# Patient Record
Sex: FEMALE | Race: OTHER | HISPANIC OR LATINO | Employment: PART TIME | URBAN - METROPOLITAN AREA
[De-identification: names, ages, dates, MRNs, and addresses within clinical notes are randomized per-mention and may not be internally consistent; named-entity substitution may affect disease eponyms.]

---

## 2017-02-28 ENCOUNTER — ALLSCRIPTS OFFICE VISIT (OUTPATIENT)
Dept: OTHER | Facility: OTHER | Age: 27
End: 2017-02-28

## 2017-04-07 ENCOUNTER — TRANSCRIBE ORDERS (OUTPATIENT)
Dept: ADMINISTRATIVE | Facility: HOSPITAL | Age: 27
End: 2017-04-07

## 2017-04-08 ENCOUNTER — HOSPITAL ENCOUNTER (OUTPATIENT)
Dept: RADIOLOGY | Facility: HOSPITAL | Age: 27
Discharge: HOME | End: 2017-04-08
Payer: COMMERCIAL

## 2017-04-08 ENCOUNTER — TRANSCRIBE ORDERS (OUTPATIENT)
Dept: RADIOLOGY | Facility: HOSPITAL | Age: 27
End: 2017-04-08

## 2017-04-08 DIAGNOSIS — J06.9 ACUTE UPPER RESPIRATORY INFECTION: Primary | ICD-10-CM

## 2017-04-08 DIAGNOSIS — J06.9 ACUTE UPPER RESPIRATORY INFECTION: ICD-10-CM

## 2017-04-08 PROCEDURE — 71020 HB CHEST X-RAY 2VW FRONTAL&LATL: CPT

## 2017-04-24 ENCOUNTER — TRANSCRIBE ORDERS (OUTPATIENT)
Dept: ADMINISTRATIVE | Facility: HOSPITAL | Age: 27
End: 2017-04-24

## 2017-04-24 DIAGNOSIS — E66.1 MORBID DRUG-INDUCED OBESITY: Primary | ICD-10-CM

## 2017-04-24 DIAGNOSIS — R94.5 NONSPECIFIC ABNORMAL RESULTS OF LIVER FUNCTION STUDY: ICD-10-CM

## 2017-05-05 ENCOUNTER — HOSPITAL ENCOUNTER (OUTPATIENT)
Dept: RADIOLOGY | Facility: HOSPITAL | Age: 27
Discharge: HOME | End: 2017-05-05
Payer: COMMERCIAL

## 2017-05-05 DIAGNOSIS — R94.5 NONSPECIFIC ABNORMAL RESULTS OF LIVER FUNCTION STUDY: ICD-10-CM

## 2017-05-05 DIAGNOSIS — E66.1 MORBID DRUG-INDUCED OBESITY: ICD-10-CM

## 2017-05-05 PROCEDURE — 76705 ECHO EXAM OF ABDOMEN: CPT

## 2017-05-31 ENCOUNTER — TRANSCRIBE ORDERS (OUTPATIENT)
Dept: ADMINISTRATIVE | Facility: HOSPITAL | Age: 27
End: 2017-05-31

## 2017-05-31 DIAGNOSIS — N64.4 MASTODYNIA OF RIGHT BREAST: Primary | ICD-10-CM

## 2017-06-12 ENCOUNTER — HOSPITAL ENCOUNTER (OUTPATIENT)
Dept: ULTRASOUND IMAGING | Facility: CLINIC | Age: 27
Discharge: HOME | End: 2017-06-12
Payer: COMMERCIAL

## 2017-06-12 DIAGNOSIS — N64.4 MASTODYNIA OF RIGHT BREAST: ICD-10-CM

## 2017-06-12 PROCEDURE — 76642 ULTRASOUND BREAST LIMITED: CPT

## 2017-07-10 ENCOUNTER — TRANSCRIBE ORDERS (OUTPATIENT)
Dept: ADMINISTRATIVE | Facility: HOSPITAL | Age: 27
End: 2017-07-10

## 2017-07-10 DIAGNOSIS — R10.2 FEMALE PELVIC PAIN: Primary | ICD-10-CM

## 2017-07-14 ENCOUNTER — HOSPITAL ENCOUNTER (OUTPATIENT)
Dept: RADIOLOGY | Facility: HOSPITAL | Age: 27
Discharge: HOME | End: 2017-07-14
Payer: COMMERCIAL

## 2017-07-14 DIAGNOSIS — R10.2 FEMALE PELVIC PAIN: ICD-10-CM

## 2017-07-14 PROCEDURE — 76830 TRANSVAGINAL US NON-OB: CPT

## 2017-07-14 PROCEDURE — 76856 US EXAM PELVIC COMPLETE: CPT

## 2017-07-27 ENCOUNTER — TRANSCRIBE ORDERS (OUTPATIENT)
Dept: ADMINISTRATIVE | Facility: HOSPITAL | Age: 27
End: 2017-07-27

## 2017-07-27 DIAGNOSIS — N94.10 PAIN IN FEMALE GENITALIA ON INTERCOURSE: Primary | ICD-10-CM

## 2017-07-28 ENCOUNTER — HOSPITAL ENCOUNTER (OUTPATIENT)
Dept: RADIOLOGY | Facility: HOSPITAL | Age: 27
Discharge: HOME | End: 2017-07-28
Payer: COMMERCIAL

## 2017-07-28 DIAGNOSIS — N94.10 PAIN IN FEMALE GENITALIA ON INTERCOURSE: ICD-10-CM

## 2017-09-06 ENCOUNTER — GENERIC CONVERSION - ENCOUNTER (OUTPATIENT)
Dept: OTHER | Facility: OTHER | Age: 27
End: 2017-09-06

## 2018-01-13 VITALS
BODY MASS INDEX: 35.11 KG/M2 | HEART RATE: 72 BPM | HEIGHT: 59 IN | SYSTOLIC BLOOD PRESSURE: 128 MMHG | WEIGHT: 174.16 LBS | TEMPERATURE: 98.4 F | DIASTOLIC BLOOD PRESSURE: 72 MMHG

## 2018-01-15 NOTE — PROGRESS NOTES
Assessment    1  Encounter for routine gynecological examination ( 30) (Z01 419)   2  Family history of diabetes mellitus (V18 0) (Z83 3) : Maternal Grandfather, Paternal   Grandfather, Father, Sister   3  Family history of Blood clot in vein : Sister    Plan  Encounter for routine gynecological examination    · Follow-up visit in 1 year Evaluation and Treatment  Follow-up  Status: Hold For -  Scheduling  Requested for: [id number]   Ordered; For: Encounter for routine gynecological examination; Ordered By: Ledy Rivera Performed:  Due: 42Ekl6982    Discussion/Summary  Patient discussion: discussed with the patient, 15 minute visit, greater than half of the time was spent on counseling  30 yo  well woman visit:  1  Normal exam today  2  Pap due in 2 yr  3  If scalp and/or eyebrow rash and itching recur, see PCP  4  RTO yearly and as needed  Chief Complaint  Pt here for Annual exam, gyn/ depression screening not completed prior to rooming p however, denies depression or thoughts of hurting selft  Pt is a non-smoker,c/o Occ  pain in both breasts  History of Present Illness  HPI: 30 yo  here for routine well woman visit  Last well woman visit 2015; at that time reported pelvic pain/dyspareunia, 5 yr  painful breasts, galactorrhea  All testing was normal (Prolactin, Pap, GC/CT, pelvic ultrasound, BL breast ultrasound)  Menses monthly, not excessive pain or flow  S/P BTL  Same partner for 11 yr  Uncertain history of psoriasis on scalp and eyebrows; diagnosed in [place] and given hydrocortisone; no symptoms now  GYN HM, Adult Female [facility]: The patient is being seen for a gynecology evaluation  Social History: Household members include spouse and 3 children  She is   Work status: not currently employed  The patient has never smoked cigarettes  She reports never drinking alcohol  She has never used illicit drugs  General Health:  The patient's health since the last visit is described as good  She has regular dental visits  She complains of vision problems  Vision care includes wearing glasses  She denies hearing loss  Lifestyle:  She consumes a diverse and healthy diet  She does not have any weight concerns  She does not exercise regularly  She does not use tobacco  She denies alcohol use  She denies drug use  Reproductive health:  she reports no menstrual problems  she uses contraception  For contraception, she has had a tubal occlusion  she is sexually active  pregnancy history:  Screening:      OB History  Pregnancy History (Brief):   Prior pregnancies: : 3  Para: 3 (full-term) and 3 (living)   Delivery type: 3 vaginal       Active Problems    1  Acute non-recurrent maxillary sinusitis (461 0) (J01 00)   2  Acute upper respiratory infection (465 9) (J06 9)   3  Allergic conjunctivitis (372 14) (H10 10)   4  Allergic rhinitis (477 9) (J30 9)   5  Allergic urticaria (708 0) (L50 0)   6  Chronic nasal congestion (478 19) (R09 81)   7  Chronic tension-type headache, not intractable (339 12) (G44 229)   8  Depression (311) (F32 9)   9  Encounter for routine gynecological examination (V72 31) (Z01 419)   10  Left-sided low back pain with left-sided sciatica (724 3) (M54 42)   11  Neck pain, chronic (723 1,338 29) (M54 2,G89 29)   12  Need for influenza vaccination (V04 81) (Z23)   13  Need for Tdap vaccination (V06 1) (Z23)   14  Obesity (278 00) (E66 9)   15  Poison ivy (692 6) (L23 7)   16   Screening for hyperlipidemia (V77 91) (Z13 220)    Past Medical History    · Acute conjunctivitis (372 00) (H10 30)   · History of Encounter for routine gynecological examination (V72 31) (Z01 419)   · History of abnormal cervical Papanicolaou smear (V13 29) (K81 600)   · History of galactorrhea (V12 29) (Z87 59)   · History of gastroenteritis (V12 79) (Z87 19)   · History of sinusitis (V12 69) (Z87 09)   · History of Need for influenza vaccination (V04 81) (Z23)   · History of Pelvic pain in female (625 9) (R10 2)    Surgical History    · History of Dental Surgery   · History of Tubal Ligation   · History of Umbilical Hernia Repair    The surgical history was reviewed and updated today  Family History  Mother    · Family history of systemic lupus erythematosus (V19 4) (Z82 69)  Father    · Family history of    · Family history of diabetes mellitus (V18 0) (Z83 3)  Sister    · Family history of diabetes mellitus (V18 0) (Z83 3)  Maternal Grandfather    · Family history of diabetes mellitus (V18 0) (Z83 3)  Paternal Grandfather    · Family history of diabetes mellitus (V18 0) (Z83 3)    Social History    · Denied: History of Active smoker   · Lives with domestic partner   · Never a smoker   · No drug use   · Part-time employment   · Single   · Social alcohol use (Z78 9)   · Three children    Current Meds   1  Amitriptyline HCl - 10 MG Oral Tablet; TAKE 1 TABLET AT BEDTIME; Therapy: 28AJG2582 to (Evaluate:61Ngr1670)  Requested for: 35EES9158; Last   Rx:2016; Status: ACTIVE - Renewal Denied Ordered   2  Fluticasone Propionate 50 MCG/ACT Nasal Suspension; USE 1 SPRAY IN EACH   NOSTRIL TWICE DAILY; Therapy: 38OSK8898 to (Gwendloyn Alken)  Requested for: 2016; Last   Rx:2016 Ordered   3  Ibuprofen 600 MG Oral Tablet; TAKE ONE TABLET BY MOUTH EVERY 8 HOURS AS   NEEDED FOR PAIN;   Therapy: 51Orq7608 to (Evaluate:83Hcz9468)  Requested for: 96Jlp6115; Last   Rx:06Nud8586 Ordered   4  Loratadine 10 MG Oral Tablet; TAKE 1 TABLET BY MOUTH EVERY DAY; Therapy: 58RXK0223 to (Gwendloyn Alken)  Requested for: 2016; Last   Rx:2016 Ordered   5  Mucinex D 120-1200 MG Oral Tablet Extended Release 12 Hour; TAKE 1 TABLET   EVERY 12 HOURS AS NEEDED FOR CONGESTION; Therapy: 51Ejk2464 to (Evaluate:30Gdm2592)  Requested for: 97Qvc2398; Last   Rx:73Zib6170 Ordered    Allergies    1  Cipro SOLN   2  Septra TABS   3   Zosyn    Vitals   Recorded: [id number] 10: 64SZ   Systolic 578   Diastolic 60   Pain Scale 0   LMP 66FSY4066   Height 4 ft 11 in   Weight 164 lb    BMI Calculated 33 12   BSA Calculated 1 69     Physical Exam    Constitutional   General appearance: No acute distress, well appearing and well nourished  Neck   Thyroid: Normal, no thyromegaly  Pulmonary   Auscultation of lungs: Clear to auscultation  Cardiovascular   Auscultation of heart: Normal rate and rhythm, normal S1 and S2, no murmurs  Genitourinary   External genitalia: Normal and no lesions appreciated  Vagina: Normal, no lesions or dryness appreciated  Cervix: Normal, no palpable masses  Uterus: Normal, non-tender, not enlarged, and no palpable masses  Adnexa/parametria: Normal, non-tender and no fullness or masses appreciated  Chest   Breasts: Normal and no dimpling or skin changes noted  Abdomen   Abdomen: Normal, non-tender, and no organomegaly noted  Results/Data  Screen Ebola Flowsheet 73GQH7536 10:53AM      Test Name Result Flag Reference   Exposure to Ebola Virus - Within 21 Days No         Signatures   Electronically signed by : Juvenal Geronimo;  Aug 16 2016 11:28AM EST                       (Author)    Electronically signed by : Senait Escoto MD; Sep  8 2016  9:44AM EST

## 2018-01-15 NOTE — MISCELLANEOUS
Provider Comments  Provider Comments:   pt noshow for today appt  can resched on own      Signatures   Electronically signed by : [facility]; Jan 21 2016  4:23PM EST                       (Author)    Electronically signed by : [facility]; Jan 21 2016  4:27PM EST                       (Author)

## 2018-01-22 VITALS
SYSTOLIC BLOOD PRESSURE: 129 MMHG | BODY MASS INDEX: 31.25 KG/M2 | HEIGHT: 59 IN | WEIGHT: 155 LBS | DIASTOLIC BLOOD PRESSURE: 84 MMHG

## 2018-03-28 ENCOUNTER — TRANSCRIBE ORDERS (OUTPATIENT)
Dept: ADMINISTRATIVE | Facility: HOSPITAL | Age: 28
End: 2018-03-28

## 2018-03-28 DIAGNOSIS — G47.30 SLEEP APNEA, UNSPECIFIED TYPE: ICD-10-CM

## 2018-05-04 ENCOUNTER — HOSPITAL ENCOUNTER (OUTPATIENT)
Dept: SLEEP CENTER | Facility: CLINIC | Age: 28
Discharge: HOME | End: 2018-05-04
Payer: COMMERCIAL

## 2018-05-04 DIAGNOSIS — G47.30 SLEEP APNEA, UNSPECIFIED TYPE: ICD-10-CM

## 2018-05-04 PROCEDURE — 95810 POLYSOM 6/> YRS 4/> PARAM: CPT

## 2018-05-05 NOTE — PROGRESS NOTES
Sleep Study Documentation    Pre-Sleep Study       Sleep testing procedure explained to patient:YES    Patient napped prior to study:NO    Caffeine:Dayshift worker after 12PM   Caffeine use:YES- soda  12 ounces    Alcohol:Dayshift workers after 5PM: Alcohol use:NO    Typical day for patient:YES       Study Documentation  Diagnostic   Snore: Moderate to loud  Supplemental O2: no    Minimum SaO2 91%  Baseline SaO2 97%        EKG abnormalities: yes:  EPOCH example and comments:  483, 509 &  573    EEG abnormalities: no    Study Terminated:no    Patient classification: employed       Post-Sleep Study    Medication used at bedtime or during sleep study:NO    Patient reports time it took to fall asleep:greater than 60 minutes    Patient reports waking up during study:3 or more times  Patient reports returning to sleep in 10 to 30 minutes  Patient reports sleeping 2 to 4 hours without dreaming  Patient reports sleep during study:worse than usual    Patient rated sleepiness: Somewhat sleepy or tired    PAP treatment:no

## 2018-05-08 ENCOUNTER — TELEPHONE (OUTPATIENT)
Dept: SLEEP CENTER | Facility: CLINIC | Age: 28
End: 2018-05-08

## 2018-05-22 NOTE — TELEPHONE ENCOUNTER
Letter sent to patient to follow up with PCP or call to schedule consult with Dr Carly Delgadillo if desired

## 2018-10-31 ENCOUNTER — TELEPHONE (OUTPATIENT)
Dept: GASTROENTEROLOGY | Facility: MEDICAL CENTER | Age: 28
End: 2018-10-31

## 2018-10-31 ENCOUNTER — TRANSCRIBE ORDERS (OUTPATIENT)
Dept: ADMINISTRATIVE | Facility: HOSPITAL | Age: 28
End: 2018-10-31

## 2018-10-31 DIAGNOSIS — R19.7 DIARRHEA, UNSPECIFIED TYPE: ICD-10-CM

## 2018-10-31 DIAGNOSIS — R10.9 STOMACH ACHE: Primary | ICD-10-CM

## 2018-10-31 NOTE — TELEPHONE ENCOUNTER
New pt called to sched appt due to ABD PAIN/DIARRHEA  Pt would like to sched in the [place] office and before the end of the year   Pt can be reached at [phone]

## 2018-11-05 ENCOUNTER — HOSPITAL ENCOUNTER (OUTPATIENT)
Dept: RADIOLOGY | Facility: HOSPITAL | Age: 28
Discharge: HOME | End: 2018-11-05
Payer: COMMERCIAL

## 2018-11-05 DIAGNOSIS — R19.7 DIARRHEA, UNSPECIFIED TYPE: ICD-10-CM

## 2018-11-05 DIAGNOSIS — R10.9 STOMACH ACHE: ICD-10-CM

## 2018-11-05 PROCEDURE — 76705 ECHO EXAM OF ABDOMEN: CPT

## 2018-11-15 RX ORDER — GLYCERIN ADULT
SUPPOSITORY, RECTAL RECTAL
COMMUNITY
Start: 2018-10-30 | End: 2019-02-16 | Stop reason: HOSPADM

## 2018-11-15 RX ORDER — OMEPRAZOLE 20 MG/1
1 CAPSULE, DELAYED RELEASE ORAL DAILY
COMMUNITY
Start: 2017-02-28

## 2018-11-15 RX ORDER — IBUPROFEN 600 MG/1
1 TABLET ORAL EVERY 8 HOURS PRN
Status: ON HOLD | COMMUNITY
Start: 2016-04-04 | End: 2019-02-10 | Stop reason: CLARIF

## 2018-11-15 RX ORDER — PHENTERMINE HYDROCHLORIDE 37.5 MG/1
TABLET ORAL
Status: ON HOLD | COMMUNITY
End: 2019-02-10 | Stop reason: CLARIF

## 2018-11-20 ENCOUNTER — OFFICE VISIT (OUTPATIENT)
Dept: GASTROENTEROLOGY | Facility: CLINIC | Age: 28
End: 2018-11-20
Payer: COMMERCIAL

## 2018-11-20 VITALS
HEIGHT: 59 IN | BODY MASS INDEX: 34.31 KG/M2 | TEMPERATURE: 97.9 F | SYSTOLIC BLOOD PRESSURE: 122 MMHG | WEIGHT: 170.2 LBS | DIASTOLIC BLOOD PRESSURE: 82 MMHG | HEART RATE: 72 BPM

## 2018-11-20 DIAGNOSIS — R19.7 DIARRHEA, UNSPECIFIED TYPE: ICD-10-CM

## 2018-11-20 DIAGNOSIS — K58.0 IRRITABLE BOWEL SYNDROME WITH DIARRHEA: ICD-10-CM

## 2018-11-20 DIAGNOSIS — R10.84 DIFFUSE ABDOMINAL PAIN: Primary | ICD-10-CM

## 2018-11-20 PROCEDURE — 99244 OFF/OP CNSLTJ NEW/EST MOD 40: CPT | Performed by: INTERNAL MEDICINE

## 2018-11-20 RX ORDER — DICYCLOMINE HCL 20 MG
20 TABLET ORAL EVERY 6 HOURS
Qty: 30 TABLET | Refills: 2 | Status: ON HOLD | OUTPATIENT
Start: 2018-11-20 | End: 2018-12-20

## 2018-11-20 NOTE — PROGRESS NOTES
[facility] - Outpatient Consultation  Karel Staley 33 y o  female MRN: [id number]  Encounter: [id number]          ASSESSMENT AND PLAN:    Karel Staley is a 33 y o  female who presents today, per referral by her Ramírez Soliman, in consultation regarding evaluation and management of abdominal pain and diarrhea  1  Diffuse abdominal pain  For over one month, she has been experiencing abdominal pain that is exacerbated by eating  She has a prescription for omeprazole which she reportedly will take when she feels she is going to have a stomach ache  I will schedule her for EGD to evaluate for ulcers,H pylori infection, gastritis, or esophagitis  Discussed risks, benefits, and alternatives to the procedure  Risks include but are not limited to infection, bleeding, perforation, or missed lesions  I provided her with Bentyl 20 mg to be taken as needed for abdominal pain  2  Irritable bowel syndrome with diarrhea  For the last month, she has been experiencing diarrhea which is atypical for her  This is likely due to irritable bowel syndrome  I recommend that she start taking a probiotic, Align, daily  I also recommended that she eat a high fiber diet and take fiber supplementation with Metamucil  I will schedule her for colonoscopy to evaluate for colitis, H pylori infection, or any other GI related issues  Discussed risks, benefits, and alternatives to the procedure  Risks include but are not limited to infection, bleeding, perforation, or missed lesions  I also ordered stool studies to be completed including ova and parasite, stool enteric bacterial panel, and C diff      ______________________________________________________________________    HPI:  Karel Staley is a 33 y o  female who presents today, per referral by her PCP Dr Ramírez Soliman, in consultation regarding evaluation and management of abdominal pain and change in bowel habits with diarrhea   Right sided abdominal pain has been going one for over one month and one month ago she would experience postprandial diarrhea but even when she does not eat she will still experience diarrhea  Anything that she will eat exacerbates the symptoms  She has lost approximately 10 pounds in the last 3 weeks  She was given weight loss medication but she is not taking this  Prior to this, she never experienced constipation but will have a regular bowel movement after each time she would eat  She has not had any family members who have been sick nor has she taken antibiotics  She has not recently traveled outside of the state  She denies hematochezia  Her mother and father have a history of "colitis"  She has blood work drawn within the last two weeks, results of which are not available to me  She is taking omeprazole which she will take when she feels a stomach ache  She will sometimes take Aleve once or twice a week  US abdomen was completed on 11/5/18 which showed fatty infiltration of the liver with regions of fatty sparing adjacent to the gallbladder fossa as well as elongated relatively hypoechoic lesion corticomedullary junction upper pole right kidney which was stable  REVIEW OF SYSTEMS:    CONSTITUTIONAL: Denies any fever, chills, rigors  10 pound weight loss in 3 weeks  HEENT: No earache or tinnitus  Denies hearing loss or visual disturbances  CARDIOVASCULAR: No chest pain or palpitations  RESPIRATORY: Denies any cough, hemoptysis, shortness of breath or dyspnea on exertion  GASTROINTESTINAL: As noted in the History of Present Illness  GENITOURINARY: No problems with urination  Denies any hematuria or dysuria  NEUROLOGIC: No dizziness or vertigo, denies headaches  MUSCULOSKELETAL: Denies any muscle or joint pain  SKIN: Denies skin rashes or itching  ENDOCRINE: Denies excessive thirst  Denies intolerance to heat or cold  PSYCHOSOCIAL: Denies depression or anxiety  Denies any recent memory loss  Historical Information   History reviewed  No pertinent past medical history  Past Surgical History:   Procedure Laterality Date    TUBAL LIGATION       Social History   History   Alcohol Use No     History   Drug Use No     History   Smoking Status    Never Smoker   Smokeless Tobacco    Never Used     History reviewed  No pertinent family history  Meds/Allergies       Current Outpatient Prescriptions:     [employer] PAIN RELIEF EXTRA STRENGTH 500 MG tablet    ibuprofen (MOTRIN) 600 mg tablet    omeprazole (PriLOSEC) 20 mg delayed release capsule    phentermine (ADIPEX-P) 37 5 MG tablet    Allergies   Allergen Reactions    Septra [Sulfamethoxazole-Trimethoprim] Rash and Shortness Of Breath    Ciprofloxacin Hcl     Zosyn [Piperacillin Sod-Tazobactam So] Hives and Rash           Objective     Blood pressure 122/82, pulse 72, temperature 97 9 °F (36 6 °C), temperature source Tympanic, height 4' 11" (1 499 m), weight 77 2 kg (170 lb 3 2 oz)  Body mass index is 34 38 kg/m²  PHYSICAL EXAM:      General Appearance:   Alert, cooperative, no distress  Obese  HEENT:   Normocephalic, atraumatic, anicteric      Neck:  Supple, symmetrical, trachea midline   Lungs:   Clear to auscultation bilaterally; no rales, rhonchi or wheezing; respirations unlabored    Heart[de-identified]   Regular rate and rhythm; no murmur, rub, or gallop  Abdomen:   Soft, non-distended; normal bowel sounds; no masses, no organomegaly  Mild diffuse tenderness   Genitalia:   Deferred    Rectal:   Deferred    Extremities:  No cyanosis, clubbing or edema    Pulses:  2+ and symmetric    Skin:  No jaundice, rashes, or lesions    Lymph nodes:  No palpable cervical lymphadenopathy        Lab Results:   No visits with results within 1 Day(s) from this visit     Latest known visit with results is:   Admission on 01/21/2016, Discharged on 01/21/2016   Component Date Value    Preg Test, Ur (Ref: Nega* 01/21/2016 Negative     Color, UA 01/21/2016 Yellow  Clarity, UA 01/21/2016 Clear     pH, UA 01/21/2016 6 0     Leukocytes, UA 01/21/2016 Negative     Nitrite, UA 01/21/2016 Negative     Protein, UA 01/21/2016 Negative     Glucose, UA 01/21/2016 Negative     Ketones, UA 01/21/2016 Negative     Urobilinogen, UA 01/21/2016 0 2     Bilirubin, UA 01/21/2016 Negative     Blood, UA 01/21/2016 Small*    Specific Gravity, UA 01/21/2016 <=1 005     RBC, UA 01/21/2016 None Seen     WBC, UA 01/21/2016 None Seen     Epithelial Cells 01/21/2016 None Seen     Bacteria, UA 01/21/2016 None Seen     Urine Culture 01/21/2016 No Growth <1000 cfu/mL     Ventricular Rate 01/21/2016 140     Atrial Rate 01/21/2016 140     NC Interval 01/21/2016 184     QRSD Interval 01/21/2016 90     QT Interval 01/21/2016 270     QTC Interval 01/21/2016 412     P Axis 01/21/2016 59     QRS Axis 01/21/2016 60     T Wave Axis 01/21/2016 -4     Total Bilirubin 01/21/2016 0 52     Calcium 01/21/2016 9 1     CO2 01/21/2016 21     Chloride 01/21/2016 104     Creatinine 01/21/2016 0 91     Glucose 01/21/2016 127     Alkaline Phosphatase 01/21/2016 67     Potassium 01/21/2016 4 3     Total Protein 01/21/2016 8 0     Sodium 01/21/2016 136     AST 01/21/2016 35     ALT 01/21/2016 32     BUN 01/21/2016 7     ANION GAP 01/21/2016 11     Albumin 01/21/2016 3 6     eGFR 01/21/2016 >60 0     WBC 01/21/2016 13 47*    RBC 01/21/2016 4 39     Hemoglobin 01/21/2016 14 3     Hematocrit 01/21/2016 41 8     MCV 01/21/2016 95 2     MCH 01/21/2016 32 6     MCHC 01/21/2016 34 2     RDW 01/21/2016 12 1     MPV 01/21/2016 10 7     Platelets 86/55/3112 190     nRBC 01/21/2016 0     Neutrophils Relative 01/21/2016 87*    Lymphocytes Relative 01/21/2016 7*    Monocytes Relative 01/21/2016 6     Eosinophils Relative 01/21/2016 0     Basophils Relative 01/21/2016 0     Neutrophils Absolute 01/21/2016 11 60*    Lymphocytes Absolute 01/21/2016 0 96     Monocytes Absolute 01/21/2016 0 86     Eosinophils Absolute 01/21/2016 0 00     Basophils Absolute 01/21/2016 0 01          Radiology Results:   US Abdomen Limited on 11/5/2018   Impression: Fatty infiltration of liver with regions of fatty sparing adjacent to gallbladder fossa  Unremarkable gallbladder and biliary ducts  Elongated relatively hypoechoic lesion corticomedullary junction upper pole right kidney grossly stable from May 2017  Given the stability, this is more than likely benign however given the unusual appearance, and patient's symptoms: Follow-up contrast-enhanced CT of the abdomen and pelvis (renal mass protocol) would be prudent to evaluate the patient's symptoms and this ultrasound finding  Workstation performed: [id number]           Attestation:   By signing my name below, Maxim Melgoza, attest that this documentation has been prepared under the direction and in the presence of Orville Whitehead MD  Electronically Signed: Carlito Pagan  11/20/18     I, Orville Whitehead, personally performed the services described in this documentation  All medical record entries made by the scribe were at my direction and in my presence  I have reviewed the chart and discharge instructions and agree that the record reflects my personal performance and is accurate and complete   Orville Whitehead MD  11/20/18

## 2018-11-20 NOTE — PATIENT INSTRUCTIONS
1  Take Align 1 capsule per day  2  Eat high fiber diet, Metamucil is good to take as fiber supplementation  3  Take bentyl as needed for abdominal pain  4  We will schedule you for EGD and colonoscopy  Colon/EGD scheduled 12/20/18 with Guillermina at [facility]  Suprep instructions and coupon given in office   Release of [facility] sent to PCP Dr Ivan Olivares at [phone]

## 2018-12-09 ENCOUNTER — ANESTHESIA EVENT (OUTPATIENT)
Dept: PERIOP | Facility: AMBULARY SURGERY CENTER | Age: 28
End: 2018-12-09
Payer: COMMERCIAL

## 2018-12-10 DIAGNOSIS — R19.7 DIARRHEA, UNSPECIFIED TYPE: Primary | ICD-10-CM

## 2018-12-14 ENCOUNTER — TELEPHONE (OUTPATIENT)
Dept: GASTROENTEROLOGY | Facility: CLINIC | Age: 28
End: 2018-12-14

## 2018-12-14 NOTE — TELEPHONE ENCOUNTER
geme patient      [facility] to notify you that the su[facility] is not covered by insurance--please order alternate prep for appt on 12-20-18

## 2018-12-14 NOTE — TELEPHONE ENCOUNTER
I called and spoke to pharmacist at [facility] he will fill golytely for pt and I faxed prep instructions to [phone]

## 2018-12-20 ENCOUNTER — HOSPITAL ENCOUNTER (OUTPATIENT)
Facility: AMBULARY SURGERY CENTER | Age: 28
Setting detail: OUTPATIENT SURGERY
Discharge: HOME | End: 2018-12-20
Attending: INTERNAL MEDICINE | Admitting: INTERNAL MEDICINE
Payer: COMMERCIAL

## 2018-12-20 ENCOUNTER — ANESTHESIA (OUTPATIENT)
Dept: PERIOP | Facility: AMBULARY SURGERY CENTER | Age: 28
End: 2018-12-20
Payer: COMMERCIAL

## 2018-12-20 VITALS
HEIGHT: 59 IN | HEART RATE: 78 BPM | OXYGEN SATURATION: 98 % | BODY MASS INDEX: 33.47 KG/M2 | DIASTOLIC BLOOD PRESSURE: 74 MMHG | WEIGHT: 166 LBS | SYSTOLIC BLOOD PRESSURE: 122 MMHG | TEMPERATURE: 97.2 F | RESPIRATION RATE: 18 BRPM

## 2018-12-20 DIAGNOSIS — K58.0 IRRITABLE BOWEL SYNDROME WITH DIARRHEA: ICD-10-CM

## 2018-12-20 DIAGNOSIS — R10.84 DIFFUSE ABDOMINAL PAIN: ICD-10-CM

## 2018-12-20 DIAGNOSIS — R19.7 DIARRHEA, UNSPECIFIED TYPE: ICD-10-CM

## 2018-12-20 PROCEDURE — 45380 COLONOSCOPY AND BIOPSY: CPT | Performed by: INTERNAL MEDICINE

## 2018-12-20 PROCEDURE — 43239 EGD BIOPSY SINGLE/MULTIPLE: CPT | Performed by: INTERNAL MEDICINE

## 2018-12-20 PROCEDURE — 88305 TISSUE EXAM BY PATHOLOGIST: CPT | Performed by: PATHOLOGY

## 2018-12-20 RX ORDER — PROPOFOL 10 MG/ML
INJECTION, EMULSION INTRAVENOUS AS NEEDED
Status: DISCONTINUED | OUTPATIENT
Start: 2018-12-20 | End: 2018-12-20 | Stop reason: SURG

## 2018-12-20 RX ORDER — SODIUM CHLORIDE 9 MG/ML
125 INJECTION, SOLUTION INTRAVENOUS CONTINUOUS
Status: DISCONTINUED | OUTPATIENT
Start: 2018-12-20 | End: 2018-12-20 | Stop reason: HOSPADM

## 2018-12-20 RX ADMIN — PROPOFOL 50 MG: 10 INJECTION, EMULSION INTRAVENOUS at 09:10

## 2018-12-20 RX ADMIN — SODIUM CHLORIDE: 0.9 INJECTION, SOLUTION INTRAVENOUS at 08:57

## 2018-12-20 RX ADMIN — PROPOFOL 100 MG: 10 INJECTION, EMULSION INTRAVENOUS at 09:02

## 2018-12-20 RX ADMIN — PROPOFOL 50 MG: 10 INJECTION, EMULSION INTRAVENOUS at 09:13

## 2018-12-20 RX ADMIN — PROPOFOL 100 MG: 10 INJECTION, EMULSION INTRAVENOUS at 09:00

## 2018-12-20 RX ADMIN — PROPOFOL 100 MG: 10 INJECTION, EMULSION INTRAVENOUS at 09:04

## 2018-12-20 NOTE — DISCHARGE INSTRUCTIONS
OPERATIVE REPORT  PATIENT NAME: Lindsey Ruth    :  1990  MRN: [id number]  Pt Location: [facility]  GI ROOM 01    SURGERY DATE: 2018    Surgeon(s) and Role:     * Sveta Finnegan MD - Primary    Preop Diagnosis:  Diarrhea, unspecified type [R19 7]  Diffuse abdominal pain [R10 84]  Irritable bowel syndrome with diarrhea [K58 0]    Post-Op Diagnosis Codes:     * Diarrhea, unspecified type [R19 7]     * Diffuse abdominal pain [R10 84]     * Irritable bowel syndrome with diarrhea [K58 0]    Procedure(s) (LRB):  COLONOSCOPY (N/A)  ESOPHAGOGASTRODUODENOSCOPY (EGD) (N/A)    Specimen(s):  ID Type Source Tests Collected by Time Destination   1 : Cold Bx Duodenum Tissue Duodenum TISSUE EXAM Sveta Finnegan MD 2018 0905    2 : Cold Bx Gastric Body Tissue Stomach TISSUE EXAM Sveta Finnegan MD 2018 1516        Estimated Blood Loss:   Minimal    ESOPHAGOGASTRODUODENOSCOPY    PROCEDURE: EGD    SEDATION: Monitored anesthesia care, check anesthesia records    ASA Class: 2    INDICATIONS:  Abdominal pain and change in bowel habit    CONSENT:  Informed consent was obtained for the procedure, including sedation after explaining the risks and benefits of the procedure  Risks including but not limited to bleeding, perforation, infection, and missed lesion  PREPARATION:   Telemetry, pulse oximetry, blood pressure were monitored throughout the procedure  Patient was identified by myself both verbally and by visual inspection of ID band  DESCRIPTION:   Patient was placed in the left lateral decubitus position and was sedated with the above medication  The gastroscope was introduced in to the oropharynx and the esophagus was intubated under direct visualization  Scope was passed down the esophagus up to 2nd part of the duodenum  A careful inspection was made as the gastroscope was withdrawn, including a retroflexed view of the stomach; findings and interventions are described below       FINDINGS:    #1  Esophagus- normal esophagus  GE junction at 35 cm  #2  Stomach- nonerosive gastritis in the antrum  Two cold biopsies were taken from the antrum to rule out H pylori infection  #3  Duodenum- normal bulb and 2nd portion of the duodenum  Four cold biopsies were taken to assess for celiac disease  IMPRESSIONS:      Mild nonerosive gastritis otherwise normal EGD  Biopsies were taken to rule out H pylori infection and celiac disease  RECOMMENDATIONS:     Follow-up biopsy  Colonoscopy to follow    COMPLICATIONS:  None; patient tolerated the procedure well            DISPOSITION: PACU           CONDITION: Stable    SIGNATURE: Beronica Lombardi MD  DATE: 2018  TIME: 9:06 AM      OPERATIVE REPORT  PATIENT NAME: Shay Mujica    :  1990  MRN: [id number]  Pt Location: [facility]  GI ROOM 01    SURGERY DATE: 2018    Surgeon(s) and Role:     * Beronica Lombardi MD - Primary    Preop Diagnosis:  Diarrhea, unspecified type [R19 7]  Diffuse abdominal pain [R10 84]  Irritable bowel syndrome with diarrhea [K58 0]    Post-Op Diagnosis Codes:     * Diarrhea, unspecified type [R19 7]     * Diffuse abdominal pain [R10 84]     * Irritable bowel syndrome with diarrhea [K58 0]    Procedure(s) (LRB):  COLONOSCOPY (N/A)  ESOPHAGOGASTRODUODENOSCOPY (EGD) (N/A)    Specimen(s):  ID Type Source Tests Collected by Time Destination   1 : Cold Bx Duodenum Tissue Duodenum TISSUE EXAM Beronica Lombardi MD 2018 09    2 : Cold Bx Gastric Body Tissue Stomach TISSUE EXAM Beronica Lombardi MD 2018    3 : Random Coldm Bx Colon Tissue Colon TISSUE EXAM Beronica Lombardi MD 2018 0912        Estimated Blood Loss:   Minimal    Colonoscopy Procedure Note    Procedure: Colonoscopy    Sedation: Monitored anesthesia care, check anesthesia records      ASA Class: 2    INDICATIONS:  Abdominal pain and diarrhea    POST-OP DIAGNOSIS: See the impression below    Procedure Details     Prior colonoscopy: No prior colonoscopy  Informed consent was obtained for the procedure, including sedation  Risks of perforation, hemorrhage, adverse drug reaction and aspiration were discussed  The patient was placed in the left lateral decubitus position  Based on the pre-procedure assessment, including review of the patient's medical history, medications, allergies, and review of systems, she had been deemed to be an appropriate candidate for conscious sedation; she was therefore sedated with the medications listed below  The patient was monitored continuously with telemetry, pulse oximetry, blood pressure monitoring, and direct observations  A rectal examination was performed  The colonoscope was inserted into the rectum and advanced under direct vision to the cecum, which was identified by the ileocecal valve and appendiceal orifice  The quality of the colonic preparation was good  A careful inspection was made as the colonoscope was withdrawn, including a retroflexed view of the rectum; findings and interventions are described below  Findings:  Normal colon and terminal ileum  Random biopsies were taken to rule out celiac disease  Complications: None; patient tolerated the procedure well  Impression:    Normal colon and terminal ileum  Random biopsies were taken to rule out celiac disease  Recommendations:  Colonoscopy is recommended at age 48 or sooner if clinically indicated  Follow-up biopsy result  High-fiber diet  If you have abdominal pain, bleeding or fever call my office at [phone]      SIGNATURE: Maxx Villarreal MD  DATE: December 20, 2018  TIME: 9:25 AM

## 2018-12-20 NOTE — H&P
History and Physical - [facility]  Yasmeen Sams 33 y o  female MRN: [id number]    HPI: Yasmeen Sams is a 33y o  year old female who presents for evaluation of diffuse abdominal pain and diarrhea  Review of Systems    Historical Information   Past Medical History:   Diagnosis Date    GERD (gastroesophageal reflux disease)     Obesity      Past Surgical History:   Procedure Laterality Date    HERNIA REPAIR      TUBAL LIGATION       Social History   History   Alcohol Use No     History   Drug Use No     History   Smoking Status    Never Smoker   Smokeless Tobacco    Never Used     History reviewed  No pertinent family history  Meds/Allergies     Prescriptions Prior to Admission   Medication    [employer] PAIN RELIEF EXTRA STRENGTH 500 MG tablet    ibuprofen (MOTRIN) 600 mg tablet    Na Sulfate-K Sulfate-Mg Sulf 17 5-3 13-1 6 GM/177ML SOLN    omeprazole (PriLOSEC) 20 mg delayed release capsule    phentermine (ADIPEX-P) 37 5 MG tablet       Allergies   Allergen Reactions    Septra [Sulfamethoxazole-Trimethoprim] Rash and Shortness Of Breath    Ciprofloxacin Hcl     Zosyn [Piperacillin Sod-Tazobactam So] Hives and Rash       Objective     Blood pressure 121/70, pulse 98, temperature (!) 97 1 °F (36 2 °C), temperature source Temporal, resp  rate 18, height 4' 11" (1 499 m), weight 75 3 kg (166 lb), last menstrual period 12/17/2018, SpO2 100 %  PHYSICAL EXAM    Gen: NAD  CV: RRR  CHEST: Clear  ABD: soft, NT/ND  EXT: no edema  Neuro: AAO      ASSESSMENT/PLAN:  This is a 33y o  year old female here for EGD and colonoscopy for evaluation of abdominal pain and diarrhea      PLAN:   Procedure:  EGD and colonoscopy with biopsy

## 2018-12-20 NOTE — ANESTHESIA PREPROCEDURE EVALUATION
Review of Systems/Medical History  Patient summary reviewed  Chart reviewed  No history of anesthetic complications     Cardiovascular  Exercise tolerance (METS): >4,  No hypertension ,    Pulmonary  Not a smoker , No asthma , No recent URI , Sleep apnea ,        GI/Hepatic    GERD ,             Endo/Other     GYN       Hematology   Musculoskeletal       Neurology   Psychology           Physical Exam    Airway    Mallampati score: III  TM Distance: >3 FB       Dental   No notable dental hx     Cardiovascular      Pulmonary      Other Findings        Anesthesia Plan  ASA Score- 2     Anesthesia Type- IV sedation with anesthesia with ASA Monitors  Additional Monitors:   Airway Plan:     Comment: GITA Jimenez , have personally seen and evaluated the patient prior to anesthetic care  I have reviewed the pre-anesthetic record, and other medical records if appropriate to the anesthetic care  If a CRNA is involved in the case, I have reviewed the CRNA assessment, if present, and agree  Risks/benefits and alternatives discussed with patient including possible PONV, sore throat, and possibility of rare anesthetic and surgical emergencies        Plan Factors- Patient instructed to abstain from smoking on day of procedure  Patient did not smoke on day of surgery  Induction-     Postoperative Plan-     Informed Consent- Anesthetic plan and risks discussed with patient  I personally reviewed this patient with the CRNA  Discussed and agreed on the Anesthesia Plan with the CRNA  aDni Killian

## 2018-12-20 NOTE — OP NOTE
OPERATIVE REPORT  PATIENT NAME: Maira Chau    :  1990  MRN: [id number]  Pt Location: [facility]  GI ROOM 01    SURGERY DATE: 2018    Surgeon(s) and Role:     * Lety Fay MD - Primary    Preop Diagnosis:  Diarrhea, unspecified type [R19 7]  Diffuse abdominal pain [R10 84]  Irritable bowel syndrome with diarrhea [K58 0]    Post-Op Diagnosis Codes:     * Diarrhea, unspecified type [R19 7]     * Diffuse abdominal pain [R10 84]     * Irritable bowel syndrome with diarrhea [K58 0]    Procedure(s) (LRB):  COLONOSCOPY (N/A)  ESOPHAGOGASTRODUODENOSCOPY (EGD) (N/A)    Specimen(s):  ID Type Source Tests Collected by Time Destination   1 : Cold Bx Duodenum Tissue Duodenum TISSUE EXAM Lety Fay MD 2018 0905    2 : Cold Bx Gastric Body Tissue Stomach TISSUE EXAM Lety Fay MD 2018 1575    3 : Random Coldm Bx Colon Tissue Colon TISSUE EXAM Lety Fay MD 2018 0912        Estimated Blood Loss:   Minimal    Colonoscopy Procedure Note    Procedure: Colonoscopy    Sedation: Monitored anesthesia care, check anesthesia records      ASA Class: 2    INDICATIONS:  Abdominal pain and diarrhea    POST-OP DIAGNOSIS: See the impression below    Procedure Details     Prior colonoscopy: No prior colonoscopy  Informed consent was obtained for the procedure, including sedation  Risks of perforation, hemorrhage, adverse drug reaction and aspiration were discussed  The patient was placed in the left lateral decubitus position  Based on the pre-procedure assessment, including review of the patient's medical history, medications, allergies, and review of systems, she had been deemed to be an appropriate candidate for conscious sedation; she was therefore sedated with the medications listed below  The patient was monitored continuously with telemetry, pulse oximetry, blood pressure monitoring, and direct observations  A rectal examination was performed    The colonoscope was inserted into the rectum and advanced under direct vision to the cecum, which was identified by the ileocecal valve and appendiceal orifice  The quality of the colonic preparation was good  A careful inspection was made as the colonoscope was withdrawn, including a retroflexed view of the rectum; findings and interventions are described below  Findings:  Normal colon and terminal ileum  Random biopsies were taken to rule out celiac disease  Complications: None; patient tolerated the procedure well  Impression:    Normal colon and terminal ileum  Random biopsies were taken to rule out celiac disease  Recommendations:  Colonoscopy is recommended at age 48 or sooner if clinically indicated  Follow-up biopsy result  High-fiber diet  If you have abdominal pain, bleeding or fever call my office at [phone]      SIGNATURE: Madelin Reese MD  DATE: December 20, 2018  TIME: 9:25 AM

## 2018-12-20 NOTE — ANESTHESIA POSTPROCEDURE EVALUATION
Post-Op Assessment Note      CV Status:  Stable    Mental Status:  Alert and awake    Hydration Status:  Stable and euvolemic    PONV Controlled:  Controlled    Airway Patency:  Patent and adequate    Post Op Vitals Reviewed: Yes          Staff: CRNA           /58 (12/20/18 0916)    Temp     Pulse 84 (12/20/18 0916)   Resp 20 (12/20/18 0916)    SpO2 99 % (12/20/18 0916)

## 2018-12-20 NOTE — OP NOTE
OPERATIVE REPORT  PATIENT NAME: Song Leal    :  1990  MRN: [id number]  Pt Location: [facility]  GI ROOM 01    SURGERY DATE: 2018    Surgeon(s) and Role:     * Min Azevedo MD - Primary    Preop Diagnosis:  Diarrhea, unspecified type [R19 7]  Diffuse abdominal pain [R10 84]  Irritable bowel syndrome with diarrhea [K58 0]    Post-Op Diagnosis Codes:     * Diarrhea, unspecified type [R19 7]     * Diffuse abdominal pain [R10 84]     * Irritable bowel syndrome with diarrhea [K58 0]    Procedure(s) (LRB):  COLONOSCOPY (N/A)  ESOPHAGOGASTRODUODENOSCOPY (EGD) (N/A)    Specimen(s):  ID Type Source Tests Collected by Time Destination   1 : Cold Bx Duodenum Tissue Duodenum TISSUE EXAM Min Azevedo MD 2018 0905    2 : Cold Bx Gastric Body Tissue Stomach TISSUE EXAM Min Azevedo MD 2018 6865        Estimated Blood Loss:   Minimal    ESOPHAGOGASTRODUODENOSCOPY    PROCEDURE: EGD    SEDATION: Monitored anesthesia care, check anesthesia records    ASA Class: 2    INDICATIONS:  Abdominal pain and change in bowel habit    CONSENT:  Informed consent was obtained for the procedure, including sedation after explaining the risks and benefits of the procedure  Risks including but not limited to bleeding, perforation, infection, and missed lesion  PREPARATION:   Telemetry, pulse oximetry, blood pressure were monitored throughout the procedure  Patient was identified by myself both verbally and by visual inspection of ID band  DESCRIPTION:   Patient was placed in the left lateral decubitus position and was sedated with the above medication  The gastroscope was introduced in to the oropharynx and the esophagus was intubated under direct visualization  Scope was passed down the esophagus up to 2nd part of the duodenum  A careful inspection was made as the gastroscope was withdrawn, including a retroflexed view of the stomach; findings and interventions are described below       FINDINGS:    #1  Esophagus- normal esophagus  GE junction at 35 cm  #2  Stomach- nonerosive gastritis in the antrum  Two cold biopsies were taken from the antrum to rule out H pylori infection  #3  Duodenum- normal bulb and 2nd portion of the duodenum  Four cold biopsies were taken to assess for celiac disease  IMPRESSIONS:      Mild nonerosive gastritis otherwise normal EGD  Biopsies were taken to rule out H pylori infection and celiac disease  RECOMMENDATIONS:     Follow-up biopsy  Colonoscopy to follow    COMPLICATIONS:  None; patient tolerated the procedure well            DISPOSITION: PACU           CONDITION: Stable    SIGNATURE: Mary Kate Sandoval MD  DATE: December 20, 2018  TIME: 9:06 AM

## 2019-01-08 ENCOUNTER — TRANSCRIBE ORDERS (OUTPATIENT)
Dept: ADMINISTRATIVE | Facility: HOSPITAL | Age: 29
End: 2019-01-08

## 2019-01-08 DIAGNOSIS — N28.9 KIDNEY LESION: ICD-10-CM

## 2019-01-08 DIAGNOSIS — R93.89 ABNORMAL FINDING ON ULTRASOUND: Primary | ICD-10-CM

## 2019-01-16 ENCOUNTER — HOSPITAL ENCOUNTER (OUTPATIENT)
Dept: RADIOLOGY | Facility: HOSPITAL | Age: 29
Discharge: HOME | End: 2019-01-16
Payer: COMMERCIAL

## 2019-01-16 DIAGNOSIS — N28.9 KIDNEY LESION: ICD-10-CM

## 2019-01-16 DIAGNOSIS — R93.89 ABNORMAL FINDING ON ULTRASOUND: ICD-10-CM

## 2019-01-16 PROCEDURE — 74178 CT ABD&PLV WO CNTR FLWD CNTR: CPT

## 2019-01-16 RX ADMIN — IOHEXOL 100 ML: 350 INJECTION, SOLUTION INTRAVENOUS at 14:06

## 2019-02-01 ENCOUNTER — APPOINTMENT (EMERGENCY)
Dept: RADIOLOGY | Facility: HOSPITAL | Age: 29
End: 2019-02-01
Payer: COMMERCIAL

## 2019-02-01 ENCOUNTER — HOSPITAL ENCOUNTER (EMERGENCY)
Facility: HOSPITAL | Age: 29
Discharge: HOME | End: 2019-02-02
Attending: EMERGENCY MEDICINE | Admitting: EMERGENCY MEDICINE
Payer: COMMERCIAL

## 2019-02-01 VITALS
HEIGHT: 55 IN | WEIGHT: 165 LBS | OXYGEN SATURATION: 98 % | DIASTOLIC BLOOD PRESSURE: 93 MMHG | TEMPERATURE: 97.3 F | HEART RATE: 99 BPM | SYSTOLIC BLOOD PRESSURE: 139 MMHG | RESPIRATION RATE: 17 BRPM | BODY MASS INDEX: 38.18 KG/M2
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
## 2019-02-01 DIAGNOSIS — S70.01XA CONTUSION OF RIGHT HIP, INITIAL ENCOUNTER: ICD-10-CM

## 2019-02-01 LAB
BILIRUB UR QL STRIP: NEGATIVE
CLARITY UR: CLEAR
COLOR UR: YELLOW
COLOR, POC: YELLOW
GLUCOSE UR STRIP-MCNC: NEGATIVE MG/DL
HGB UR QL STRIP.AUTO: NEGATIVE
KETONES UR STRIP-MCNC: NEGATIVE MG/DL
LEUKOCYTE ESTERASE UR QL STRIP: NEGATIVE
NITRITE UR QL STRIP: NEGATIVE
PH UR STRIP.AUTO: 8 [PH] (ref 4.5–8)
PROT UR STRIP-MCNC: ABNORMAL MG/DL
SP GR UR STRIP.AUTO: 1.01 (ref 1–1.03)
UROBILINOGEN UR QL STRIP.AUTO: 0.2 E.U./DL

## 2019-02-01 PROCEDURE — 99283 EMERGENCY DEPT VISIT LOW MDM: CPT

## 2019-02-01 PROCEDURE — 81025 URINE PREGNANCY TEST: CPT | Performed by: EMERGENCY MEDICINE

## 2019-02-01 PROCEDURE — 72100 X-RAY EXAM L-S SPINE 2/3 VWS: CPT

## 2019-02-01 PROCEDURE — 73502 X-RAY EXAM HIP UNI 2-3 VIEWS: CPT

## 2019-02-02 LAB
BACTERIA UR QL AUTO: ABNORMAL /HPF
EXT PREG TEST URINE: NORMAL
HYALINE CASTS #/AREA URNS LPF: ABNORMAL /LPF
NON-SQ EPI CELLS URNS QL MICRO: ABNORMAL /HPF
RBC #/AREA URNS AUTO: ABNORMAL /HPF
WBC #/AREA URNS AUTO: ABNORMAL /HPF

## 2019-02-02 PROCEDURE — 81001 URINALYSIS AUTO W/SCOPE: CPT

## 2019-02-02 NOTE — ED PROVIDER NOTES
History  Chief Complaint   Patient presents with   [employer] Fall     pt reports mechanical slip on a rug  denies head strike  denies LOC  Reports her back "hurts" and paion radiates to right leg     32 yo F presents to ED for low back pain and R hip pain after fall  Pt says she was outside the front of her home and stepped onto the welcome mat, which slid out from under her, and she fell onto her buttocks  Her  helped her up, and she has been ambulatory since but with pain, especially of the R hip  No numbness/tingling or weakness  No saddle anesthesia, urinary or bowel incontinence or retention  No fever/chills  Prior to Admission Medications   Prescriptions Last Dose Informant Patient Reported? Taking? [employer]S PAIN RELIEF EXTRA STRENGTH 500 MG tablet  Self Yes No   Na Sulfate-K Sulfate-Mg Sulf 17 5-3 13-1 6 GM/177ML SOLN   No No   Sig: Use as directed by office   ibuprofen (MOTRIN) 600 mg tablet  Self Yes No   Sig: Take 1 tablet by mouth every 8 (eight) hours as needed   omeprazole (PriLOSEC) 20 mg delayed release capsule  Self Yes No   Sig: Take 1 tablet by mouth daily   phentermine (ADIPEX-P) 37 5 MG tablet  Self Yes No   Sig: Take by mouth      Facility-Administered Medications: None       Past Medical History:   Diagnosis Date    GERD (gastroesophageal reflux disease)     Obesity        Past Surgical History:   Procedure Laterality Date    HERNIA REPAIR      CA COLONOSCOPY FLX DX W/COLLJ SPEC WHEN PFRMD N/A 12/20/2018    Procedure: COLONOSCOPY;  Surgeon: Seven Hooker MD;  [facility]: [facility]; Service: Gastroenterology    CA ESOPHAGOGASTRODUODENOSCOPY TRANSORAL DIAGNOSTIC N/A 12/20/2018    Procedure: ESOPHAGOGASTRODUODENOSCOPY (EGD); Surgeon: Seven Hooker MD;  [facility]: [facility]; Service: Gastroenterology    TUBAL LIGATION         History reviewed  No pertinent family history  I have reviewed and agree with the history as documented      Social History   Substance Use Topics    Smoking status: Never Smoker    Smokeless tobacco: Never Used    Alcohol use No        Review of Systems   Constitutional: Negative for activity change, chills and fever  HENT: Negative for congestion, rhinorrhea, sore throat and trouble swallowing  Eyes: Negative for pain, discharge and visual disturbance  Respiratory: Negative for cough, chest tightness and shortness of breath  Cardiovascular: Negative for chest pain, palpitations and leg swelling  Gastrointestinal: Negative for abdominal pain, nausea and vomiting  Genitourinary: Negative for dysuria, frequency and urgency  Musculoskeletal: Positive for back pain  Negative for neck pain and neck stiffness  Skin: Negative for color change, rash and wound  Neurological: Negative for dizziness, syncope, light-headedness and headaches  Physical Exam  ED Triage Vitals [02/01/19 2244]   Temperature Pulse Respirations Blood Pressure SpO2   (!) 97 3 °F (36 3 °C) 99 17 139/93 98 %      Temp Source Heart Rate Source Patient Position - Orthostatic VS BP Location FiO2 (%)   Oral -- -- Left arm --      Pain Score       Worst Possible Pain           Orthostatic Vital Signs  Vitals:    02/01/19 2244   BP: 139/93   Pulse: 99       Physical Exam   Constitutional: She is oriented to person, place, and time  She appears well-developed and well-nourished  No distress  HENT:   Head: Normocephalic and atraumatic  Mouth/Throat: Oropharynx is clear and moist    Eyes: Conjunctivae and EOM are normal  Right eye exhibits no discharge  Left eye exhibits no discharge  Neck: Normal range of motion  Neck supple  Cardiovascular: Normal rate, regular rhythm and intact distal pulses  Pulmonary/Chest: Effort normal and breath sounds normal  No stridor  No respiratory distress  She exhibits no tenderness  Abdominal: Soft  There is no tenderness  There is no rebound and no guarding  Musculoskeletal: Normal range of motion   She exhibits tenderness (L spine tenderness, diffuse paraspinous lumbar tenderness as well  R hip tenderness to palpation)  She exhibits no edema  Neurological: She is alert and oriented to person, place, and time  No sensory deficit  She exhibits normal muscle tone  Coordination normal    Skin: Skin is warm and dry  Capillary refill takes less than 2 seconds  Nursing note and vitals reviewed  ED Medications  Medications - No data to display    Diagnostic Studies  Results Reviewed     Procedure Component Value Units Date/Time    Urine Microscopic [[id number]]  (Abnormal) Collected:  02/02/19 0013    Lab Status:  Final result Specimen:  Urine from Urine, Clean Catch Updated:  02/02/19 0027     RBC, UA 4-10 (A) /hpf      WBC, UA None Seen /hpf      Epithelial Cells Occasional /hpf      Bacteria, UA Occasional /hpf      Hyaline Casts, UA None Seen /lpf     POCT pregnancy, urine [[id number]]  (Normal) Resulted:  02/02/19 0013    Lab Status:  Final result Updated:  02/02/19 0014     EXT PREG TEST UR (Ref: Negative) neg    POCT urinalysis dipstick [[id number]]  (Normal) Resulted:  02/01/19 2338    Lab Status:  Final result Updated:  02/01/19 2338     Color, UA yellow    ED Urine Macroscopic [[id number]]  (Abnormal) Collected:  02/01/19 2339    Lab Status:  Final result Specimen:  Urine Updated:  02/01/19 2337     Color, UA Yellow     Clarity, UA Clear     pH, UA 8 0     Leukocytes, UA Negative     Nitrite, UA Negative     Protein, UA Trace (A) mg/dl      Glucose, UA Negative mg/dl      Ketones, UA Negative mg/dl      Urobilinogen, UA 0 2 E U /dl      Bilirubin, UA Negative     Blood, UA Negative     Specific Gravity, UA 1 015    Narrative:       CLINITEK RESULT                 XR spine lumbar 2 or 3 views injury   ED Interpretation by Ashlyn Shahid MD (02/02 0103)   No acute osseous abnormality as interpreted by myself  Final Result by Anderson Caretr DO (02/02 3744)   No acute fracture or traumatic malalignment        Workstation performed: [id number]         XR hip/pelv 2-3 vws right if performed   ED Interpretation by Zak Bautista MD (02/02 0103)   No acute osseous abnormality as interpreted by myself  Final Result by Freda Miller DO (02/02 8405)   No acute osseous abnormality  Workstation performed: [id number]               Procedures  Procedures      Phone Consults  ED Phone Contact    ED Course                               MDM  Number of Diagnoses or Management Options  Contusion of right hip, initial encounter:   Fall, initial encounter:   Diagnosis management comments: Pending urine preg and XR L spine and hip/pelvis  Pt signed out to Dr Geovanny Mars  Likely discharge if imaging negative  Disposition  Final diagnoses:   Fall, initial encounter   Contusion of right hip, initial encounter     Time reflects when diagnosis was documented in both MDM as applicable and the Disposition within this note     Time User Action Codes Description Comment    2/2/2019  1:03 AM Clemetine Livings P Add [I40  Yaquelin Arrow Fall, initial encounter     2/2/2019  1:04 AM Clemetine Livings P Add [S70 01XA] Contusion of right hip, initial encounter       ED Disposition     ED Disposition Condition Date/Time Comment    Discharge  Sat Feb 2, 2019  1:04 AM An [place] discharge to home/self care      Condition at discharge: Stable        Follow-up Information     Follow up With Specialties Details Why Contact Info Additional [address] Emergency Department Emergency Medicine Go to If symptoms worsen [address]  [phone]  ED, [address] [place], [place], [address], [address] [facility] Nurse Practitioner Go to As needed [address] [facility]  [phone]             Discharge Medication List as of 2/2/2019  1:04 AM      CONTINUE these medications which have NOT CHANGED    Details   [employer] PAIN RELIEF EXTRA STRENGTH 500 MG tablet Starting Tue 10/30/2018, Historical Med      ibuprofen (MOTRIN) 600 mg tablet Take 1 tablet by mouth every 8 (eight) hours as needed, Starting Mon 4/4/2016, Historical Med      Na Sulfate-K Sulfate-Mg Sulf 17 5-3 13-1 6 GM/177ML SOLN Use as directed by office, Normal      omeprazole (PriLOSEC) 20 mg delayed release capsule Take 1 tablet by mouth daily, Starting Tue 2/28/2017, Historical Med      phentermine (ADIPEX-P) 37 5 MG tablet Take by mouth, Historical Med           No discharge procedures on file  ED Provider  Attending physically available and evaluated Kwameshaheennabil Bejaranos  I managed the patient along with the ED Attending      Electronically Signed by         Byron Crandall MD  02/04/19 9320

## 2019-02-02 NOTE — ED ATTENDING ATTESTATION
Raysa Garcia DO, saw and evaluated the patient  I have discussed the patient with the resident/non-physician practitioner and agree with the resident's/non-physician practitioner's findings, Plan of Care, and MDM as documented in the resident's/non-physician practitioner's note, except where noted  All available labs and Radiology studies were reviewed  At this point I agree with the current assessment done in the Emergency Department  I have conducted an independent evaluation of this patient a history and physical is as follows:    34 yo female fell onto buttocks after slipping on a mat outside her house  Has been ambulatory c/o low back pain and R hip pain since fall earlier tonight  Denies focal weakness/numbness/tingling  No midline Lspine TTP  EHL 5/5 B  Equal sensation over S2 B  Imp: low back pain, R hip pain s/p fall  Plan: films, offer analgesia, reassess        Critical Care Time  Procedures

## 2019-02-10 ENCOUNTER — HOSPITAL ENCOUNTER (INPATIENT)
Facility: HOSPITAL | Age: 29
LOS: 4 days | Discharge: HOME HEALTH | DRG: 310 | End: 2019-02-16
Attending: EMERGENCY MEDICINE | Admitting: GENERAL PRACTICE
Payer: COMMERCIAL

## 2019-02-10 ENCOUNTER — APPOINTMENT (EMERGENCY)
Dept: RADIOLOGY | Facility: HOSPITAL | Age: 29
DRG: 310 | End: 2019-02-10
Payer: COMMERCIAL

## 2019-02-10 DIAGNOSIS — M51.27 HERNIATION OF INTERVERTEBRAL DISC BETWEEN L5 AND S1: Primary | ICD-10-CM

## 2019-02-10 DIAGNOSIS — M54.40 BACK PAIN OF LUMBOSACRAL REGION WITH SCIATICA: ICD-10-CM

## 2019-02-10 DIAGNOSIS — Z98.890 POSTOPERATIVE STATE: ICD-10-CM

## 2019-02-10 DIAGNOSIS — M54.9 BACK PAIN: ICD-10-CM

## 2019-02-10 DIAGNOSIS — R26.2 AMBULATORY DYSFUNCTION: ICD-10-CM

## 2019-02-10 DIAGNOSIS — R10.9 ABDOMINAL PAIN: ICD-10-CM

## 2019-02-10 DIAGNOSIS — G95.20 SPINAL CORD COMPRESSION (HCC): ICD-10-CM

## 2019-02-10 DIAGNOSIS — M54.17 LUMBOSACRAL RADICULOPATHY: ICD-10-CM

## 2019-02-10 LAB
ALBUMIN SERPL BCP-MCNC: 3.8 G/DL (ref 3.5–5)
ALP SERPL-CCNC: 54 U/L (ref 46–116)
ALT SERPL W P-5'-P-CCNC: 29 U/L (ref 12–78)
ANION GAP SERPL CALCULATED.3IONS-SCNC: 6 MMOL/L (ref 4–13)
AST SERPL W P-5'-P-CCNC: 21 U/L (ref 5–45)
BACTERIA UR QL AUTO: ABNORMAL /HPF
BASOPHILS # BLD AUTO: 0.03 THOUSANDS/ΜL (ref 0–0.1)
BASOPHILS NFR BLD AUTO: 0 % (ref 0–1)
BILIRUB SERPL-MCNC: 0.57 MG/DL (ref 0.2–1)
BILIRUB UR QL STRIP: NEGATIVE
BUN SERPL-MCNC: 13 MG/DL (ref 5–25)
CALCIUM SERPL-MCNC: 8.9 MG/DL (ref 8.3–10.1)
CHLORIDE SERPL-SCNC: 105 MMOL/L (ref 100–108)
CLARITY UR: ABNORMAL
CO2 SERPL-SCNC: 28 MMOL/L (ref 21–32)
COLOR UR: YELLOW
CREAT SERPL-MCNC: 0.83 MG/DL (ref 0.6–1.3)
EOSINOPHIL # BLD AUTO: 0.05 THOUSAND/ΜL (ref 0–0.61)
EOSINOPHIL NFR BLD AUTO: 1 % (ref 0–6)
ERYTHROCYTE [DISTWIDTH] IN BLOOD BY AUTOMATED COUNT: 12.1 % (ref 11.6–15.1)
EXT PREG TEST URINE: NEGATIVE
GFR SERPL CREATININE-BSD FRML MDRD: 96 ML/MIN/1.73SQ M
GLUCOSE SERPL-MCNC: 143 MG/DL (ref 65–140)
GLUCOSE UR STRIP-MCNC: NEGATIVE MG/DL
HCT VFR BLD AUTO: 44.8 % (ref 34.8–46.1)
HGB BLD-MCNC: 14.7 G/DL (ref 11.5–15.4)
HGB UR QL STRIP.AUTO: ABNORMAL
HYALINE CASTS #/AREA URNS LPF: ABNORMAL /LPF
IMM GRANULOCYTES # BLD AUTO: 0.1 THOUSAND/UL (ref 0–0.2)
IMM GRANULOCYTES NFR BLD AUTO: 1 % (ref 0–2)
KETONES UR STRIP-MCNC: NEGATIVE MG/DL
LEUKOCYTE ESTERASE UR QL STRIP: NEGATIVE
LIPASE SERPL-CCNC: 246 U/L (ref 73–393)
LYMPHOCYTES # BLD AUTO: 1.1 THOUSANDS/ΜL (ref 0.6–4.47)
LYMPHOCYTES NFR BLD AUTO: 11 % (ref 14–44)
MCH RBC QN AUTO: 32.7 PG (ref 26.8–34.3)
MCHC RBC AUTO-ENTMCNC: 32.8 G/DL (ref 31.4–37.4)
MCV RBC AUTO: 100 FL (ref 82–98)
MONOCYTES # BLD AUTO: 0.27 THOUSAND/ΜL (ref 0.17–1.22)
MONOCYTES NFR BLD AUTO: 3 % (ref 4–12)
NEUTROPHILS # BLD AUTO: 8.79 THOUSANDS/ΜL (ref 1.85–7.62)
NEUTS SEG NFR BLD AUTO: 84 % (ref 43–75)
NITRITE UR QL STRIP: NEGATIVE
NON-SQ EPI CELLS URNS QL MICRO: ABNORMAL /HPF
NRBC BLD AUTO-RTO: 0 /100 WBCS
PH UR STRIP.AUTO: 6 [PH] (ref 4.5–8)
PLATELET # BLD AUTO: 208 THOUSANDS/UL (ref 149–390)
PMV BLD AUTO: 9.8 FL (ref 8.9–12.7)
POTASSIUM SERPL-SCNC: 4.2 MMOL/L (ref 3.5–5.3)
PROT SERPL-MCNC: 7.3 G/DL (ref 6.4–8.2)
PROT UR STRIP-MCNC: NEGATIVE MG/DL
RBC # BLD AUTO: 4.5 MILLION/UL (ref 3.81–5.12)
RBC #/AREA URNS AUTO: ABNORMAL /HPF
SODIUM SERPL-SCNC: 139 MMOL/L (ref 136–145)
SP GR UR STRIP.AUTO: 1.01 (ref 1–1.03)
UROBILINOGEN UR QL STRIP.AUTO: 0.2 E.U./DL
WBC # BLD AUTO: 10.34 THOUSAND/UL (ref 4.31–10.16)
WBC #/AREA URNS AUTO: ABNORMAL /HPF

## 2019-02-10 PROCEDURE — 74177 CT ABD & PELVIS W/CONTRAST: CPT

## 2019-02-10 PROCEDURE — 80053 COMPREHEN METABOLIC PANEL: CPT | Performed by: PHYSICIAN ASSISTANT

## 2019-02-10 PROCEDURE — 96361 HYDRATE IV INFUSION ADD-ON: CPT

## 2019-02-10 PROCEDURE — 96374 THER/PROPH/DIAG INJ IV PUSH: CPT

## 2019-02-10 PROCEDURE — 83690 ASSAY OF LIPASE: CPT | Performed by: PHYSICIAN ASSISTANT

## 2019-02-10 PROCEDURE — 99223 1ST HOSP IP/OBS HIGH 75: CPT | Performed by: INTERNAL MEDICINE

## 2019-02-10 PROCEDURE — 85025 COMPLETE CBC W/AUTO DIFF WBC: CPT | Performed by: PHYSICIAN ASSISTANT

## 2019-02-10 PROCEDURE — 99285 EMERGENCY DEPT VISIT HI MDM: CPT

## 2019-02-10 PROCEDURE — 36415 COLL VENOUS BLD VENIPUNCTURE: CPT | Performed by: PHYSICIAN ASSISTANT

## 2019-02-10 PROCEDURE — 81025 URINE PREGNANCY TEST: CPT | Performed by: PHYSICIAN ASSISTANT

## 2019-02-10 PROCEDURE — 81001 URINALYSIS AUTO W/SCOPE: CPT | Performed by: PHYSICIAN ASSISTANT

## 2019-02-10 PROCEDURE — 96372 THER/PROPH/DIAG INJ SC/IM: CPT

## 2019-02-10 RX ORDER — NAPROXEN 500 MG/1
500 TABLET ORAL 2 TIMES DAILY WITH MEALS
Qty: 20 TABLET | Refills: 0 | Status: SHIPPED | OUTPATIENT
Start: 2019-02-10 | End: 2019-02-10 | Stop reason: ALTCHOICE

## 2019-02-10 RX ORDER — PREDNISONE 20 MG/1
40 TABLET ORAL DAILY
Qty: 5 TABLET | Refills: 0 | Status: SHIPPED | OUTPATIENT
Start: 2019-02-10 | End: 2019-02-16 | Stop reason: HOSPADM

## 2019-02-10 RX ORDER — BACLOFEN 10 MG/1
10 TABLET ORAL 2 TIMES DAILY
Status: DISCONTINUED | OUTPATIENT
Start: 2019-02-10 | End: 2019-02-12

## 2019-02-10 RX ORDER — PREDNISONE 20 MG/1
60 TABLET ORAL ONCE
Status: COMPLETED | OUTPATIENT
Start: 2019-02-10 | End: 2019-02-10

## 2019-02-10 RX ORDER — DEXAMETHASONE SODIUM PHOSPHATE 4 MG/ML
4 INJECTION, SOLUTION INTRA-ARTICULAR; INTRALESIONAL; INTRAMUSCULAR; INTRAVENOUS; SOFT TISSUE EVERY 8 HOURS SCHEDULED
Status: DISCONTINUED | OUTPATIENT
Start: 2019-02-11 | End: 2019-02-12

## 2019-02-10 RX ORDER — PREDNISONE 20 MG/1
TABLET ORAL
Qty: 10 TABLET | Refills: 0 | Status: SHIPPED | OUTPATIENT
Start: 2019-02-10 | End: 2019-02-10 | Stop reason: ALTCHOICE

## 2019-02-10 RX ORDER — DOCUSATE SODIUM 100 MG/1
100 CAPSULE, LIQUID FILLED ORAL 2 TIMES DAILY PRN
Status: DISCONTINUED | OUTPATIENT
Start: 2019-02-10 | End: 2019-02-16 | Stop reason: HOSPADM

## 2019-02-10 RX ORDER — IBUPROFEN 600 MG/1
600 TABLET ORAL EVERY 8 HOURS PRN
Status: DISCONTINUED | OUTPATIENT
Start: 2019-02-10 | End: 2019-02-16 | Stop reason: HOSPADM

## 2019-02-10 RX ORDER — METHOCARBAMOL 500 MG/1
500 TABLET, FILM COATED ORAL ONCE
Status: COMPLETED | OUTPATIENT
Start: 2019-02-10 | End: 2019-02-10

## 2019-02-10 RX ORDER — LIDOCAINE 50 MG/G
1 PATCH TOPICAL DAILY
Status: COMPLETED | OUTPATIENT
Start: 2019-02-11 | End: 2019-02-11

## 2019-02-10 RX ORDER — NAPROXEN 500 MG/1
500 TABLET ORAL 2 TIMES DAILY WITH MEALS
Qty: 30 TABLET | Refills: 0 | Status: SHIPPED | OUTPATIENT
Start: 2019-02-10 | End: 2019-02-14 | Stop reason: HOSPADM

## 2019-02-10 RX ORDER — METHOCARBAMOL 500 MG/1
500 TABLET, FILM COATED ORAL 2 TIMES DAILY
Qty: 10 TABLET | Refills: 0 | Status: SHIPPED | OUTPATIENT
Start: 2019-02-10 | End: 2019-02-14 | Stop reason: HOSPADM

## 2019-02-10 RX ORDER — PANTOPRAZOLE SODIUM 40 MG/1
40 TABLET, DELAYED RELEASE ORAL
Status: DISCONTINUED | OUTPATIENT
Start: 2019-02-11 | End: 2019-02-16 | Stop reason: HOSPADM

## 2019-02-10 RX ORDER — ONDANSETRON 2 MG/ML
4 INJECTION INTRAMUSCULAR; INTRAVENOUS EVERY 6 HOURS PRN
Status: DISCONTINUED | OUTPATIENT
Start: 2019-02-10 | End: 2019-02-16 | Stop reason: HOSPADM

## 2019-02-10 RX ORDER — MORPHINE SULFATE 4 MG/ML
4 INJECTION, SOLUTION INTRAMUSCULAR; INTRAVENOUS ONCE
Status: COMPLETED | OUTPATIENT
Start: 2019-02-10 | End: 2019-02-10

## 2019-02-10 RX ORDER — METHOCARBAMOL 500 MG/1
TABLET, FILM COATED ORAL
Qty: 20 TABLET | Refills: 0 | Status: SHIPPED | OUTPATIENT
Start: 2019-02-10 | End: 2019-02-10 | Stop reason: ALTCHOICE

## 2019-02-10 RX ORDER — LIDOCAINE 50 MG/G
1 PATCH TOPICAL ONCE
Status: COMPLETED | OUTPATIENT
Start: 2019-02-10 | End: 2019-02-11

## 2019-02-10 RX ORDER — TRAMADOL HYDROCHLORIDE 50 MG/1
50 TABLET ORAL ONCE
Status: COMPLETED | OUTPATIENT
Start: 2019-02-10 | End: 2019-02-10

## 2019-02-10 RX ORDER — KETOROLAC TROMETHAMINE 30 MG/ML
30 INJECTION, SOLUTION INTRAMUSCULAR; INTRAVENOUS ONCE
Status: COMPLETED | OUTPATIENT
Start: 2019-02-10 | End: 2019-02-10

## 2019-02-10 RX ORDER — MORPHINE SULFATE 4 MG/ML
4 INJECTION, SOLUTION INTRAMUSCULAR; INTRAVENOUS EVERY 4 HOURS PRN
Status: DISCONTINUED | OUTPATIENT
Start: 2019-02-10 | End: 2019-02-12

## 2019-02-10 RX ADMIN — LIDOCAINE 1 PATCH: 50 PATCH CUTANEOUS at 14:58

## 2019-02-10 RX ADMIN — BACLOFEN 10 MG: 10 TABLET ORAL at 22:20

## 2019-02-10 RX ADMIN — TRAMADOL HYDROCHLORIDE 50 MG: 50 TABLET, COATED ORAL at 22:47

## 2019-02-10 RX ADMIN — IOHEXOL 100 ML: 350 INJECTION, SOLUTION INTRAVENOUS at 18:55

## 2019-02-10 RX ADMIN — MORPHINE SULFATE 4 MG: 4 INJECTION INTRAVENOUS at 16:58

## 2019-02-10 RX ADMIN — KETOROLAC TROMETHAMINE 30 MG: 30 INJECTION, SOLUTION INTRAMUSCULAR at 14:57

## 2019-02-10 RX ADMIN — SODIUM CHLORIDE 1000 ML: 0.9 INJECTION, SOLUTION INTRAVENOUS at 16:57

## 2019-02-10 RX ADMIN — PREDNISONE 60 MG: 20 TABLET ORAL at 14:57

## 2019-02-10 RX ADMIN — METHOCARBAMOL 500 MG: 500 TABLET, FILM COATED ORAL at 14:57

## 2019-02-10 NOTE — ED NOTES
Pt and s/o aware of needed urine sample  Pt unable to void at this time        Alfonso Morales RN  02/10/19 4078

## 2019-02-10 NOTE — ED PROVIDER NOTES
History  Chief Complaint   Patient presents with    Back Pain     pt with back pain x1 month  see here for same and given motrin  today while brushing teeth pain became worse  pt refusing to walk or move  pt crying in pain  pt states she has been taking motrin with no relief  denies injury  Patient is a 42-year-old primarily Sami-speaking female here with  who is helping translate reports that she has had back pain for about a month now that became acutely worse this morning while she was brushing her teeth  She denies any injury or trauma to the area  She was seen here after a fall with low back pain and right hip contusion on February 1st and states she followed up with PCP who referred her for PT which she is supposed to start tomorrow morning  She reports pain in the middle of her low back that radiates down both buttocks and upper legs left side worse than right with some a tingling on the left  Pain is worse with position changes and better at rest   She denies fevers, chills, urinary symptoms, change in bowel or bladder function, saddle anesthesia  ROS:  Gen: no fevers, chills, fatigue  Heart: no chest pain, palpitations  Lungs: no cough, chest tightness, wheezing, sob  GI: no abdominal pain, nausea, vomiting, no change in bowel function  Gu: no change in bladder function, dysuria, hesitancy  Skin: no rash  Neuro: no numbness, saddle anesthesia, weakness  Ms: +back pain, no arthralgias  All systems otherwise negative except as recorded above     Physical:  Vitals reviewed    Gen: well nourished, cooperative, comfortable in no acute distress  Eyes: PERRL, EOM's intact  Ears: external ears normal  Nose: no active drainage  Mouth: mucous membranes moist, pos pharynx clear without erythema/edema, uvula is midline  Neck: supple, no masses, no lymphadenopathy  No cervical midline TTP, no paraspinal muscle TTP, FROM intact  Heart: regular rate and rhythm, no murmurs  Lungs: clear bilat, no wheezes, rales, rhonchi, no tachypnea  Abd: soft, +BS, NTTP, no guarding, no rebound  Skin: no rash, good skin turgor  Back: Spine appears normal without deformity, edema, erythema  no midline TTP, +lower lumbar paraspinal muscle TTP b/l  Musc: BL lower extremity muscle strength 5/5, neg SLR b/l  Neuro: Non-focal  No sensory deficits noted  No motor deficits noted  DTR 2+ B/L             Prior to Admission Medications   Prescriptions Last Dose Informant Patient Reported? Taking? [employer] PAIN RELIEF EXTRA STRENGTH 500 MG tablet  Self Yes Yes   Na Sulfate-K Sulfate-Mg Sulf 17 5-3 13-1 6 GM/177ML SOLN   No Yes   Sig: Use as directed by office   ibuprofen (MOTRIN) 600 mg tablet  Self Yes Yes   Sig: Take 1 tablet by mouth every 8 (eight) hours as needed   omeprazole (PriLOSEC) 20 mg delayed release capsule  Self Yes Yes   Sig: Take 1 tablet by mouth daily   phentermine (ADIPEX-P) 37 5 MG tablet  Self Yes Yes   Sig: Take by mouth      Facility-Administered Medications: None       Past Medical History:   Diagnosis Date    GERD (gastroesophageal reflux disease)     Obesity        Past Surgical History:   Procedure Laterality Date    HERNIA REPAIR      CA COLONOSCOPY FLX DX W/COLLJ SPEC WHEN PFRMD N/A 12/20/2018    Procedure: COLONOSCOPY;  Surgeon: Nelly Lovett MD;  Location: [facility]; Service: Gastroenterology    CA ESOPHAGOGASTRODUODENOSCOPY TRANSORAL DIAGNOSTIC N/A 12/20/2018    Procedure: ESOPHAGOGASTRODUODENOSCOPY (EGD); Surgeon: Nelly Lovett MD;  Location: [facility]; Service: Gastroenterology    TUBAL LIGATION         History reviewed  No pertinent family history  I have reviewed and agree with the history as documented      Social History     Tobacco Use    Smoking status: Never Smoker    Smokeless tobacco: Never Used   Substance Use Topics    Alcohol use: No    Drug use: No        Review of Systems    Physical Exam  Physical Exam    Vital Signs  ED Triage Vitals [02/10/19 1358]   Temperature Pulse Respirations Blood Pressure SpO2   98 1 °F (36 7 °C) 90 22 134/78 98 %      Temp src Heart Rate Source Patient Position - Orthostatic VS BP Location FiO2 (%)   -- -- -- -- --      Pain Score       9           Vitals:    02/10/19 1358   BP: 134/78   Pulse: 90       Visual Acuity      ED Medications  Medications   lidocaine (LIDODERM) 5 % patch 1 patch (1 patch Topical Medication Applied 2/10/19 1458)   sodium chloride 0 9 % bolus 1,000 mL (1,000 mL Intravenous New Bag 2/10/19 1657)   ketorolac (TORADOL) injection 30 mg (30 mg Intramuscular Given 2/10/19 1457)   predniSONE tablet 60 mg (60 mg Oral Given 2/10/19 1457)   methocarbamol (ROBAXIN) tablet 500 mg (500 mg Oral Given 2/10/19 1457)   morphine (PF) 4 mg/mL injection 4 mg (4 mg Intravenous Given 2/10/19 1658)       Diagnostic Studies  Results Reviewed     Procedure Component Value Units Date/Time    Urine Microscopic [[id number]]  (Abnormal) Collected:  02/10/19 1657    Lab Status:  Final result Specimen:  Urine, Clean Catch Updated:  02/10/19 1738     RBC, UA None Seen /hpf      WBC, UA 2-4 /hpf      Epithelial Cells Moderate /hpf      Bacteria, UA Occasional /hpf      Hyaline Casts, UA None Seen /lpf     UA w Reflex to Microscopic w Reflex to Culture [[id number]]  (Abnormal) Collected:  02/10/19 1657    Lab Status:  Final result Specimen:  Urine, Clean Catch Updated:  02/10/19 1736     Color, UA Yellow     Clarity, UA Cloudy     Specific Gravity, UA 1 012     pH, UA 6 0     Leukocytes, UA Negative     Nitrite, UA Negative     Protein, UA Negative mg/dl      Glucose, UA Negative mg/dl      Ketones, UA Negative mg/dl      Urobilinogen, UA 0 2 E U /dl      Bilirubin, UA Negative     Blood, UA Trace    Comprehensive metabolic panel [[id number]]  (Abnormal) Collected:  02/10/19 1656    Lab Status:  Final result Specimen:  Blood from Arm, Right Updated:  02/10/19 1735     Sodium 139 mmol/L      Potassium 4 2 mmol/L      Chloride 105 mmol/L      CO2 28 mmol/L      ANION GAP 6 mmol/L      BUN 13 mg/dL      Creatinine 0 83 mg/dL      Glucose 143 mg/dL      Calcium 8 9 mg/dL      AST 21 U/L      ALT 29 U/L      Alkaline Phosphatase 54 U/L      Total Protein 7 3 g/dL      Albumin 3 8 g/dL      Total Bilirubin 0 57 mg/dL      eGFR 96 ml/min/1 73sq m     Narrative:       National Kidney Disease Education Program recommendations are as follows:  GFR calculation is accurate only with a steady state creatinine  Chronic Kidney disease less than 60 ml/min/1 73 sq  meters  Kidney failure less than 15 ml/min/1 73 sq  meters  Lipase [[id number]]  (Normal) Collected:  02/10/19 1656    Lab Status:  Final result Specimen:  Blood from Arm, Right Updated:  02/10/19 1735     Lipase 246 u/L     CBC and differential [[id number]]  (Abnormal) Collected:  02/10/19 1656    Lab Status:  Final result Specimen:  Blood from Arm, Right Updated:  02/10/19 1715     WBC 10 34 Thousand/uL      RBC 4 50 Million/uL      Hemoglobin 14 7 g/dL      Hematocrit 44 8 %       fL      MCH 32 7 pg      MCHC 32 8 g/dL      RDW 12 1 %      MPV 9 8 fL      Platelets 739 Thousands/uL      nRBC 0 /100 WBCs      Neutrophils Relative 84 %      Immat GRANS % 1 %      Lymphocytes Relative 11 %      Monocytes Relative 3 %      Eosinophils Relative 1 %      Basophils Relative 0 %      Neutrophils Absolute 8 79 Thousands/µL      Immature Grans Absolute 0 10 Thousand/uL      Lymphocytes Absolute 1 10 Thousands/µL      Monocytes Absolute 0 27 Thousand/µL      Eosinophils Absolute 0 05 Thousand/µL      Basophils Absolute 0 03 Thousands/µL     POCT pregnancy, urine [[id number]]     Lab Status:  No result                  CT abdomen pelvis with contrast    (Results Pending)              Procedures  Procedures       Phone Contacts  ED Phone Contact    ED Course  ED Course as of Feb 10 1755   Sun Feb 10, 2019   1737 CBC, CMP, lipase and urinalysis unremarkable                                    MDM  Number of Diagnoses or Management Options  Abdominal pain:   Back pain:   Diagnosis management comments: Plan to check U preg give Toradol, prednisone, Robaxin and Lidoderm patch for symptoms and reassess  1600 upon reassessment patient states she has had almost no improvement of her pain level  Now reports lower abdominal pain mostly right lower quadrant that she has had for the past 2 weeks has been intermittent now returned  Review systems is still negative for nausea, vomiting, urinary symptoms  Further denies vaginal discharge or dyspareunia  Stools have been somewhat loose for the past 2 weeks as well  No recent travel  Abdomen re-examined soft, positive bowel sounds positive right lower quadrant tenderness to palpation  Will do a complete abdominal workup to include labs and urinalysis  Will start fluids and give morphine for pain then reassess  Will CT abdomen and pelvis for further evaluation rule out differential to include but not limited to appendicitis, kidney stone, colitis, ovarian cyst  1750 patient signed out to [facility] secondary to shift change with CT pending  Disposition  Final diagnoses:   Back pain   Abdominal pain     Time reflects when diagnosis was documented in both MDM as applicable and the Disposition within this note     Time User Action Codes Description Comment    2/10/2019  5:53 PM Bernardino Bernheim L Add [M54 9] Back pain     2/10/2019  5:53 PM Bernardino Bernheim L Add [R10 9] Abdominal pain       ED Disposition     None      Follow-up Information    None         Patient's Medications   Discharge Prescriptions    METHOCARBAMOL (ROBAXIN) 500 MG TABLET    Take 1-2 tablets twice daily as needed for muscle spasm  May cause sedation  Do not drive while taking         Start Date: 2/10/2019 End Date: --       Order Dose: --       Quantity: 20 tablet    Refills: 0    NAPROXEN (NAPROSYN) 500 MG TABLET    Take 1 tablet (500 mg total) by mouth 2 (two) times a day with meals       Start Date: 2/10/2019 End Date: --       Order Dose: 500 mg       Quantity: 20 tablet    Refills: 0    PREDNISONE 20 MG TABLET    Take 2 tablets together once daily in am with food       Start Date: 2/10/2019 End Date: --       Order Dose: --       Quantity: 10 tablet    Refills: 0     No discharge procedures on file      ED Provider  Electronically Signed by           Tricia Esteves PA-C  02/10/19 3888

## 2019-02-11 ENCOUNTER — APPOINTMENT (OUTPATIENT)
Dept: RADIOLOGY | Facility: HOSPITAL | Age: 29
DRG: 310 | End: 2019-02-11
Payer: COMMERCIAL

## 2019-02-11 LAB
ANION GAP SERPL CALCULATED.3IONS-SCNC: 5 MMOL/L (ref 4–13)
APTT PPP: 25 SECONDS (ref 26–38)
BASOPHILS # BLD AUTO: 0.02 THOUSANDS/ΜL (ref 0–0.1)
BASOPHILS NFR BLD AUTO: 0 % (ref 0–1)
BUN SERPL-MCNC: 12 MG/DL (ref 5–25)
CALCIUM SERPL-MCNC: 8.7 MG/DL (ref 8.3–10.1)
CHLORIDE SERPL-SCNC: 108 MMOL/L (ref 100–108)
CO2 SERPL-SCNC: 26 MMOL/L (ref 21–32)
CREAT SERPL-MCNC: 0.72 MG/DL (ref 0.6–1.3)
EOSINOPHIL # BLD AUTO: 0.04 THOUSAND/ΜL (ref 0–0.61)
EOSINOPHIL NFR BLD AUTO: 0 % (ref 0–6)
ERYTHROCYTE [DISTWIDTH] IN BLOOD BY AUTOMATED COUNT: 12.1 % (ref 11.6–15.1)
GFR SERPL CREATININE-BSD FRML MDRD: 114 ML/MIN/1.73SQ M
GLUCOSE SERPL-MCNC: 90 MG/DL (ref 65–140)
HCG SERPL QL: NEGATIVE
HCT VFR BLD AUTO: 41 % (ref 34.8–46.1)
HGB BLD-MCNC: 13.5 G/DL (ref 11.5–15.4)
IMM GRANULOCYTES # BLD AUTO: 0.1 THOUSAND/UL (ref 0–0.2)
IMM GRANULOCYTES NFR BLD AUTO: 1 % (ref 0–2)
INR PPP: 1.02 (ref 0.86–1.17)
LYMPHOCYTES # BLD AUTO: 2.22 THOUSANDS/ΜL (ref 0.6–4.47)
LYMPHOCYTES NFR BLD AUTO: 13 % (ref 14–44)
MAGNESIUM SERPL-MCNC: 2.2 MG/DL (ref 1.6–2.6)
MCH RBC QN AUTO: 32.5 PG (ref 26.8–34.3)
MCHC RBC AUTO-ENTMCNC: 32.9 G/DL (ref 31.4–37.4)
MCV RBC AUTO: 99 FL (ref 82–98)
MONOCYTES # BLD AUTO: 1.21 THOUSAND/ΜL (ref 0.17–1.22)
MONOCYTES NFR BLD AUTO: 7 % (ref 4–12)
NEUTROPHILS # BLD AUTO: 13.73 THOUSANDS/ΜL (ref 1.85–7.62)
NEUTS SEG NFR BLD AUTO: 79 % (ref 43–75)
NRBC BLD AUTO-RTO: 0 /100 WBCS
PHOSPHATE SERPL-MCNC: 2.6 MG/DL (ref 2.7–4.5)
PLATELET # BLD AUTO: 213 THOUSANDS/UL (ref 149–390)
PMV BLD AUTO: 10.5 FL (ref 8.9–12.7)
POTASSIUM SERPL-SCNC: 3.8 MMOL/L (ref 3.5–5.3)
PROTHROMBIN TIME: 13.5 SECONDS (ref 11.8–14.2)
RBC # BLD AUTO: 4.16 MILLION/UL (ref 3.81–5.12)
SODIUM SERPL-SCNC: 139 MMOL/L (ref 136–145)
WBC # BLD AUTO: 17.32 THOUSAND/UL (ref 4.31–10.16)

## 2019-02-11 PROCEDURE — 80048 BASIC METABOLIC PNL TOTAL CA: CPT | Performed by: INTERNAL MEDICINE

## 2019-02-11 PROCEDURE — 85610 PROTHROMBIN TIME: CPT | Performed by: INTERNAL MEDICINE

## 2019-02-11 PROCEDURE — 83735 ASSAY OF MAGNESIUM: CPT | Performed by: INTERNAL MEDICINE

## 2019-02-11 PROCEDURE — 84100 ASSAY OF PHOSPHORUS: CPT | Performed by: INTERNAL MEDICINE

## 2019-02-11 PROCEDURE — 84703 CHORIONIC GONADOTROPIN ASSAY: CPT | Performed by: NURSE PRACTITIONER

## 2019-02-11 PROCEDURE — 85730 THROMBOPLASTIN TIME PARTIAL: CPT | Performed by: INTERNAL MEDICINE

## 2019-02-11 PROCEDURE — 72148 MRI LUMBAR SPINE W/O DYE: CPT

## 2019-02-11 PROCEDURE — 99225 PR SBSQ OBSERVATION CARE/DAY 25 MINUTES: CPT | Performed by: NURSE PRACTITIONER

## 2019-02-11 PROCEDURE — 99223 1ST HOSP IP/OBS HIGH 75: CPT | Performed by: NEUROLOGICAL SURGERY

## 2019-02-11 PROCEDURE — 85025 COMPLETE CBC W/AUTO DIFF WBC: CPT | Performed by: INTERNAL MEDICINE

## 2019-02-11 RX ADMIN — DEXAMETHASONE SODIUM PHOSPHATE 4 MG: 4 INJECTION, SOLUTION INTRAMUSCULAR; INTRAVENOUS at 22:00

## 2019-02-11 RX ADMIN — BACLOFEN 10 MG: 10 TABLET ORAL at 09:06

## 2019-02-11 RX ADMIN — IBUPROFEN 600 MG: 600 TABLET ORAL at 14:32

## 2019-02-11 RX ADMIN — MORPHINE SULFATE 4 MG: 4 INJECTION INTRAVENOUS at 18:01

## 2019-02-11 RX ADMIN — DEXAMETHASONE SODIUM PHOSPHATE 4 MG: 4 INJECTION, SOLUTION INTRAMUSCULAR; INTRAVENOUS at 14:32

## 2019-02-11 RX ADMIN — PANTOPRAZOLE SODIUM 40 MG: 40 TABLET, DELAYED RELEASE ORAL at 05:43

## 2019-02-11 RX ADMIN — DEXAMETHASONE SODIUM PHOSPHATE 4 MG: 4 INJECTION, SOLUTION INTRAMUSCULAR; INTRAVENOUS at 09:06

## 2019-02-11 RX ADMIN — IBUPROFEN 600 MG: 600 TABLET ORAL at 05:48

## 2019-02-11 RX ADMIN — MORPHINE SULFATE 4 MG: 4 INJECTION INTRAVENOUS at 09:06

## 2019-02-11 RX ADMIN — ENOXAPARIN SODIUM 40 MG: 40 INJECTION SUBCUTANEOUS at 09:06

## 2019-02-11 RX ADMIN — BACLOFEN 10 MG: 10 TABLET ORAL at 18:02

## 2019-02-11 RX ADMIN — LIDOCAINE 1 PATCH: 50 PATCH CUTANEOUS at 09:06

## 2019-02-11 NOTE — H&P
H&P- Vance Rodrigues 1990, 33 y o  female MRN: [id number]    Unit/Bed#: 2 217-03 Encounter: [id number]    Primary Care Provider: DAMION Medina   Date and time admitted to hospital: 2/10/2019  1:55 PM        * Back pain of lumbosacral region with sciatica  Assessment & Plan  Adult pain management  Lidocaine patch  Heating pad  MRI tomorrow  Patient was given prednisone 60 mg will transformed to dexamethasone 4 mg every 8 hours for now; neurosurgery consult  Lumbosacral radiculopathy  Assessment & Plan  Pain management as noted above  Ambulatory dysfunction  Assessment & Plan  Pain management as noted above  Will leave to the day team whether they would like to get occupational therapy consult depending on patient's progress  VTE Prophylaxis: Enoxaparin (Lovenox)  / sequential compression device   Code Status: Level 1 - Full Code as discussed with patient  POLST: There is no POLST form on file for this patient (pre-hospital)    Anticipated Length of Stay:  Patient will be admitted on an Observation basis with an anticipated length of stay of  less than 2 midnights  Justification for Hospital Stay: Please see detailed plans noted above  Chief Complaint:     Left-sided radicular back pain  History of Present Illness:  Vance Rodrigues is a 33 y o  female who has significant history for lumbar back pain; this has been going on for about a month now  She works as part of housekeeping  Over the past month she has been complaining of this left-sided back pain that radiates from the back going towards the buttocks and towards her for foot  When asked, the patient claims that it radiates and trace is going to small toe  Patient denies any incontinence  Tingling but no numbness    Because of the increasing back pain especially for the past week or so, patient went to the emergency room to be evaluated and showed to have bulging discs on L5-S1 level on the left side     Currently, patient is tearful  Otherwise, she is not in any cardiorespiratory distress  Review of Systems:    Constitutional:  Denies fever or chills   Eyes:  Denies change in visual acuity   HENT:  Denies nasal congestion or sore throat   Respiratory:  Denies cough or shortness of breath   Cardiovascular:  Denies chest pain or edema   GI:  Denies abdominal pain, nausea, vomiting, bloody stools or diarrhea   :  Denies dysuria   Musculoskeletal:  Back pain as noted above  Integument:  Denies rash   Neurologic:  Denies headache, focal weakness or sensory changes   Endocrine:  Denies polyuria or polydipsia   Lymphatic:  Denies swollen glands   Psychiatric:  Denies depression or anxiety     Past Medical and Surgical History:   Past Medical History:   Diagnosis Date    GERD (gastroesophageal reflux disease)     Obesity      Past Surgical History:   Procedure Laterality Date    HERNIA REPAIR      IA COLONOSCOPY FLX DX W/COLLJ SPEC WHEN PFRMD N/A 12/20/2018    Procedure: COLONOSCOPY;  Surgeon: Beronica Lombardi MD;  Location: [facility]; Service: Gastroenterology    IA ESOPHAGOGASTRODUODENOSCOPY TRANSORAL DIAGNOSTIC N/A 12/20/2018    Procedure: ESOPHAGOGASTRODUODENOSCOPY (EGD); Surgeon: Beronica Lombardi MD;  Location: [facility]; Service: Gastroenterology    TUBAL LIGATION         Meds/Allergies:  Medications Prior to Admission   Medication    CVS PAIN RELIEF EXTRA STRENGTH 500 MG tablet    ibuprofen (MOTRIN) 600 mg tablet    Na Sulfate-K Sulfate-Mg Sulf 17 5-3 13-1 6 GM/177ML SOLN    omeprazole (PriLOSEC) 20 mg delayed release capsule    phentermine (ADIPEX-P) 37 5 MG tablet       Allergies:    Allergies   Allergen Reactions    Septra [Sulfamethoxazole-Trimethoprim] Rash and Shortness Of Breath    Ciprofloxacin Hcl     Zosyn [Piperacillin Sod-Tazobactam So] Hives and Rash     History:  Marital Status: Significant Other   Occupation:  Housekeeping  Patient Pre-hospital Living Situation:  Lives at home with   Patient Pre-hospital Level of Mobility:  Ambulatory  Patient Pre-hospital Diet Restrictions:  Regular diet  Substance Use History:   Social History     Substance and Sexual Activity   Alcohol Use No     Social History     Tobacco Use   Smoking Status Never Smoker   Smokeless Tobacco Never Used     Social History     Substance and Sexual Activity   Drug Use No       Family History:  History reviewed  No pertinent family history  Physical Exam:     Vitals:   Blood Pressure: 134/78 (02/10/19 1358)  Pulse: 90 (02/10/19 1358)  Temperature: 98 1 °F (36 7 °C) (02/10/19 1358)  Respirations: 22 (02/10/19 1358)  Height: 4' 11" (149 9 cm) (02/10/19 1356)  Weight - Scale: 77 1 kg (170 lb) (02/10/19 1356)  SpO2: 98 % (02/10/19 1358)    Constitutional:  Well developed, well nourished, no acute distress, non-toxic appearance   Eyes:  PERRL, conjunctiva normal   HENT:  Atraumatic, external ears normal, nose normal, oropharynx moist, no pharyngeal exudates  Neck- normal range of motion, no tenderness, supple   Respiratory:  No respiratory distress, normal breath sounds, no rales, no wheezing   Cardiovascular:  Normal rate, normal rhythm, no murmurs, no gallops, no rubs   GI:  Soft, nondistended, normal bowel sounds, nontender, no organomegaly, no mass, no rebound, no guarding   :  No costovertebral angle tenderness   Musculoskeletal:  No edema, no tenderness, no deformities  Tenderness at the left side paraspinal area at the level of L5-S1; radiation towards the back of the leg buttock towards the left small toe  Straight leg testing positive  Integument:  Well hydrated, no rash   Lymphatic:  No lymphadenopathy noted   Neurologic:  Alert &awake, communicative, CN 2-12 normal, patient is able to move toes bilaterally  Psychiatric:  Speech and behavior appropriate       Lab Results: I have personally reviewed pertinent reports        Results from last 7 days   Lab Units 02/10/19  1656   WBC Thousand/uL 10 34*   HEMOGLOBIN g/dL 14 7   HEMATOCRIT % 44 8   PLATELETS Thousands/uL 208   NEUTROS PCT % 84*   LYMPHS PCT % 11*   MONOS PCT % 3*   EOS PCT % 1     Results from last 7 days   Lab Units 02/10/19  1656   POTASSIUM mmol/L 4 2   CHLORIDE mmol/L 105   CO2 mmol/L 28   BUN mg/dL 13   CREATININE mg/dL 0 83   CALCIUM mg/dL 8 9   ALK PHOS U/L 54   ALT U/L 29   AST U/L 21               Imaging: I have personally reviewed pertinent reports  Ct Abdomen Pelvis W Wo Contrast    Result Date: 1/20/2019  Narrative: CT ABDOMEN AND PELVIS WITH AND WITHOUT IV CONTRAST INDICATION:   R93 89: Abnormal findings on diagnostic imaging of other specified body structures N28 9: Disorder of kidney and ureter, unspecified  COMPARISON:  Abdominal ultrasound examinations performed November 5, 2018 and May 5, 2017  TECHNIQUE: Initial CT of the abdomen and pelvis was performed without intravenous contrast   Subsequent dynamic CT evaluation of the abdomen and pelvis was performed after the administration of intravenous contrast in both nephrographic and delayed phases after the administration of intravenous contrast   Axial, sagittal, and coronal 2D reformatted images were created from the source data and submitted for interpretation  Radiation dose length product (DLP) for this visit:  1748 04 mGy-cm   This examination, like all CT scans performed in the [facility], was performed utilizing techniques to minimize radiation dose exposure, including the use of iterative reconstruction and automated exposure control  IV Contrast:  100 mL of iohexol (OMNIPAQUE) Enteric Contrast:  Enteric contrast was not administered  FINDINGS: ABDOMEN RIGHT KIDNEY AND URETER: No solid renal mass  The finding at the upper pole the right kidney seen on prior ultrasound of November 5, 2018 and also ultrasound of May 5, 2017 is consistent with a prominent renal papilla, consistent with anatomic variation  No detectable urothelial mass   No hydronephrosis or hydroureter  No urinary tract calculi  No perinephric collection  LEFT KIDNEY AND URETER: No solid renal mass  No detectable urothelial mass  No hydronephrosis or hydroureter  No urinary tract calculi  No perinephric collection  URINARY BLADDER: No bladder wall mass  No calculi  LOWER CHEST:  No clinically significant abnormality identified in the visualized lower chest  LIVER/BILIARY TREE:  Liver is diffusely decreased in density consistent with fatty change  No CT evidence of suspicious hepatic mass  Normal hepatic contours  No biliary dilatation  GALLBLADDER:  No calcified gallstones  No pericholecystic inflammatory change  SPLEEN:  Unremarkable  PANCREAS:  Unremarkable  ADRENAL GLANDS:  Unremarkable  STOMACH AND BOWEL:  Unremarkable  ABDOMINOPELVIC CAVITY:  No ascites  No free intraperitoneal air  No lymphadenopathy  VESSELS:  Unremarkable for patient's age  PELVIS REPRODUCTIVE ORGANS:  Unremarkable for patient's age  APPENDIX: A normal appendix was visualized  ABDOMINAL WALL/INGUINAL REGIONS:  Unremarkable  OSSEOUS STRUCTURES:  No acute fracture or destructive osseous lesion  Impression: No suspicious renal mass  Abnormality the upper pole the right kidney on prior ultrasound examinations corresponds to a prominent renal papilla consistent with anatomic variation  Hepatic steatosis  Workstation performed: [id number]     Xr Spine Lumbar 2 Or 3 Views Injury    Result Date: 2/2/2019  Narrative: LUMBAR SPINE INDICATION:   fall, L spine tenderness  Slip and fall  Left-sided back pain  COMPARISON:  None VIEWS:  XR SPINE LUMBAR 2 OR 3 VIEWS INJURY Images: 2 FINDINGS: There is no evidence of acute fracture or destructive osseous lesion  Alignment is unremarkable  No significant lumbar degenerative change noted  The pedicles appear intact  Soft tissues are unremarkable  Impression: No acute fracture or traumatic malalignment   Workstation performed: [id number]     Xr Hip/pelv 2-3 Vws Right If Performed    Result Date: 2/2/2019  Narrative: RIGHT HIP INDICATION:   R hip pain after fall  Slip and fall  Low back pain  COMPARISON:  None VIEWS:  XR HIP/PELV 2-3 VWS RIGHT W PELVIS IF PERFORMED Images: 3 FINDINGS: There is no acute fracture or dislocation  No significant hip degenerative changes  No lytic or blastic osseous lesions  Soft tissues are unremarkable  The visualized lumbar spine is unremarkable  Impression: No acute osseous abnormality  Workstation performed: [id number]     Ct Abdomen Pelvis With Contrast    Result Date: 2/10/2019  Narrative: CT ABDOMEN AND PELVIS WITH IV CONTRAST INDICATION:   Right lower quadrant abdominal pain  COMPARISON:  None  TECHNIQUE:  CT examination of the abdomen and pelvis was performed  Axial, sagittal, and coronal 2D reformatted images were created from the source data and submitted for interpretation  Radiation dose length product (DLP) for this visit:  398 43 mGy-cm   This examination, like all CT scans performed in the [facility], was performed utilizing techniques to minimize radiation dose exposure, including the use of iterative  reconstruction and automated exposure control  IV Contrast:  100 mL of iohexol (OMNIPAQUE) Enteric Contrast:  Enteric contrast was not administered  FINDINGS: ABDOMEN LOWER CHEST:  Right basilar dependent thickening possibly from subsegmental atelectasis  LIVER/BILIARY TREE:  Liver is diffusely decreased in density consistent with fatty change  No CT evidence of suspicious hepatic mass  Normal hepatic contours  No biliary dilatation  GALLBLADDER:  No calcified gallstones  No pericholecystic inflammatory change  SPLEEN:  Unremarkable  PANCREAS:  Unremarkable  ADRENAL GLANDS:  Unremarkable  KIDNEYS/URETERS:  Unremarkable  No hydronephrosis  STOMACH AND BOWEL:  Unremarkable  APPENDIX:  No findings to suggest appendicitis  ABDOMINOPELVIC CAVITY:  No ascites or free intraperitoneal air  No lymphadenopathy  VESSELS:  Unremarkable for patient's age  PELVIS REPRODUCTIVE ORGANS:  Increased endometrial fluid and 3 cm left ovarian cyst, correlate with beta-hCG  URINARY BLADDER:  Unremarkable  ABDOMINAL WALL/INGUINAL REGIONS:  Unremarkable  OSSEOUS STRUCTURES:  Prominent disc bulge at L5-S1 causing severe spinal canal narrowing, correlate with lumbar spine MRI  Impression: Prominent disc bulge at L5-S1 causing severe spinal canal narrowing, correlate with lumbar spine MRI  Increased endometrial fluid and 3 cm left ovarian cyst, correlate with beta-hCG  If positive beta hCG, correlate with pelvic ultrasound  Workstation performed: [id number]         ** Please Note: Dragon 360 Dictation voice to text software was used in the creation of this document   **

## 2019-02-11 NOTE — ASSESSMENT & PLAN NOTE
Pain management as noted above  Will leave to the day team whether they would like to get occupational therapy consult depending on patient's progress

## 2019-02-11 NOTE — UTILIZATION REVIEW
Initial Clinical Review    Admission: Date/Time/Statement: 02/10/19 @ 2122 Observation Written   Orders Placed This Encounter   Procedures    Place in Observation (expected length of stay for this patient is less than two midnights)     Standing Status:   Standing     Number of Occurrences:   1     Order Specific Question:   Admitting Physician     Answer:   Tara Cohen [[id number]]     Order Specific Question:   Level of Care     Answer:   Med Surg [[id number]]     ED: Date/Time/Mode of Arrival:   ED Arrival Information     Expected Arrival Acuity Means of Arrival Escorted By Service Admission Type    - 2/10/2019 13:52 Less Urgent Ambulance [facility] General Medicine Urgent    Arrival Complaint    Back pain         Chief Complaint:   Chief Complaint   Patient presents with    Back Pain     pt with back pain x1 month  see here for same and given motrin  today while brushing teeth pain became worse  pt refusing to walk or move  pt crying in pain  pt states she has been taking motrin with no relief  denies injury  History of Illness: Patient is a 70-year-old primarily Japanese-speaking female here with  who is helping translate reports that she has had back pain for about a month now that became acutely worse this morning while she was brushing her teeth  She denies any injury or trauma to the area  She was seen here after a fall with low back pain and right hip contusion on February 1st and states she followed up with PCP who referred her for PT which she is supposed to start tomorrow morning  She reports pain in the middle of her low back that radiates down both buttocks and upper legs left side worse than right with some a tingling on the left  Pain is worse with position changes and better at rest   She denies fevers, chills, urinary symptoms, change in bowel or bladder function, saddle anesthesia      ED Vital Signs:   ED Triage Vitals   Temperature Pulse Respirations Blood Pressure SpO2 02/10/19 1358 02/10/19 1358 02/10/19 1358 02/10/19 1358 02/10/19 1358   98 1 °F (36 7 °C) 90 22 134/78 98 %      Temp Source Heart Rate Source Patient Position - Orthostatic VS BP Location FiO2 (%)   02/10/19 2300 -- 02/10/19 2300 02/10/19 2300 --   Oral  Lying Right arm       Pain Score       02/10/19 1358       9        Wt Readings from Last 1 Encounters:   02/10/19 77 1 kg (170 lb)     Vital Signs (abnormal): WNL  Pertinent Labs/Diagnostic Test Results: WBC 10 34, GLUC 143,   Blood, UA TraceAbnormal       WBC, UA 2-4Abnormal     Epithelial Cells ModerateAbnormal       CT AP:  Prominent disc bulge at L5-S1 causing severe spinal canal narrowing, correlate with lumbar spine MRI  Increased endometrial fluid and 3 cm left ovarian cyst, correlate with beta-hCG  If positive beta hCG, correlate with pelvic ultrasound  ED Treatment:   Medication Administration from 02/10/2019 1352 to 02/10/2019 2209       Date/Time Order Dose Route Action     02/10/2019 1457 ketorolac (TORADOL) injection 30 mg 30 mg Intramuscular Given     02/10/2019 1457 predniSONE tablet 60 mg 60 mg Oral Given     02/10/2019 1457 methocarbamol (ROBAXIN) tablet 500 mg 500 mg Oral Given     02/10/2019 1458 lidocaine (LIDODERM) 5 % patch 1 patch 1 patch Topical Medication Applied     02/10/2019 1657 sodium chloride 0 9 % bolus 1,000 mL 1,000 mL Intravenous New Bag     02/10/2019 1658 morphine (PF) 4 mg/mL injection 4 mg 4 mg Intravenous Given     02/10/2019 1855 iohexol (OMNIPAQUE) 350 MG/ML injection (MULTI-DOSE) 100 mL 100 mL Intravenous Given        Past Medical/Surgical History:    Active Ambulatory Problems     Diagnosis Date Noted    KELLY (obstructive sleep apnea)     Periodic limb movements of sleep     Diarrhea 11/20/2018    Diffuse abdominal pain 11/20/2018    Irritable bowel syndrome with diarrhea 11/20/2018     Resolved Ambulatory Problems     Diagnosis Date Noted    No Resolved Ambulatory Problems     Past Medical History:   Diagnosis Date    GERD (gastroesophageal reflux disease)     Obesity      Admitting Diagnosis: Lumbosacral radiculopathy [M54 17]  Back pain [M54 9]  Spinal cord compression (HCC) [G95 20]  Abdominal pain [R10 9]  Herniation of intervertebral disc between L5 and S1 [M51 27]  Age/Sex: 33 y o  female  Assessment/Plan:  Back pain of lumbosacral region with sciatica  Assessment & Plan  Adult pain management  Lidocaine patch  Heating pad  MRI tomorrow  Patient was given prednisone 60 mg will transformed to dexamethasone 4 mg every 8 hours for now; neurosurgery consult  Lumbosacral radiculopathy  Assessment & Plan  Pain management as noted above  Ambulatory dysfunction  Assessment & Plan  Pain management as noted above  Will leave to the day team whether they would like to get occupational therapy consult depending on patient's progress  VTE Prophylaxis: Enoxaparin (Lovenox)  / sequential compression device   Anticipated Length of Stay:  Patient will be admitted on an Observation basis with an anticipated length of stay of  less than 2 midnights  Justification for Hospital Stay: Please see detailed plans noted above      Admission Orders:  Cardiac diet  OOB with assist  [place]  MRI Lspine  Consult neurosurgery  Sequential compression device   Scheduled Meds:   Current Facility-Administered Medications:  baclofen 10 mg Oral BID   dexamethasone 4 mg Intravenous Q8H [address]   docusate sodium 100 mg Oral BID PRN   enoxaparin 40 mg Subcutaneous Daily   ibuprofen 600 mg Oral Q8H PRN   lidocaine 1 patch Topical Daily   morphine injection 4 mg Intravenous Q4H PRN   ondansetron 4 mg Intravenous Q6H PRN   pantoprazole 40 mg Oral Early Morning     Continuous Infusions:    PRN Meds: docusate sodium    ibuprofen    morphine injection    ondansetron

## 2019-02-11 NOTE — CONSULTS
Consultation - Neurosurgery   Max Liu 33 y o  female MRN: [id number]  Unit/Bed#: CW2 217-03 Encounter: [id number]  Consult completed on 2/11/2019 at 1500    Consults    Assessment/Plan     Assessment:  1  Disc herniation at L5-S1  2  Back pain in lumbosacral region with radiculopathy  3  Ambulatory dysfunction    Plan:  · Exam:  Alert and oriented x3, moving all extremities with weakness noted in proximal LUE secondary to pain inhibition, sensation intact, JPS 3/3 at bilateral toes and fingers, DST intact, midline lumbar tenderness to palpation  · Imaging reviewed personally and with attending  Results are as follows:  · MRI lumbar spine without contrast 02/11/2019:  Official read pending, significant disc extrusion at L5-S1 spinal cord narrowing  · CT abdomen pelvis with contrast 02/10/2019:  Prominent disc bulge at L5-S1 causing severe spinal canal narrowing  · Medical management per primary team  · DVT ppx:  SCDs and Lovenox  · Mobilize as tolerated with assistance  · Discussed imaging results with patient and family  Discussed option of surgical intervention versus conservative management - patient will likely need surgery at some point as it is unlikely that disc will resolve itself  Patient would like to discuss with mother before making a decision    · Neurosurgery will continue to follow in the setting of back pain with radiculopathy and L5-S1 disc bulge  · Patient may opt for surgical intervention on this admission, would tentatively go Wednesday if agreeable and OR time is available  · If surgery is not done on this admission, it is reasonable for patient to follow-up in the outpatient setting and continue trying conservative management before eventually having surgery  · Please call with questions or concerns    History of Present Illness   HPI: Max Liu is a 33 y o  female without significant past medical history who presents with complaint of back pain and left leg radiculopathy for 1 month  Imaging revealed significant disc bulge at L5-S1 causing severe spinal canal narrowing  Patient states she has had back and bilateral leg pain for years but over the past month she has had constant and progressively worsening low back and left leg pain  Pain radiates from low back into left buttock and posterior left leg to the knee but will go down to her lateral ankle at times  She has associated tingling sensation on the left lateral ankle  Patient admits to falling 2 weeks ago and states that made her back and left leg pain even worse  Sitting for prolonged periods of time makes pain worse and lying down makes pain better  Patient was taking Motrin at home but states she stopped 2-3 weeks ago when she found out her "liver was not functioning properly "  Patient also admits to having the flu 2 weeks ago and states coughing creates a pulling sensation in her back  She is finding it difficult to walk as it is painful  She denies bowel or bladder issues, numbness/weakness  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 moving, all of which aggravates her pain  Patient has tried physical therapy two years ago and states she had relief with this  She also has relief with Motrin at home but states that she stopped taking it due to liver problems  Review of Systems   Constitutional: Positive for activity change  Negative for chills and fatigue  HENT: Negative for hearing loss  Eyes: Negative for visual disturbance  Respiratory: Negative for chest tightness and shortness of breath  Cardiovascular: Negative for chest pain  Gastrointestinal: Positive for constipation  Negative for abdominal pain, diarrhea, nausea and vomiting  Genitourinary: Negative for difficulty urinating  Musculoskeletal: Positive for back pain (Low back)  Negative for neck pain     Allergic/Immunologic: Negative for environmental allergies and food allergies  Neurological: Negative for dizziness, numbness and headaches  Tingling in left lateral ankle   Hematological: Bruises/bleeds easily  Psychiatric/Behavioral: Negative for confusion  Historical Information   Past Medical History:   Diagnosis Date    GERD (gastroesophageal reflux disease)     Obesity      Past Surgical History:   Procedure Laterality Date    HERNIA REPAIR      CA COLONOSCOPY FLX DX W/COLLJ SPEC WHEN PFRMD N/A 12/20/2018    Procedure: COLONOSCOPY;  Surgeon: Lulu Gracia MD;  Location: [facility]; Service: Gastroenterology    CA ESOPHAGOGASTRODUODENOSCOPY TRANSORAL DIAGNOSTIC N/A 12/20/2018    Procedure: ESOPHAGOGASTRODUODENOSCOPY (EGD); Surgeon: Lulu Gracia MD;  Location: [facility]; Service: Gastroenterology    TUBAL LIGATION       Social History     Substance and Sexual Activity   Alcohol Use No     Social History     Substance and Sexual Activity   Drug Use No     Social History     Tobacco Use   Smoking Status Never Smoker   Smokeless Tobacco Never Used     History reviewed  No pertinent family history  Meds/Allergies   all current active meds have been reviewed, current meds:   Current Facility-Administered Medications   Medication Dose Route Frequency    baclofen tablet 10 mg  10 mg Oral BID    dexamethasone (DECADRON) injection 4 mg  4 mg Intravenous Q8H [facility]    docusate sodium (COLACE) capsule 100 mg  100 mg Oral BID PRN    enoxaparin (LOVENOX) subcutaneous injection 40 mg  40 mg Subcutaneous Daily    ibuprofen (MOTRIN) tablet 600 mg  600 mg Oral Q8H PRN    lidocaine (LIDODERM) 5 % patch 1 patch  1 patch Topical Daily    morphine (PF) 4 mg/mL injection 4 mg  4 mg Intravenous Q4H PRN    ondansetron (ZOFRAN) injection 4 mg  4 mg Intravenous Q6H PRN    pantoprazole (PROTONIX) EC tablet 40 mg  40 mg Oral Early Morning    and PTA meds:   Prior to Admission Medications   Prescriptions Last Dose Informant Patient Reported? Taking?    [employer] PAIN RELIEF EXTRA STRENGTH 500 MG tablet  Self Yes Yes   omeprazole (PriLOSEC) 20 mg delayed release capsule  Self Yes Yes   Sig: Take 1 tablet by mouth daily      Facility-Administered Medications: None     Allergies   Allergen Reactions    Septra [Sulfamethoxazole-Trimethoprim] Rash and Shortness Of Breath    Ciprofloxacin Hcl     Zosyn [Piperacillin Sod-Tazobactam So] Hives and Rash       Objective   I/O       02/09 0701 - 02/10 0700 02/10 0701 - 02/11 0700 02/11 0701 - 02/12 0700    P  O    326    IV Piggyback  1000     Total Intake(mL/kg)  1000 (13) 326 (4 2)    Net  +1000 +326                 Vitals:Blood pressure 118/73, pulse 85, temperature 98 5 °F (36 9 °C), temperature source Oral, resp  rate 18, height 4' 11" (1 499 m), weight 77 1 kg (170 lb), last menstrual period 01/10/2019, SpO2 97 %  ,Body mass index is 34 34 kg/m²  Physical Exam   Constitutional: She is oriented to person, place, and time  Vital signs are normal  She appears well-developed and well-nourished  She is cooperative  HENT:   Head: Normocephalic and atraumatic  Eyes: Pupils are equal, round, and reactive to light  Conjunctivae and EOM are normal    Neck: Normal range of motion  No spinous process tenderness and no muscular tenderness present  Cardiovascular: Normal rate  Pulmonary/Chest: Effort normal  No respiratory distress  Musculoskeletal:        Cervical back: She exhibits no tenderness  Thoracic back: She exhibits no tenderness  Lumbar back: She exhibits tenderness and pain  Neurological: She is alert and oriented to person, place, and time  She has a normal Finger-Nose-Finger Test    Reflex Scores:       Bicep reflexes are 2+ on the right side and 2+ on the left side  Brachioradialis reflexes are 2+ on the right side and 2+ on the left side  Patellar reflexes are 2+ on the right side and 2+ on the left side  Achilles reflexes are 2+ on the right side and 2+ on the left side    Skin: Skin is warm, dry and intact  Psychiatric: She has a normal mood and affect  Her speech is normal and behavior is normal  Judgment normal  Cognition and memory are normal      Neurologic Exam     Mental Status   Oriented to person, place, and time  Registration: recalls 3 of 3 objects  Attention: normal  Concentration: normal    Speech: speech is normal   Level of consciousness: alert  Knowledge: good  Able to perform simple calculations  Able to name object  Able to repeat  Normal comprehension  Cranial Nerves     CN II   Visual fields full to confrontation  CN III, IV, VI   Pupils are equal, round, and reactive to light  Extraocular motions are normal    CN VI: no CN VI palsy  Nystagmus: none   Diplopia: none  Ophthalmoparesis: none  Upgaze: normal  Downgaze: normal  Conjugate gaze: present    CN V   Right facial sensation deficit: none  Left facial sensation deficit: none    CN VII   Right facial weakness: none  Left facial weakness: none    CN VIII   Hearing: intact    CN IX, X   CN IX normal    CN X normal      CN XI   Right trapezius strength: normal  Left trapezius strength: normal    CN XII   CN XII normal      Motor Exam   Muscle bulk: normal  Overall muscle tone: normal  Right arm pronator drift: absent  Left arm pronator drift: absent    Strength   Strength 5/5 except as noted     LUE:  3/5 HF, 4/5 HE secondary to pain  +left straight leg raise     Sensory Exam   Light touch normal    Proprioception normal    Right leg pinprick: normal  Left leg pinprick: normal  DST intact     Gait, Coordination, and Reflexes     Coordination   Finger to nose coordination: normal    Tremor   Resting tremor: absent  Intention tremor: absent  Action tremor: absent    Reflexes   Right brachioradialis: 2+  Left brachioradialis: 2+  Right biceps: 2+  Left biceps: 2+  Right patellar: 2+  Left patellar: 2+  Right achilles: 2+  Left achilles: 2+  Right : 2+  Left : 2+  Right Queen: absent  Left Queen: absent  Right ankle clonus: absent  Left ankle clonus: absent      Lab Results:   Results from last 7 days   Lab Units 02/11/19  0548 02/10/19  1656   WBC Thousand/uL 17 32* 10 34*   HEMOGLOBIN g/dL 13 5 14 7   HEMATOCRIT % 41 0 44 8   PLATELETS Thousands/uL 213 208   NEUTROS PCT % 79* 84*   MONOS PCT % 7 3*     Results from last 7 days   Lab Units 02/11/19  0548 02/10/19  1656   POTASSIUM mmol/L 3 8 4 2   CHLORIDE mmol/L 108 105   CO2 mmol/L 26 28   BUN mg/dL 12 13   CREATININE mg/dL 0 72 0 83   CALCIUM mg/dL 8 7 8 9   ALK PHOS U/L  --  54   ALT U/L  --  29   AST U/L  --  21     Results from last 7 days   Lab Units 02/11/19  0548   MAGNESIUM mg/dL 2 2     Results from last 7 days   Lab Units 02/11/19  0548   PHOSPHORUS mg/dL 2 6*     Results from last 7 days   Lab Units 02/11/19  0023   INR  1 02   PTT seconds 25*       Imaging Studies: I have personally reviewed pertinent reports  and I have personally reviewed pertinent films in PACS    Ct Abdomen Pelvis With Contrast    Result Date: 2/10/2019  Impression: Prominent disc bulge at L5-S1 causing severe spinal canal narrowing, correlate with lumbar spine MRI  Increased endometrial fluid and 3 cm left ovarian cyst, correlate with beta-hCG  If positive beta hCG, correlate with pelvic ultrasound  Workstation performed: [id number]     EKG, Pathology, and Other Studies: I have personally reviewed pertinent reports  VTE Prophylaxis: Sequential compression device (Venodyne)  and Enoxaparin (Lovenox)    Code Status: Level 1 - Full Code  Advance Directive and Living Will:      Power of :    POLST:      Counseling / Coordination of Care  I spent 30 minutes with the patient

## 2019-02-11 NOTE — ASSESSMENT & PLAN NOTE
Adult pain management  Lidocaine patch  Heating pad  MRI tomorrow  Patient was given prednisone 60 mg will transformed to dexamethasone 4 mg every 8 hours for now; neurosurgery consult

## 2019-02-11 NOTE — PLAN OF CARE
Problem: Potential for Falls  Goal: Patient will remain free of falls  Description  INTERVENTIONS:  - Assess patient frequently for physical needs  -  Identify cognitive and physical deficits and behaviors that affect risk of falls  -  [place] fall precautions as indicated by assessment   - Educate patient/family on patient safety including physical limitations  - Instruct patient to call for assistance with activity based on assessment  - Modify environment to reduce risk of injury  - Consider OT/PT consult to assist with strengthening/mobility  Outcome: Progressing     Problem: NEUROSENSORY - ADULT  Goal: Achieves stable or improved neurological status  Description  INTERVENTIONS  - Monitor and report changes in neurological status  - Initiate measures to prevent increased intracranial pressure  - Maintain blood pressure and fluid volume within ordered parameters to optimize cerebral perfusion  - Monitor temperature, glucose, and sodium or any other associated labs   Initiate appropriate interventions as ordered  - Monitor for seizure activity   - Administer anti-seizure medications as ordered  Outcome: Progressing  Goal: Achieves maximal functionality and self care  Description  INTERVENTIONS  - Monitor swallowing and airway patency with patient fatigue and changes in neurological status  - Encourage and assist patient to increase activity and self care with guidance from rehab services  - Encourage visually impaired, hearing impaired and aphasic patients to use assistive/communication devices  Outcome: Progressing     Problem: PAIN - ADULT  Goal: Verbalizes/displays adequate comfort level or baseline comfort level  Description  Interventions:  - Encourage patient to monitor pain and request assistance  - Assess pain using appropriate pain scale  - Administer analgesics based on type and severity of pain and evaluate response  - Implement non-pharmacological measures as appropriate and evaluate response  - Consider cultural and social influences on pain and pain management  - Notify physician/advanced practitioner if interventions unsuccessful or patient reports new pain  Outcome: Progressing     Problem: INFECTION - ADULT  Goal: Absence or prevention of progression during hospitalization  Description  INTERVENTIONS:  - Assess and monitor for signs and symptoms of infection  - Monitor lab/diagnostic results  - Monitor all insertion sites, i e  indwelling lines, tubes, and drains  - Monitor endotracheal (as able) and nasal secretions for changes in amount and color  - [place] appropriate cooling/warming therapies per order  - Administer medications as ordered  - Instruct and encourage patient and family to use good hand hygiene technique  - Identify and instruct in appropriate isolation precautions for identified infection/condition  Outcome: Progressing     Problem: SAFETY ADULT  Goal: Maintain or return to baseline ADL function  Description  INTERVENTIONS:  -  Assess patient's ability to carry out ADLs; assess patient's baseline for ADL function and identify physical deficits which impact ability to perform ADLs (bathing, care of mouth/teeth, toileting, grooming, dressing, etc )  - Assess/evaluate cause of self-care deficits   - Assess range of motion  - Assess patient's mobility; develop plan if impaired  - Assess patient's need for assistive devices and provide as appropriate  - Encourage maximum independence but intervene and supervise when necessary  ¯ Involve family in performance of ADLs  ¯ Assess for home care needs following discharge   ¯ Request OT consult to assist with ADL evaluation and planning for discharge  ¯ Provide patient education as appropriate  Outcome: Progressing  Goal: Maintain or return mobility status to optimal level  Description  INTERVENTIONS:  - Assess patient's baseline mobility status (ambulation, transfers, stairs, etc )    - Identify cognitive and physical deficits and behaviors that affect mobility  - Identify mobility aids required to assist with transfers and/or ambulation (gait belt, sit-to-stand, lift, walker, cane, etc )  - [place] fall precautions as indicated by assessment  - Record patient progress and toleration of activity level on Mobility SBAR; progress patient to next Phase/Stage  - Instruct patient to call for assistance with activity based on assessment  - Request Rehabilitation consult to assist with strengthening/weightbearing, etc   Outcome: Progressing     Problem: DISCHARGE PLANNING  Goal: Discharge to home or other facility with appropriate resources  Description  INTERVENTIONS:  - Identify barriers to discharge w/patient and caregiver  - Arrange for needed discharge resources and transportation as appropriate  - Identify discharge learning needs (meds, wound care, etc )  - Arrange for interpretive services to assist at discharge as needed  - Refer to Case Management Department for coordinating discharge planning if the patient needs post-hospital services based on physician/advanced practitioner order or complex needs related to functional status, cognitive ability, or social support system  Outcome: Progressing     Problem: Knowledge Deficit  Goal: Patient/family/caregiver demonstrates understanding of disease process, treatment plan, medications, and discharge instructions  Description  Complete learning assessment and assess knowledge base    Interventions:  - Provide teaching at level of understanding  - Provide teaching via preferred learning methods  Outcome: Progressing

## 2019-02-11 NOTE — PLAN OF CARE
Problem: Potential for Falls  Goal: Patient will remain free of falls  Description  INTERVENTIONS:  - Assess patient frequently for physical needs  -  Identify cognitive and physical deficits and behaviors that affect risk of falls  -  [place] fall precautions as indicated by assessment   - Educate patient/family on patient safety including physical limitations  - Instruct patient to call for assistance with activity based on assessment  - Modify environment to reduce risk of injury  - Consider OT/PT consult to assist with strengthening/mobility  Outcome: Progressing     Problem: NEUROSENSORY - ADULT  Goal: Achieves stable or improved neurological status  Description  INTERVENTIONS  - Monitor and report changes in neurological status  - Initiate measures to prevent increased intracranial pressure  - Maintain blood pressure and fluid volume within ordered parameters to optimize cerebral perfusion  - Monitor temperature, glucose, and sodium or any other associated labs   Initiate appropriate interventions as ordered  - Monitor for seizure activity   - Administer anti-seizure medications as ordered  Outcome: Progressing  Goal: Achieves maximal functionality and self care  Description  INTERVENTIONS  - Monitor swallowing and airway patency with patient fatigue and changes in neurological status  - Encourage and assist patient to increase activity and self care with guidance from rehab services  - Encourage visually impaired, hearing impaired and aphasic patients to use assistive/communication devices  Outcome: Progressing     Problem: PAIN - ADULT  Goal: Verbalizes/displays adequate comfort level or baseline comfort level  Description  Interventions:  - Encourage patient to monitor pain and request assistance  - Assess pain using appropriate pain scale  - Administer analgesics based on type and severity of pain and evaluate response  - Implement non-pharmacological measures as appropriate and evaluate response  - Consider cultural and social influences on pain and pain management  - Notify physician/advanced practitioner if interventions unsuccessful or patient reports new pain  Outcome: Progressing     Problem: INFECTION - ADULT  Goal: Absence or prevention of progression during hospitalization  Description  INTERVENTIONS:  - Assess and monitor for signs and symptoms of infection  - Monitor lab/diagnostic results  - Monitor all insertion sites, i e  indwelling lines, tubes, and drains  - Monitor endotracheal (as able) and nasal secretions for changes in amount and color  - [place] appropriate cooling/warming therapies per order  - Administer medications as ordered  - Instruct and encourage patient and family to use good hand hygiene technique  - Identify and instruct in appropriate isolation precautions for identified infection/condition  Outcome: Progressing     Problem: SAFETY ADULT  Goal: Maintain or return to baseline ADL function  Description  INTERVENTIONS:  -  Assess patient's ability to carry out ADLs; assess patient's baseline for ADL function and identify physical deficits which impact ability to perform ADLs (bathing, care of mouth/teeth, toileting, grooming, dressing, etc )  - Assess/evaluate cause of self-care deficits   - Assess range of motion  - Assess patient's mobility; develop plan if impaired  - Assess patient's need for assistive devices and provide as appropriate  - Encourage maximum independence but intervene and supervise when necessary  ¯ Involve family in performance of ADLs  ¯ Assess for home care needs following discharge   ¯ Request OT consult to assist with ADL evaluation and planning for discharge  ¯ Provide patient education as appropriate  Outcome: Progressing  Goal: Maintain or return mobility status to optimal level  Description  INTERVENTIONS:  - Assess patient's baseline mobility status (ambulation, transfers, stairs, etc )    - Identify cognitive and physical deficits and behaviors that affect mobility  - Identify mobility aids required to assist with transfers and/or ambulation (gait belt, sit-to-stand, lift, walker, cane, etc )  - [place] fall precautions as indicated by assessment  - Record patient progress and toleration of activity level on Mobility SBAR; progress patient to next Phase/Stage  - Instruct patient to call for assistance with activity based on assessment  - Request Rehabilitation consult to assist with strengthening/weightbearing, etc   Outcome: Progressing     Problem: DISCHARGE PLANNING  Goal: Discharge to home or other facility with appropriate resources  Description  INTERVENTIONS:  - Identify barriers to discharge w/patient and caregiver  - Arrange for needed discharge resources and transportation as appropriate  - Identify discharge learning needs (meds, wound care, etc )  - Arrange for interpretive services to assist at discharge as needed  - Refer to Case Management Department for coordinating discharge planning if the patient needs post-hospital services based on physician/advanced practitioner order or complex needs related to functional status, cognitive ability, or social support system  Outcome: Progressing     Problem: Knowledge Deficit  Goal: Patient/family/caregiver demonstrates understanding of disease process, treatment plan, medications, and discharge instructions  Description  Complete learning assessment and assess knowledge base    Interventions:  - Provide teaching at level of understanding  - Provide teaching via preferred learning methods  Outcome: Progressing

## 2019-02-12 PROBLEM — M51.27 HERNIATION OF INTERVERTEBRAL DISC BETWEEN L5 AND S1: Status: ACTIVE | Noted: 2019-02-10

## 2019-02-12 PROBLEM — N85.9 FLUID IN ENDOMETRIAL CAVITY: Status: ACTIVE | Noted: 2019-02-12

## 2019-02-12 PROCEDURE — 99225 PR SBSQ OBSERVATION CARE/DAY 25 MINUTES: CPT | Performed by: PHYSICIAN ASSISTANT

## 2019-02-12 RX ORDER — LIDOCAINE 50 MG/G
1 PATCH TOPICAL DAILY
Status: DISCONTINUED | OUTPATIENT
Start: 2019-02-12 | End: 2019-02-16 | Stop reason: HOSPADM

## 2019-02-12 RX ORDER — METHYLPREDNISOLONE 16 MG/1
16 TABLET ORAL DAILY
Status: COMPLETED | OUTPATIENT
Start: 2019-02-14 | End: 2019-02-14

## 2019-02-12 RX ORDER — VANCOMYCIN HYDROCHLORIDE 1 G/200ML
1000 INJECTION, SOLUTION INTRAVENOUS ONCE
Status: DISCONTINUED | OUTPATIENT
Start: 2019-02-12 | End: 2019-02-12

## 2019-02-12 RX ORDER — SODIUM CHLORIDE 9 MG/ML
125 INJECTION, SOLUTION INTRAVENOUS CONTINUOUS
Status: DISCONTINUED | OUTPATIENT
Start: 2019-02-12 | End: 2019-02-12

## 2019-02-12 RX ORDER — OXYCODONE HYDROCHLORIDE 5 MG/1
5 TABLET ORAL EVERY 4 HOURS PRN
Status: DISCONTINUED | OUTPATIENT
Start: 2019-02-12 | End: 2019-02-16 | Stop reason: HOSPADM

## 2019-02-12 RX ORDER — ACETAMINOPHEN 325 MG/1
975 TABLET ORAL EVERY 8 HOURS SCHEDULED
Status: DISCONTINUED | OUTPATIENT
Start: 2019-02-12 | End: 2019-02-16 | Stop reason: HOSPADM

## 2019-02-12 RX ORDER — SODIUM CHLORIDE 9 MG/ML
125 INJECTION, SOLUTION INTRAVENOUS CONTINUOUS
Status: DISCONTINUED | OUTPATIENT
Start: 2019-02-13 | End: 2019-02-12

## 2019-02-12 RX ORDER — VANCOMYCIN HYDROCHLORIDE 1 G/200ML
1000 INJECTION, SOLUTION INTRAVENOUS ONCE
Status: COMPLETED | OUTPATIENT
Start: 2019-02-13 | End: 2019-02-13

## 2019-02-12 RX ORDER — METHYLPREDNISOLONE 4 MG/1
12 TABLET ORAL DAILY
Status: COMPLETED | OUTPATIENT
Start: 2019-02-15 | End: 2019-02-15

## 2019-02-12 RX ORDER — BENZONATATE 100 MG/1
100 CAPSULE ORAL 3 TIMES DAILY PRN
Status: DISCONTINUED | OUTPATIENT
Start: 2019-02-12 | End: 2019-02-16 | Stop reason: HOSPADM

## 2019-02-12 RX ORDER — HYDROMORPHONE HCL/PF 1 MG/ML
0.5 SYRINGE (ML) INJECTION EVERY 4 HOURS PRN
Status: DISCONTINUED | OUTPATIENT
Start: 2019-02-12 | End: 2019-02-15

## 2019-02-12 RX ORDER — BACLOFEN 10 MG/1
10 TABLET ORAL 2 TIMES DAILY PRN
Status: DISCONTINUED | OUTPATIENT
Start: 2019-02-12 | End: 2019-02-16 | Stop reason: HOSPADM

## 2019-02-12 RX ORDER — GABAPENTIN 100 MG/1
100 CAPSULE ORAL 3 TIMES DAILY
Status: DISCONTINUED | OUTPATIENT
Start: 2019-02-12 | End: 2019-02-16 | Stop reason: HOSPADM

## 2019-02-12 RX ORDER — SODIUM CHLORIDE 9 MG/ML
125 INJECTION, SOLUTION INTRAVENOUS CONTINUOUS
Status: DISCONTINUED | OUTPATIENT
Start: 2019-02-13 | End: 2019-02-15

## 2019-02-12 RX ORDER — CHLORHEXIDINE GLUCONATE 0.12 MG/ML
15 RINSE ORAL ONCE
Status: COMPLETED | OUTPATIENT
Start: 2019-02-12 | End: 2019-02-12

## 2019-02-12 RX ORDER — METHYLPREDNISOLONE 4 MG/1
4 TABLET ORAL DAILY
Status: DISCONTINUED | OUTPATIENT
Start: 2019-02-17 | End: 2019-02-16 | Stop reason: HOSPADM

## 2019-02-12 RX ORDER — OXYCODONE HYDROCHLORIDE 10 MG/1
10 TABLET ORAL EVERY 4 HOURS PRN
Status: DISCONTINUED | OUTPATIENT
Start: 2019-02-12 | End: 2019-02-16 | Stop reason: HOSPADM

## 2019-02-12 RX ORDER — METHYLPREDNISOLONE 4 MG/1
8 TABLET ORAL DAILY
Status: COMPLETED | OUTPATIENT
Start: 2019-02-16 | End: 2019-02-16

## 2019-02-12 RX ORDER — CHLORHEXIDINE GLUCONATE 0.12 MG/ML
15 RINSE ORAL ONCE
Status: DISCONTINUED | OUTPATIENT
Start: 2019-02-12 | End: 2019-02-12

## 2019-02-12 RX ADMIN — PANTOPRAZOLE SODIUM 40 MG: 40 TABLET, DELAYED RELEASE ORAL at 06:56

## 2019-02-12 RX ADMIN — GABAPENTIN 100 MG: 100 CAPSULE ORAL at 10:51

## 2019-02-12 RX ADMIN — IBUPROFEN 600 MG: 600 TABLET ORAL at 08:59

## 2019-02-12 RX ADMIN — SODIUM CHLORIDE 125 ML/HR: 0.9 INJECTION, SOLUTION INTRAVENOUS at 23:12

## 2019-02-12 RX ADMIN — BENZONATATE 100 MG: 100 CAPSULE ORAL at 23:12

## 2019-02-12 RX ADMIN — METHYLPREDNISOLONE 24 MG: 16 TABLET ORAL at 10:53

## 2019-02-12 RX ADMIN — GABAPENTIN 100 MG: 100 CAPSULE ORAL at 16:06

## 2019-02-12 RX ADMIN — OXYCODONE HYDROCHLORIDE 10 MG: 10 TABLET ORAL at 10:50

## 2019-02-12 RX ADMIN — CHLORHEXIDINE GLUCONATE 15 ML: 1.2 RINSE ORAL at 21:28

## 2019-02-12 RX ADMIN — ACETAMINOPHEN 975 MG: 325 TABLET, FILM COATED ORAL at 21:28

## 2019-02-12 RX ADMIN — BACLOFEN 10 MG: 10 TABLET ORAL at 08:59

## 2019-02-12 RX ADMIN — ENOXAPARIN SODIUM 40 MG: 40 INJECTION SUBCUTANEOUS at 08:59

## 2019-02-12 RX ADMIN — BENZONATATE 100 MG: 100 CAPSULE ORAL at 10:50

## 2019-02-12 RX ADMIN — GABAPENTIN 100 MG: 100 CAPSULE ORAL at 21:28

## 2019-02-12 RX ADMIN — DEXAMETHASONE SODIUM PHOSPHATE 4 MG: 4 INJECTION, SOLUTION INTRAMUSCULAR; INTRAVENOUS at 06:56

## 2019-02-12 NOTE — PROGRESS NOTES
Progress Note - Jasson Garcia 1990, 33 y o  female MRN: [id number]    Unit/Bed#: 2 217-03 Encounter: [id number]    Primary Care Provider: DAMION Lawson   Date and time admitted to hospital: 2/10/2019  1:55 PM        * Back pain of lumbosacral region with sciatica  Assessment & Plan  Adult pain management  Lidocaine patch  Heating pad  CT abdomen and pelvis:   Prominent disc bulge at L5-S1 causing severe spinal canal narrowing, correlate with lumbar spine MRI  Increased endometrial fluid and 3 cm left ovarian cyst, correlate with beta-hCG  If positive beta hCG, correlate with pelvic ultrasound  HCG obtained and negative     Appreciate neurosx discussed with later as pt was pending MRI   MRI this afternoon demonstrates:    1   Disc bulge with superimposed central/left paracentral disc extrusion at level L5-S1 with 8 mm cephalad migration and abutment of descending S1 nerve roots, left greater than right   It causes mild canal narrowing at this level  2   Partially imaged left ovarian cyst with internal septa versus 2 adjacent cysts        Patient was given prednisone 60 mg will transformed to dexamethasone 4 mg every 8 hours for now; neurosurgery consult appreciated     Discussed with neurosx they are waiting on pts decision   After I spoke with the patient the patient states she has spoken with her mother and she is agreeable to surgery which per neurosx will be later this coming weak (about Thursday)     Lumbosacral radiculopathy  Assessment & Plan  Pain management as noted above  Ambulatory dysfunction  Assessment & Plan  Pain management as noted above  At this time plan for probable surgery as pt is agreeable she has discussed with her mother   Will need work note 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 [employer] but she would need to give them a note to be out of work   I stated we would know better tomorrow as definitive decision on surgery will be made tomorrow by neurosx         VTE Pharmacologic Prophylaxis:   Pharmacologic: Enoxaparin (Lovenox)  Mechanical VTE Prophylaxis in Place: Yes    Patient Centered Rounds: I have performed bedside rounds with nursing staff today  Discussions with Specialists or Other Care Team Provider: nursing     Education and Discussions with Family / Patient: patient     Time Spent for Care: 45 minutes  More than 50% of total time spent on counseling and coordination of care as described above  Current Length of Stay: 0 day(s)    Current Patient Status: Observation   Certification Statement: The patient, admitted on an observation basis, will now require > 2 midnight hospital stay due to due to plan for surgery on or about thursday per neurosx     Discharge Plan: will depend on date of surgery and pt eval post procedure     Code Status: Level 1 - Full Code      Subjective:   Long discussion had with pt in regards to plan of care she reports bad pain in her back and 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 she doesn't know how she would manage especially with the current pain   She reports occasional but not right now tingling in her left ankle on occasion  She states she just had the flu today would be 3 weeks post the "flu" she was never placed on tamiflu  She has a deep cough slightly moist but doesn't report any production or  Sob  Objective:     Vitals:   Temp (24hrs), Av 6 °F (37 °C), Min:98 5 °F (36 9 °C), Max:98 7 °F (37 1 °C)    Temp:  [98 5 °F (36 9 °C)-98 7 °F (37 1 °C)] 98 7 °F (37 1 °C)  HR:  [85-98] 98  Resp:  [16-18] 16  BP: (118-139)/(64-73) 139/64  SpO2:  [95 %-97 %] 96 %  Body mass index is 34 34 kg/m²  Input and Output Summary (last 24 hours): Intake/Output Summary (Last 24 hours) at 2019 0023  Last data filed at 2019 1741  Gross per 24 hour   Intake 566 ml   Output --   Net 566 ml       Physical Exam:     Physical Exam   Constitutional: She is oriented to person, place, and time  obese   HENT:   Head: Normocephalic and atraumatic  Eyes: Pupils are equal, round, and reactive to light  Neck: Normal range of motion  No JVD present  No tracheal deviation present  Cardiovascular: Normal rate  Pulmonary/Chest: No stridor  No respiratory distress  She has no wheezes  She has rales  She exhibits no tenderness  Poor effort due to back pain    Abdominal: Soft  She exhibits no distension and no mass  There is no tenderness  There is no rebound and no guarding  Musculoskeletal: She exhibits no edema or tenderness  Lymphadenopathy:     She has no cervical adenopathy  Neurological: She is oriented to person, place, and time  Skin: Skin is warm  Capillary refill takes less than 2 seconds  No rash noted  No erythema  No pallor  Additional Data:     Labs:    Results from last 7 days   Lab Units 02/11/19  0548   WBC Thousand/uL 17 32*   HEMOGLOBIN g/dL 13 5   HEMATOCRIT % 41 0   PLATELETS Thousands/uL 213   NEUTROS PCT % 79*   LYMPHS PCT % 13*   MONOS PCT % 7   EOS PCT % 0     Results from last 7 days   Lab Units 02/11/19  0548 02/10/19  1656   SODIUM mmol/L 139 139   POTASSIUM mmol/L 3 8 4 2   CHLORIDE mmol/L 108 105   CO2 mmol/L 26 28   BUN mg/dL 12 13   CREATININE mg/dL 0 72 0 83   ANION GAP mmol/L 5 6   CALCIUM mg/dL 8 7 8 9   ALBUMIN g/dL  --  3 8   TOTAL BILIRUBIN mg/dL  --  0 57   ALK PHOS U/L  --  54   ALT U/L  --  29   AST U/L  --  21   GLUCOSE RANDOM mg/dL 90 143*     Results from last 7 days   Lab Units 02/11/19  0023   INR  1 02                       * I Have Reviewed All Lab Data Listed Above  * Additional Pertinent Lab Tests Reviewed:  All Labs Within Last 24 Hours Reviewed    Imaging:    Imaging Reports Reviewed Today Include: reviewed   Recent Cultures (last 7 days):           Last 24 Hours Medication List:     Current Facility-Administered Medications:  baclofen 10 mg Oral BID Hector Webb MD   dexamethasone 4 mg Intravenous Replaced by [facility] Hector Webb MD docusate sodium 100 mg Oral BID PRN Jone Quesada MD   enoxaparin 40 mg Subcutaneous Daily Jone Quesada MD   ibuprofen 600 mg Oral Q8H PRN Jone Quesada MD   morphine injection 4 mg Intravenous Q4H PRN Jone Quesada MD   ondansetron 4 mg Intravenous Q6H PRN Jone Quesada MD   pantoprazole 40 mg Oral Early Morning Jone Quesada MD        Today, Patient Was Seen By: DAMION Boyle    ** Please Note: Dictation voice to text software may have been used in the creation of this document   **

## 2019-02-12 NOTE — PROGRESS NOTES
OR time available tomorrow - will undergo left L5-S1 lumbar laminotomy/diskectomy  Preop orders and nose to toes protocol ordered  NPO after midnight  Discontinue DVT prophylaxis  Please call with questions or concerns  Progress Note - Neurosurgery   Ute Kim 33 y o  female MRN: [id number]  Unit/Bed#: CW2 217-03 Encounter: [id number]    Assessment:  1  Disc herniation at L5-S1  2  Back pain in lumbosacral region with radiculopathy  3  Ambulatory dysfunction     Plan:  · Exam:  Alert and oriented x3, moving all extremities with weakness noted in proximal LLE > RLE secondary to pain inhibition, sensation intact, JPS 3/3 at bilateral toes, DST intact, midline lumbar tenderness to palpation  · Imaging reviewed personally and with attending  Results are as follows:  ? MRI lumbar spine without contrast 02/11/2019:   disc bulge with superimposed central/left paracentral disc extrusion at level L5-S1 with 8 millimeter cephalad migration and abutment of ascending S1 nerve root, left greater than right  This causes mild canal narrowing at this level  ? CT abdomen pelvis with contrast 02/10/2019:  Prominent disc bulge at L5-S1 causing severe spinal canal narrowing  · Medical management per primary team  · DVT ppx:  SCDs and Lovenox  · Mobilize as tolerated with assistance  ? PT / OT  · Neurosurgery will continue to follow in the setting of back pain with radiculopathy and L5-S1 disc bulge  ? Patient would like to move forward with surgery  ? Need to check OR availability for this week, surgery on this admission is very much tentative at this juncture  ? Since patient does not exhibit red flag signs at this time, it is reasonable for her be a discharge readmit for surgery with close follow up in the office to plan intervention (about 1-2 weeks) if surgery cannot be done while inpatient  ?  Please call with questions or concerns    Subjective/Objective   Chief Complaint: "I have leg pain " / follow up L5-S1 disc bulge    Subjective: Patient complains of 8/10 LLE pain that is radiating into buttock and posterior thigh  She states the heating pad she is using helps a little with pain  She slept fine last night  Able to ambulate to the bathroom using the walker for support but admits it is painful to step down on her feet  She denies numbness / weakness / tingling at this time  No saddle anesthesia, bowel / bladder issues  Denies headache, dizziness, CP, SOB, abdominal pain, N/V  Patient states she would like to have surgery but is afraid if she goes home prior to having intervention she will continue to have pain  Objective: laying in bed, sleeping but easily woken up, NAD  I/O       02/10 0701 - 02/11 0700 02/11 0701 - 02/12 0700 02/12 0701 - 02/13 0700    P  O   566     IV Piggyback 1000      Total Intake(mL/kg) 1000 (13) 566 (7 3)     Net +1000 +566                  Invasive Devices     Peripheral Intravenous Line            Peripheral IV 02/10/19 Right Antecubital 1 day                Physical Exam:  Vitals: Blood pressure 115/65, pulse 88, temperature 98 7 °F (37 1 °C), temperature source Oral, resp  rate 18, height 4' 11" (1 499 m), weight 77 1 kg (170 lb), last menstrual period 01/10/2019, SpO2 96 %  ,Body mass index is 34 34 kg/m²      General appearance: alert, appears stated age, cooperative and no distress  Head: Normocephalic, without obvious abnormality, atraumatic  Eyes: EOMI, PERRL  Neck: supple, symmetrical, trachea midline and NT  Back: no kyphosis present, TTP along lumbar midline spine  Lungs: non labored breathing  Heart: regular heart rate  Neurologic:   Mental status: Alert, oriented x 3, follows commands, thought content appropriate  Cranial nerves: grossly intact (Cranial nerves II-XII)  Sensory: normal to LT and PP, JPS 3/3 at bilateral toes, DST intact  Motor: moving all extremities with weakness noted in BLE, more pain inhibition than true weakness, strength 5/5 except as noted:   RLE:  4/5 HF/HE, 4/5 KE/KF, pain felt in back   LLE: 2-3 HF/HE, 4-/5 KE/KF, pain felt in LLE  Reflexes: 2+ and symmetric, no erwin or clonus noted  Coordination: finger to nose normal bilaterally, no drift bilaterally    Lab Results:  Results from last 7 days   Lab Units 02/11/19  0548 02/10/19  1656   WBC Thousand/uL 17 32* 10 34*   HEMOGLOBIN g/dL 13 5 14 7   HEMATOCRIT % 41 0 44 8   PLATELETS Thousands/uL 213 208   NEUTROS PCT % 79* 84*   MONOS PCT % 7 3*     Results from last 7 days   Lab Units 02/11/19  0548 02/10/19  1656   POTASSIUM mmol/L 3 8 4 2   CHLORIDE mmol/L 108 105   CO2 mmol/L 26 28   BUN mg/dL 12 13   CREATININE mg/dL 0 72 0 83   CALCIUM mg/dL 8 7 8 9   ALK PHOS U/L  --  54   ALT U/L  --  29   AST U/L  --  21     Results from last 7 days   Lab Units 02/11/19  0548   MAGNESIUM mg/dL 2 2     Results from last 7 days   Lab Units 02/11/19  0548   PHOSPHORUS mg/dL 2 6*     Results from last 7 days   Lab Units 02/11/19  0023   INR  1 02   PTT seconds 25*       Imaging Studies: I have personally reviewed pertinent reports  and I have personally reviewed pertinent films in PACS    Mri Lumbar Spine Wo Contrast    Result Date: 2/11/2019  Impression: 1  Disc bulge with superimposed central/left paracentral disc extrusion at level L5-S1 with 8 mm cephalad migration and abutment of descending S1 nerve roots, left greater than right  It causes mild canal narrowing at this level  2   Partially imaged left ovarian cyst with internal septa versus 2 adjacent cysts  Recommend dedicated pelvic ultrasound for further evaluation  if indicated  The study was marked in EPIC for significant notification  Workstation performed: [id number]     Ct Abdomen Pelvis With Contrast    Result Date: 2/10/2019  Impression: Prominent disc bulge at L5-S1 causing severe spinal canal narrowing, correlate with lumbar spine MRI  Increased endometrial fluid and 3 cm left ovarian cyst, correlate with beta-hCG   If positive beta hCG, correlate with pelvic ultrasound  Workstation performed: [id number]     EKG, Pathology, and Other Studies: I have personally reviewed pertinent reports        VTE Pharmacologic Prophylaxis: Enoxaparin (Lovenox)    VTE Mechanical Prophylaxis: sequential compression device

## 2019-02-12 NOTE — ASSESSMENT & PLAN NOTE
Adult pain management  Lidocaine patch  Heating pad  CT abdomen and pelvis:   Prominent disc bulge at L5-S1 causing severe spinal canal narrowing, correlate with lumbar spine MRI  Increased endometrial fluid and 3 cm left ovarian cyst, correlate with beta-hCG  If positive beta hCG, correlate with pelvic ultrasound  HCG obtained and negative     Appreciate neurosx discussed with later as pt was pending MRI   MRI this afternoon demonstrates:    1   Disc bulge with superimposed central/left paracentral disc extrusion at level L5-S1 with 8 mm cephalad migration and abutment of descending S1 nerve roots, left greater than right   It causes mild canal narrowing at this level  2   Partially imaged left ovarian cyst with internal septa versus 2 adjacent cysts        Patient was given prednisone 60 mg will transformed to dexamethasone 4 mg every 8 hours for now; neurosurgery consult appreciated     Discussed with neurosx they are waiting on pts decision    After I spoke with the patient the patient states she has spoken with her mother and she is agreeable to surgery which per neurosx will be later this coming weak (about Thursday)

## 2019-02-12 NOTE — ASSESSMENT & PLAN NOTE
· CT scan on admission- "Increased endometrial fluid and 3 cm left ovarian cyst, correlate with beta-hCG   If positive beta hCG, correlate with pelvic ultrasound "  · HCG obtained and negative

## 2019-02-12 NOTE — PROGRESS NOTES
Progress Note - Bertrand Peterson 1990, 33 y o  female MRN: [id number]  Unit/Bed#: CW2 217-03 Encounter: [id number]  Primary Care Provider: DAMION Robertson   Date and time admitted to hospital: 2/10/2019  1:55 PM    * Back pain of lumbosacral region with radiculopathy  Assessment & Plan  · Secondary to disc herniation/bulge at L5-S1  Weakness noted in proximal left lower extremity greater than right lower extremity likely secondary to pain inhibition  Sensation grossly intact  · MRI lumbar spine without contrast 02/11/2019:   disc bulge with superimposed central/left paracentral disc extrusion at level L5-S1 with 8 millimeter cephalad migration and abutment of ascending S1 nerve root, left greater than right  This causes mild canal narrowing at this level  · CT abdomen pelvis with contrast 02/10/2019:  Prominent disc bulge at L5-S1 causing severe spinal canal narrowing  · PT/OT  · Optimize pain control- will add scheduled Tylenol, add gabapentin 100 mg t i d  Up titrate in the next 48 hr, change baclofen to p r n , add lidocaine patch, p r n  Motrin, oxycodone, and Dilaudid  · Change steroids to p o  Medrol Dosepak  · Neurosurgery following, patient would like to move forward with surgery, planned for 2/13    Fluid in endometrial cavity  Assessment & Plan  · CT scan on admission- "Increased endometrial fluid and 3 cm left ovarian cyst, correlate with beta-hCG  If positive beta hCG, correlate with pelvic ultrasound "  · HCG obtained and negative     Ambulatory dysfunction  Assessment & Plan  · Optimize pain management   · Likely secondary to above  · Please note patient will need work no with restrictions 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[employer]    VTE Pharmacologic Prophylaxis:   Pharmacologic: Enoxaparin (Lovenox)  Mechanical VTE Prophylaxis in Place: Yes    Patient Centered Rounds: I have performed bedside rounds with nursing staff today      Discussions with Specialists or Other Care Team Provider: nursing, neurosx pa      Education and Discussions with Family / Patient: patient     Time Spent for Care: 30 minutes  More than 50% of total time spent on counseling and coordination of care as described above  Current Length of Stay: 0 day(s)    Current Patient Status: Observation   Certification Statement: The patient will continue to require additional inpatient hospital stay due to neurosurgical intervention, pain control     Discharge Plan / Estimated Discharge Date: pending     Code Status: Level 1 - Full Code    Subjective:   Pt seen and examined at bedside  Still with significant left sided back pain  No numbness or tingling  Objective:     Vitals:   Temp (24hrs), Av 7 °F (37 1 °C), Min:98 7 °F (37 1 °C), Max:98 7 °F (37 1 °C)    Temp:  [98 7 °F (37 1 °C)] 98 7 °F (37 1 °C)  HR:  [88-98] 88  Resp:  [16-18] 18  BP: (115-139)/(64-65) 115/65  SpO2:  [95 %-96 %] 96 %  Body mass index is 34 34 kg/m²  Input and Output Summary (last 24 hours): Intake/Output Summary (Last 24 hours) at 2019 1035  Last data filed at 2019 1741  Gross per 24 hour   Intake 446 ml   Output --   Net 446 ml       Physical Exam:     Physical Exam   Constitutional: She is oriented to person, place, and time  She appears well-developed and well-nourished  HENT:   Head: Normocephalic and atraumatic  Eyes: EOM are normal  No scleral icterus  Neck: Normal range of motion  Neck supple  Cardiovascular: Normal rate and regular rhythm  No murmur heard  Pulmonary/Chest: Effort normal and breath sounds normal    Abdominal: Soft  Bowel sounds are normal    Neurological: She is alert and oriented to person, place, and time  LLE weakness >RLE proximally more than distally suspect due to pain    Skin: Skin is warm and dry  Psychiatric: She has a normal mood and affect       Additional Data:     Labs:    Results from last 7 days   Lab Units 19  0548   WBC Thousand/uL 17 32*   HEMOGLOBIN g/dL 13 5   HEMATOCRIT % 41 0   PLATELETS Thousands/uL 213   NEUTROS PCT % 79*   LYMPHS PCT % 13*   MONOS PCT % 7   EOS PCT % 0     Results from last 7 days   Lab Units 02/11/19  0548 02/10/19  1656   POTASSIUM mmol/L 3 8 4 2   CHLORIDE mmol/L 108 105   CO2 mmol/L 26 28   BUN mg/dL 12 13   CREATININE mg/dL 0 72 0 83   CALCIUM mg/dL 8 7 8 9   ALK PHOS U/L  --  54   ALT U/L  --  29   AST U/L  --  21     Results from last 7 days   Lab Units 02/11/19  0023   INR  1 02       * I Have Reviewed All Lab Data Listed Above  * Additional Pertinent Lab Tests Reviewed: Felician[address] 66 Admission Reviewed    Imaging:    Imaging Reports Reviewed Today Include: all  Imaging Personally Reviewed by Myself Includes:  None     Recent Cultures (last 7 days):           Last 24 Hours Medication List:     Current Facility-Administered Medications:  acetaminophen 975 mg Oral Q8H [address] Memo Griggs PA-C   baclofen 10 mg Oral BID PRN Louis Griggs PA-C   benzonatate 100 mg Oral TID PRN Louis Griggs PA-C   dexamethasone 4 mg Intravenous Q8H [address] Yaneth Schaefer MD   docusate sodium 100 mg Oral BID PRN Yaneth Schaefer MD   gabapentin 100 mg Oral TID Teodora Griggs PA-C   HYDROmorphone 0 5 mg Intravenous Q4H PRN Memo Griggs PA-C   ibuprofen 600 mg Oral Q8H PRN Yaneth Schaefer MD   lidocaine 1 patch Topical Daily Memo Griggs PA-C   ondansetron 4 mg Intravenous Q6H PRN Yaneth Schaefer MD   oxyCODONE 10 mg Oral Q4H PRN Memo Griggs PA-C   oxyCODONE 5 mg Oral Q4H PRN Memo Griggs PA-C   pantoprazole 40 mg Oral Early Morning Yaneth Schaefer MD        Today, Patient Was Seen By: Rowdy Kong PA-C    ** Please Note: This note has been constructed using a voice recognition system   **

## 2019-02-12 NOTE — ASSESSMENT & PLAN NOTE
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 but she would need to give them a note to be out of work   I stated we would know better tomorrow as definitive decision on surgery will be made tomorrow by neurosx

## 2019-02-12 NOTE — ASSESSMENT & PLAN NOTE
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

## 2019-02-12 NOTE — ASSESSMENT & PLAN NOTE
· Secondary to disc herniation/bulge at L5-S1  Weakness noted in proximal left lower extremity greater than right lower extremity likely secondary to pain inhibition  Sensation grossly intact  · MRI lumbar spine without contrast 02/11/2019:   disc bulge with superimposed central/left paracentral disc extrusion at level L5-S1 with 8 millimeter cephalad migration and abutment of ascending S1 nerve root, left greater than right  This causes mild canal narrowing at this level  · CT abdomen pelvis with contrast 02/10/2019:  Prominent disc bulge at L5-S1 causing severe spinal canal narrowing  · PT/OT  · Optimize pain control- will add scheduled Tylenol, add gabapentin 100 mg t i d  Up titrate in the next 48 hr, change baclofen to p r n , add lidocaine patch, p r n  Motrin, oxycodone, and Dilaudid  · Change steroids to p o   Medrol Dosepak  · Neurosurgery following, patient would like to move forward with surgery, planned for 2/13

## 2019-02-12 NOTE — UTILIZATION REVIEW
Initial Clinical Review    Admission: Date/Time/Statement: Observation 2/10 and changed to Inpatient on 2/12 @ 1133  Pt requiring further stay for Inpatient Surgery on 2/13 and pain management    Admitting Physician ÁNGEL FLORES [facility] ANGIE    Level of Care Med Surg    Estimated length of stay More than 2 Midnights    Certification I certify that inpatient services are medically necessary for this patient for a duration of greater than two midnights  See H&P and MD Progress Notes for additional information about the patient's course of treatment  Start Status          ED: Date/Time/Mode of Arrival:   ED Arrival Information     Expected Arrival Acuity Means of Arrival Escorted By Service Admission Type    - 2/10/2019 13:52 [facility] General Medicine Urgent    Arrival Complaint    Back pain         Chief Complaint:   Chief Complaint   Patient presents with    Back Pain     pt with back pain x1 month  see here for same and given motrin  today while brushing teeth pain became worse  pt refusing to walk or move  pt crying in pain  pt states she has been taking motrin with no relief  denies injury  History of Illness: 25-year-old primarily [place]-speaking female with  translating  Pt reports that she has had back pain for about a month now that became acutely worse this morning while she was brushing her teeth  She was seen here after a fall with low back pain and right hip contusion on February 1st and states she followed up with PCP who referred her for PT which she is supposed to start tomorrow morning  She reports pain in the middle of her low back that radiates down both buttocks and upper legs left side worse than right with some a tingling on the left   Pain is worse with position changes and better at rest       ED Vital Signs:   ED Triage Vitals   Temperature Pulse Respirations Blood Pressure SpO2   02/10/19 1358 02/10/19 1358 02/10/19 1358 02/10/19 1358 02/10/19 1358   98 1 °F (36 7 °C) 90 22 134/78 98 %      Temp Source Heart Rate Source Patient Position - Orthostatic VS BP Location FiO2 (%)   02/10/19 2300 02/11/19 0806 02/10/19 2300 02/10/19 2300 --   Oral Monitor Lying Right arm       Pain Score       02/10/19 1358       9          Wt Readings from Last 1 Encounters:   02/10/19 77 1 kg (170 lb)     Vital Signs (abnormal): WNL  Pertinent Labs/Diagnostic Test Results: WBC 10 34, GLUC 143,   Blood, UA TraceAbnormal       WBC, UA 2-4Abnormal     Epithelial Cells ModerateAbnormal       CT AP:  Prominent disc bulge at L5-S1 causing severe spinal canal narrowing, correlate with lumbar spine MRI  Increased endometrial fluid and 3 cm left ovarian cyst, correlate with beta-hCG  If positive beta hCG, correlate with pelvic ultrasound  MRI Lumbar Spine -  Disc bulge with superimposed central/left paracentral disc extrusion at level L5-S1 with 8 mm cephalad migration and abutment of descending S1 nerve roots, left greater than right  It causes mild canal narrowing at this level      2   Partially imaged left ovarian cyst with internal septa versus 2 adjacent cysts  Recommend dedicated pelvic ultrasound for further evaluation  if indicated      ED Treatment:   Medication Administration from 02/10/2019 1352 to 02/10/2019 2209       Date/Time Order Dose Route Action     02/10/2019 1457 ketorolac (TORADOL) injection 30 mg 30 mg Intramuscular Given     02/10/2019 1457 predniSONE tablet 60 mg 60 mg Oral Given     02/10/2019 1457 methocarbamol (ROBAXIN) tablet 500 mg 500 mg Oral Given     02/10/2019 1458 lidocaine (LIDODERM) 5 % patch 1 patch 1 patch Topical Medication Applied     02/10/2019 1657 sodium chloride 0 9 % bolus 1,000 mL 1,000 mL Intravenous New Bag     02/10/2019 1658 morphine (PF) 4 mg/mL injection 4 mg 4 mg Intravenous Given     02/10/2019 1855 iohexol (OMNIPAQUE) 350 MG/ML injection (MULTI-DOSE) 100 mL 100 mL Intravenous Given        Past Medical/Surgical History: Active Ambulatory Problems     Diagnosis Date Noted    KELLY (obstructive sleep apnea)     Periodic limb movements of sleep     Diarrhea 11/20/2018    Diffuse abdominal pain 11/20/2018    Irritable bowel syndrome with diarrhea 11/20/2018     Resolved Ambulatory Problems     Diagnosis Date Noted    No Resolved Ambulatory Problems     Past Medical History:   Diagnosis Date    GERD (gastroesophageal reflux disease)     Obesity      Admitting Diagnosis: Lumbosacral radiculopathy [M54 17]  Back pain [M54 9]  Spinal cord compression (HCC) [G95 20]  Abdominal pain [R10 9]  Herniation of intervertebral disc between L5 and S1 [M51 27]     Age/Sex: 33 y o  female     Assessment/Plan:  33y Female to ED with Left-sided radicular back pain  Pt with significant history for lumbar back pain; this has been going on for about a month now  She works as part of housekeeping  Over the past month she has been complaining of this left-sided back pain that radiates from the back going towards the buttocks and towards her for foot  Per patient radiates and trace is going to small toe and tingling  ED eval showed to have bulging discs on L5-S1 level on the left side  Pt is being admitted with Back pain of lumbosacral region with sciatica/ Lumbosacral radiculopathy/ Ambulatory dysfunction  Adult pain management  Lidocaine patch  Heating pad  MRI tomorrow  Patient was given prednisone 60 mg will transformed to dexamethasone 4 mg every 8 hours for now; neurosurgery consult     ----------------------------------------------------------------------------------------  2/11 Neurosurgery cons:  Disc herniation at L5-S1  Back pain in lumbosacral region with radiculopathy  Ambulatory dysfunction    Surgical intervention versus conservative management - patient will likely need surgery at some point as it is unlikely that disc will resolve itself  Patient would like to discuss with mother before making a decision    Neurosurgery will continue to follow in the setting of back pain with radiculopathy and L5-S1 disc bulge  Patient may opt for surgical intervention on this admission, would tentatively go Wednesday if agreeable and OR time is available  Admission Orders:  2/13 OR - LUMBAR LAMINOTOMY/DISCECTOMY L5-S1, LEFT (Left Spine Lumbar)    [place]  MRI Lspine  Consult neurosurgery  Sequential compression device     Scheduled Meds:   Current Facility-Administered Medications:  baclofen 10 mg Oral BID   dexamethasone 4 mg Intravenous Q8H [address]   enoxaparin 40 mg Subcutaneous Daily   lidocaine 1 patch Topical Daily   pantoprazole 40 mg Oral Early Morning     Continuous Infusions:    PRN Meds: docusate sodium    ibuprofen    morphine injection    ondansetron    -----------------------------------------------------------------------------------------------    2/12 Progress notes:  Back pain of lumbosacral region with radiculopathy    Optimize pain control- will add scheduled Tylenol, add gabapentin 100 mg t i d  Up titrate in the next 48 hr, change baclofen to p r n , add lidocaine patch, p r n  Motrin, oxycodone, and Dilaudid  Change steroids to p o   C/ Ovi Stiles 19  Neurosurgery following, patient would like to move forward with surgery, planned for 2/13     PRN Meds:  Motrin po x1  Oxycodone po x1

## 2019-02-13 ENCOUNTER — ANESTHESIA EVENT (INPATIENT)
Dept: PERIOP | Facility: HOSPITAL | Age: 29
DRG: 310 | End: 2019-02-13
Payer: COMMERCIAL

## 2019-02-13 ENCOUNTER — ANESTHESIA (INPATIENT)
Dept: PERIOP | Facility: HOSPITAL | Age: 29
DRG: 310 | End: 2019-02-13
Payer: COMMERCIAL

## 2019-02-13 ENCOUNTER — APPOINTMENT (INPATIENT)
Dept: RADIOLOGY | Facility: HOSPITAL | Age: 29
DRG: 310 | End: 2019-02-13
Payer: COMMERCIAL

## 2019-02-13 LAB — GLUCOSE SERPL-MCNC: 138 MG/DL (ref 65–140)

## 2019-02-13 PROCEDURE — 99232 SBSQ HOSP IP/OBS MODERATE 35: CPT | Performed by: NURSE PRACTITIONER

## 2019-02-13 PROCEDURE — 63030 LAMOT DCMPRN NRV RT 1 LMBR: CPT | Performed by: NEUROLOGICAL SURGERY

## 2019-02-13 PROCEDURE — 00NY0ZZ RELEASE LUMBAR SPINAL CORD, OPEN APPROACH: ICD-10-PCS | Performed by: NEUROLOGICAL SURGERY

## 2019-02-13 PROCEDURE — 82948 REAGENT STRIP/BLOOD GLUCOSE: CPT

## 2019-02-13 PROCEDURE — 0SB40ZZ EXCISION OF LUMBOSACRAL DISC, OPEN APPROACH: ICD-10-PCS | Performed by: NEUROLOGICAL SURGERY

## 2019-02-13 PROCEDURE — 72020 X-RAY EXAM OF SPINE 1 VIEW: CPT

## 2019-02-13 RX ORDER — MIDAZOLAM HYDROCHLORIDE 1 MG/ML
INJECTION INTRAMUSCULAR; INTRAVENOUS AS NEEDED
Status: DISCONTINUED | OUTPATIENT
Start: 2019-02-13 | End: 2019-02-13 | Stop reason: SURG

## 2019-02-13 RX ORDER — FENTANYL CITRATE/PF 50 MCG/ML
50 SYRINGE (ML) INJECTION
Status: DISCONTINUED | OUTPATIENT
Start: 2019-02-13 | End: 2019-02-13 | Stop reason: HOSPADM

## 2019-02-13 RX ORDER — METHYLPREDNISOLONE ACETATE 40 MG/ML
INJECTION, SUSPENSION INTRA-ARTICULAR; INTRALESIONAL; INTRAMUSCULAR; SOFT TISSUE AS NEEDED
Status: DISCONTINUED | OUTPATIENT
Start: 2019-02-13 | End: 2019-02-13 | Stop reason: HOSPADM

## 2019-02-13 RX ORDER — METOCLOPRAMIDE HYDROCHLORIDE 5 MG/ML
10 INJECTION INTRAMUSCULAR; INTRAVENOUS ONCE AS NEEDED
Status: DISCONTINUED | OUTPATIENT
Start: 2019-02-13 | End: 2019-02-13 | Stop reason: HOSPADM

## 2019-02-13 RX ORDER — FENTANYL CITRATE/PF 50 MCG/ML
50 SYRINGE (ML) INJECTION
Status: DISCONTINUED | OUTPATIENT
Start: 2019-02-13 | End: 2019-02-13

## 2019-02-13 RX ORDER — FENTANYL CITRATE 50 UG/ML
INJECTION, SOLUTION INTRAMUSCULAR; INTRAVENOUS AS NEEDED
Status: DISCONTINUED | OUTPATIENT
Start: 2019-02-13 | End: 2019-02-13 | Stop reason: SURG

## 2019-02-13 RX ORDER — SODIUM CHLORIDE, SODIUM LACTATE, POTASSIUM CHLORIDE, CALCIUM CHLORIDE 600; 310; 30; 20 MG/100ML; MG/100ML; MG/100ML; MG/100ML
100 INJECTION, SOLUTION INTRAVENOUS CONTINUOUS
Status: DISCONTINUED | OUTPATIENT
Start: 2019-02-13 | End: 2019-02-15

## 2019-02-13 RX ORDER — LIDOCAINE HYDROCHLORIDE 10 MG/ML
INJECTION, SOLUTION INFILTRATION; PERINEURAL AS NEEDED
Status: DISCONTINUED | OUTPATIENT
Start: 2019-02-13 | End: 2019-02-13 | Stop reason: SURG

## 2019-02-13 RX ORDER — HYDROMORPHONE HCL/PF 1 MG/ML
0.5 SYRINGE (ML) INJECTION
Status: DISCONTINUED | OUTPATIENT
Start: 2019-02-13 | End: 2019-02-13

## 2019-02-13 RX ORDER — HYDROMORPHONE HCL/PF 1 MG/ML
0.2 SYRINGE (ML) INJECTION
Status: DISCONTINUED | OUTPATIENT
Start: 2019-02-13 | End: 2019-02-13 | Stop reason: HOSPADM

## 2019-02-13 RX ORDER — BUPIVACAINE HYDROCHLORIDE 2.5 MG/ML
INJECTION, SOLUTION EPIDURAL; INFILTRATION; INTRACAUDAL AS NEEDED
Status: DISCONTINUED | OUTPATIENT
Start: 2019-02-13 | End: 2019-02-13 | Stop reason: HOSPADM

## 2019-02-13 RX ORDER — PROPOFOL 10 MG/ML
INJECTION, EMULSION INTRAVENOUS AS NEEDED
Status: DISCONTINUED | OUTPATIENT
Start: 2019-02-13 | End: 2019-02-13 | Stop reason: SURG

## 2019-02-13 RX ORDER — ONDANSETRON 2 MG/ML
INJECTION INTRAMUSCULAR; INTRAVENOUS AS NEEDED
Status: DISCONTINUED | OUTPATIENT
Start: 2019-02-13 | End: 2019-02-13 | Stop reason: SURG

## 2019-02-13 RX ORDER — GINSENG 100 MG
CAPSULE ORAL AS NEEDED
Status: DISCONTINUED | OUTPATIENT
Start: 2019-02-13 | End: 2019-02-13 | Stop reason: HOSPADM

## 2019-02-13 RX ORDER — ONDANSETRON 2 MG/ML
4 INJECTION INTRAMUSCULAR; INTRAVENOUS ONCE AS NEEDED
Status: DISCONTINUED | OUTPATIENT
Start: 2019-02-13 | End: 2019-02-13

## 2019-02-13 RX ORDER — METOCLOPRAMIDE HYDROCHLORIDE 5 MG/ML
10 INJECTION INTRAMUSCULAR; INTRAVENOUS ONCE AS NEEDED
Status: DISCONTINUED | OUTPATIENT
Start: 2019-02-13 | End: 2019-02-13

## 2019-02-13 RX ORDER — ROCURONIUM BROMIDE 10 MG/ML
INJECTION, SOLUTION INTRAVENOUS AS NEEDED
Status: DISCONTINUED | OUTPATIENT
Start: 2019-02-13 | End: 2019-02-13 | Stop reason: SURG

## 2019-02-13 RX ORDER — LIDOCAINE HYDROCHLORIDE AND EPINEPHRINE 10; 10 MG/ML; UG/ML
INJECTION, SOLUTION INFILTRATION; PERINEURAL AS NEEDED
Status: DISCONTINUED | OUTPATIENT
Start: 2019-02-13 | End: 2019-02-13 | Stop reason: HOSPADM

## 2019-02-13 RX ORDER — NEOSTIGMINE METHYLSULFATE 1 MG/ML
INJECTION INTRAVENOUS AS NEEDED
Status: DISCONTINUED | OUTPATIENT
Start: 2019-02-13 | End: 2019-02-13 | Stop reason: SURG

## 2019-02-13 RX ORDER — KETAMINE HYDROCHLORIDE 50 MG/ML
INJECTION, SOLUTION, CONCENTRATE INTRAMUSCULAR; INTRAVENOUS AS NEEDED
Status: DISCONTINUED | OUTPATIENT
Start: 2019-02-13 | End: 2019-02-13 | Stop reason: SURG

## 2019-02-13 RX ORDER — GLYCOPYRROLATE 0.2 MG/ML
INJECTION INTRAMUSCULAR; INTRAVENOUS AS NEEDED
Status: DISCONTINUED | OUTPATIENT
Start: 2019-02-13 | End: 2019-02-13 | Stop reason: SURG

## 2019-02-13 RX ADMIN — CHLORHEXIDINE GLUCONATE 0.12% ORAL RINSE 15 ML: 1.2 LIQUID ORAL at 10:49

## 2019-02-13 RX ADMIN — HYDROMORPHONE HYDROCHLORIDE 0.5 MG: 1 INJECTION, SOLUTION INTRAMUSCULAR; INTRAVENOUS; SUBCUTANEOUS at 13:19

## 2019-02-13 RX ADMIN — HYDROMORPHONE HYDROCHLORIDE 0.5 MG: 1 INJECTION, SOLUTION INTRAMUSCULAR; INTRAVENOUS; SUBCUTANEOUS at 22:27

## 2019-02-13 RX ADMIN — OXYCODONE HYDROCHLORIDE 10 MG: 10 TABLET ORAL at 19:36

## 2019-02-13 RX ADMIN — GABAPENTIN 100 MG: 100 CAPSULE ORAL at 22:06

## 2019-02-13 RX ADMIN — GABAPENTIN 100 MG: 100 CAPSULE ORAL at 15:42

## 2019-02-13 RX ADMIN — FENTANYL CITRATE 50 MCG: 50 INJECTION, SOLUTION INTRAMUSCULAR; INTRAVENOUS at 11:03

## 2019-02-13 RX ADMIN — FENTANYL CITRATE 50 MCG: 50 INJECTION, SOLUTION INTRAMUSCULAR; INTRAVENOUS at 13:01

## 2019-02-13 RX ADMIN — SODIUM CHLORIDE 125 ML/HR: 0.9 INJECTION, SOLUTION INTRAVENOUS at 16:45

## 2019-02-13 RX ADMIN — MIDAZOLAM 2 MG: 1 INJECTION INTRAMUSCULAR; INTRAVENOUS at 10:57

## 2019-02-13 RX ADMIN — BACLOFEN 10 MG: 10 TABLET ORAL at 06:15

## 2019-02-13 RX ADMIN — ACETAMINOPHEN 975 MG: 325 TABLET, FILM COATED ORAL at 14:37

## 2019-02-13 RX ADMIN — ONDANSETRON 8 MG: 2 INJECTION INTRAMUSCULAR; INTRAVENOUS at 11:47

## 2019-02-13 RX ADMIN — OXYCODONE HYDROCHLORIDE 10 MG: 10 TABLET ORAL at 15:41

## 2019-02-13 RX ADMIN — FENTANYL CITRATE 50 MCG: 50 INJECTION, SOLUTION INTRAMUSCULAR; INTRAVENOUS at 11:20

## 2019-02-13 RX ADMIN — BENZONATATE 100 MG: 100 CAPSULE ORAL at 08:44

## 2019-02-13 RX ADMIN — ACETAMINOPHEN 975 MG: 325 TABLET, FILM COATED ORAL at 06:15

## 2019-02-13 RX ADMIN — NEOSTIGMINE METHYLSULFATE 3 MG: 1 INJECTION INTRAVENOUS at 12:00

## 2019-02-13 RX ADMIN — ROCURONIUM BROMIDE 30 MG: 10 INJECTION INTRAVENOUS at 11:03

## 2019-02-13 RX ADMIN — DEXAMETHASONE SODIUM PHOSPHATE 10 MG: 10 INJECTION INTRAMUSCULAR; INTRAVENOUS at 11:47

## 2019-02-13 RX ADMIN — PANTOPRAZOLE SODIUM 40 MG: 40 TABLET, DELAYED RELEASE ORAL at 06:16

## 2019-02-13 RX ADMIN — HYDROMORPHONE HYDROCHLORIDE 0.5 MG: 1 INJECTION, SOLUTION INTRAMUSCULAR; INTRAVENOUS; SUBCUTANEOUS at 13:11

## 2019-02-13 RX ADMIN — VANCOMYCIN HYDROCHLORIDE 1250 MG: 10 INJECTION, POWDER, LYOPHILIZED, FOR SOLUTION INTRAVENOUS at 22:27

## 2019-02-13 RX ADMIN — SODIUM CHLORIDE: 0.9 INJECTION, SOLUTION INTRAVENOUS at 12:03

## 2019-02-13 RX ADMIN — VANCOMYCIN HYDROCHLORIDE 1000 MG: 1 INJECTION, SOLUTION INTRAVENOUS at 10:36

## 2019-02-13 RX ADMIN — METHYLPREDNISOLONE 20 MG: 16 TABLET ORAL at 08:45

## 2019-02-13 RX ADMIN — PROPOFOL 200 MG: 10 INJECTION, EMULSION INTRAVENOUS at 11:03

## 2019-02-13 RX ADMIN — KETAMINE HYDROCHLORIDE 30 MG: 50 INJECTION, SOLUTION INTRAMUSCULAR; INTRAVENOUS at 11:33

## 2019-02-13 RX ADMIN — ROCURONIUM BROMIDE 10 MG: 10 INJECTION INTRAVENOUS at 11:32

## 2019-02-13 RX ADMIN — KETAMINE HYDROCHLORIDE 10 MG: 50 INJECTION, SOLUTION INTRAMUSCULAR; INTRAVENOUS at 11:49

## 2019-02-13 RX ADMIN — FENTANYL CITRATE 50 MCG: 50 INJECTION, SOLUTION INTRAMUSCULAR; INTRAVENOUS at 12:50

## 2019-02-13 RX ADMIN — GLYCOPYRROLATE 0.4 MG: 0.2 INJECTION, SOLUTION INTRAMUSCULAR; INTRAVENOUS at 12:00

## 2019-02-13 RX ADMIN — ACETAMINOPHEN 975 MG: 325 TABLET, FILM COATED ORAL at 22:06

## 2019-02-13 RX ADMIN — LIDOCAINE HYDROCHLORIDE 50 MG: 10 INJECTION, SOLUTION INFILTRATION; PERINEURAL at 11:03

## 2019-02-13 RX ADMIN — GABAPENTIN 100 MG: 100 CAPSULE ORAL at 08:44

## 2019-02-13 RX ADMIN — KETAMINE HYDROCHLORIDE 10 MG: 50 INJECTION, SOLUTION INTRAMUSCULAR; INTRAVENOUS at 11:59

## 2019-02-13 NOTE — ANESTHESIA POSTPROCEDURE EVALUATION
Post-Op Assessment Note    CV Status:  Stable  Pain Score: 0    Pain management: adequate     Mental Status:  Alert and awake   Hydration Status:  Euvolemic   PONV Controlled:  Controlled   Airway Patency:  Patent   Post Op Vitals Reviewed: Yes      Staff: CRNA           BP   117/73   Temp   98 4   Pulse  65   Resp   16   SpO2   98

## 2019-02-13 NOTE — PERIOPERATIVE NURSING NOTE
Patient rests quietly with eyes closed, when aroused pain 10/10   Patient returns quickly to sonorous respirations

## 2019-02-13 NOTE — ANESTHESIA PREPROCEDURE EVALUATION
Review of Systems/Medical History  Patient summary reviewed  Chart reviewed  No history of anesthetic complications     Cardiovascular  Negative cardio ROS Exercise tolerance (METS): >4,     Pulmonary  Sleep apnea ,        GI/Hepatic    GERD ,             Endo/Other    Obesity    GYN       Hematology   Musculoskeletal       Neurology   Psychology           Physical Exam    Airway    Mallampati score: II  TM Distance: >3 FB  Neck ROM: full     Dental       Cardiovascular  Comment: Negative ROS,     Pulmonary      Other Findings        Anesthesia Plan  ASA Score- 2     Anesthesia Type- general with ASA Monitors  Additional Monitors:   Airway Plan: ETT  Comment: Prone  Plan Factors-    Induction- intravenous  Postoperative Plan-     Informed Consent- Anesthetic plan and risks discussed with patient  I personally reviewed this patient with the CRNA  Discussed and agreed on the Anesthesia Plan with the CRNA  Dani Killian

## 2019-02-13 NOTE — ASSESSMENT & PLAN NOTE
· Secondary to disc herniation/bulge at L5-S1  Weakness noted in proximal left lower extremity greater than right lower extremity likely secondary to pain inhibition  Sensation remains intact  · MRI lumbar spine without contrast 02/11/2019:   disc bulge with superimposed central/left paracentral disc extrusion at level L5-S1 with 8 millimeter cephalad migration and abutment of ascending S1 nerve root, left greater than right  This causes mild canal narrowing at this level  · CT abdomen pelvis with contrast 02/10/2019:  Prominent disc bulge at L5-S1 causing severe spinal canal narrowing    · Plan or today with Neurosurgery for decompression  · PT/OT when okay with Neurosurgery  · Optimize pain control- continue scheduled Tylenol, Neurontin and Lidoderm patch  · Continue Medrol Dosepak  · Bowel regimen and place  · Encourage incentive spirometry

## 2019-02-13 NOTE — PROGRESS NOTES
Patient returned from PACU and alert and oriented times 4 will continue to monitor dressing to back is clean dry and intact pulses bilateral 2+

## 2019-02-13 NOTE — OP NOTE
OPERATIVE REPORT  PATIENT NAME: Jasson Garcia    :  1990  MRN: [id number]  Pt Location: BE OR ROOM 09    SURGERY DATE: 2019    Surgeon(s) and Role:     * Angela Styles MD - Primary     * Dana Cochran PA-C - Assisting    Preop Diagnosis:  Lumbosacral radiculopathy [M54 17]  Herniation of intervertebral disc between L5 and S1 [M51 27]    Post-Op Diagnosis Codes:     * Lumbosacral radiculopathy [M54 17]     * Herniation of intervertebral disc between L5 and S1 [M51 27]    Procedure(s) (LRB):  LUMBAR LAMINOTOMY/DISCECTOMY L5-S1, LEFT (Left)    Specimen(s):  * No specimens in log *    Estimated Blood Loss:   25 mL    Drains:  * No LDAs found *    Anesthesia Type:   General    Operative Indications:  Lumbosacral radiculopathy [M54 17]  Herniation of intervertebral disc between L5 and S1 [M51 27]      Operative Findings:  No large disc fragment, inflamed S1 nerve root    Complications:   None    Procedure and Technique:  The patient was taken out the operative theatre and induced under general anesthesia intubated she was positioned prone on david frame   We used anatomical landmarks to localize the midline lumbar incision over the L5-S1 disc space  she has prepped and draped in sterile fashion   Incision with a 10  Blade, dissected down using cautery  We dissected down to expose the interlaminar space of L5-S1 on the left  This was confirmed on fluoro   We then performed a laminotomy using the drill and Abebe Hare well as a medial facetectomy and laminotomy/foraminotomy of the L5 down to the S1 lamina   We were able to remove the yellow ligament and expose the underlying thecal sac   We could see the downgoing S1 nerve root   Which was quite decompressed   We then retracted the thecal sac medially including the S1 nerve root and exposed the L5-S1 disc space      We proceeded to remove some disc after I used a 15 blade for a small annulotomy   The nerve was quite decompressed therefore I decided to end at that point we obtained hemostasis and left some Depo-Medrol was in the epidural space  We then proceeded to close in layers with 0 Vicryl for the fascia  2-0 Vicryl for the subcutaneous tissues and 3-0 Monocryl for the skin   A dressing was applied   The Patient was repositioned supine on a hospital bed and taken to the postop recovery area stable condition       All needle and sponge counts were correct at the end of the procedure      I was present for the entire procedure, A qualified resident physician was not available and A physician assistant was required during the procedure for retraction tissue handling,dissection and suturing    Patient Disposition:  PACU     SIGNATURE: Gem Maloney MD  DATE: February 13, 2019  TIME: 12:18 PM

## 2019-02-13 NOTE — ASSESSMENT & PLAN NOTE
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

## 2019-02-13 NOTE — SOCIAL WORK
CM introduced self to pt and explained role at the bedside  Pt lives with her "family" in a [facility] with 3 marah and a full flight of stairsup to bedroom and full bathroom with no grab bars  Pt's emergency contact is Drake Bryant [phone]  PTA pt was independent with ADL's and ambulation; no DME available  PCP is Dr Taryn Luke and preferred pharmacy is [facility] on [address]  Pt denies hx etoh/drug abuse or tx and no mental health dx  Pt is employed and able to drive self  No hx VNA/HHC or IP Rehab  CM reviewed d/c planning process including the following: identifying help at home, patient preference for d/c planning needs, Discharge Lounge, [facility] Meds to Bed program, availability of treatment team to discuss questions or concerns patient and/or family may have regarding understanding medications and recognizing signs and symptoms once discharged  CM also encouraged patient to follow up with all recommended appointments after discharge  Patient advised of importance for patient and family to participate in managing patients medical well being        Anali Rome,  [phone]

## 2019-02-13 NOTE — UTILIZATION REVIEW
Notification of Inpatient Admission/Inpatient Authorization Request  This is a Notification of Inpatient Admission/Request for Inpatient Authorization for our facility [place] [address]  Be advised that this patient was admitted to our facility under Inpatient Status  Please contact the Utilization Review Department where the patient is receiving care services for additional admission information  Place of Service Code: 24   Place of Service Name: Inpatient Hospital  Presentation Date & Time: 2/10/2019  1:55 PM  Inpatient Admission Date & Time: 2/12/19 1133  Discharge Date & Time: No discharge date for patient encounter  Discharge Disposition (if discharged): Home/Self Care  Attending Physician: Elina Campos [[id number]]  Admission Orders (From admission, onward)    Ordered        02/12/19 1132  Inpatient Admission  Once     Order ID Start Status   [id number] 02/12/19 1133 Completed          02/10/19 2121  Place in Observation (expected length of stay for this patient is less than two midnights)  Once     Order ID Start Status   [id number] 02/10/19 2122 Completed              Facility: [address] Utilization Review Department  Phone: [phone] Genevieve Anders; Fax [phone]    Send all requests for admission clinical reviews, approved or denied determinations and any other requests to fax [phone]   All voicemails are confidential

## 2019-02-13 NOTE — PROGRESS NOTES
Progress Note - Carlos Labrum 1990, 33 y o  female MRN: [id number]    Unit/Bed#: OR POOL Encounter: [id number]    Primary Care Provider: DAMION Madden   Date and time admitted to hospital: 2/10/2019  1:55 PM        * Back pain of lumbosacral region with radiculopathy  Assessment & Plan  · Secondary to disc herniation/bulge at L5-S1  Weakness noted in proximal left lower extremity greater than right lower extremity likely secondary to pain inhibition  Sensation remains intact  · MRI lumbar spine without contrast 02/11/2019:   disc bulge with superimposed central/left paracentral disc extrusion at level L5-S1 with 8 millimeter cephalad migration and abutment of ascending S1 nerve root, left greater than right  This causes mild canal narrowing at this level  · CT abdomen pelvis with contrast 02/10/2019:  Prominent disc bulge at L5-S1 causing severe spinal canal narrowing  · Plan or today with Neurosurgery for decompression  · PT/OT when okay with Neurosurgery  · Optimize pain control- continue scheduled Tylenol, Neurontin and Lidoderm patch  · Continue Medrol Dosepak  · Bowel regimen and place  · Encourage incentive spirometry    Herniation of intervertebral disc between L5 and S1  Assessment & Plan  · Plan OR today with Neurosurgery  · Plan as below    Fluid in endometrial cavity  Assessment & Plan  · CT scan on admission- "Increased endometrial fluid and 3 cm left ovarian cyst, correlate with beta-hCG   If positive beta hCG, correlate with pelvic ultrasound "  · HCG obtained and negative     Ambulatory dysfunction  Assessment & Plan  · Out of bed with use of walker, optimize pain control to encourage ambulation   · Suspected secondary to back pain secondary to disc herniation at L5-S1 with radiculopathy  · Please note patient will need work no with restrictions 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[employer]      VTE Pharmacologic Prophylaxis:   Pharmacologic: Resume when okay with Neurosurgery  Mechanical VTE Prophylaxis in Place: Yes    Patient Centered Rounds: I have performed bedside rounds with nursing staff today  Discussions with Specialists or Other Care Team Provider:     Education and Discussions with Family / Patient:  Patient and  at bedside    Time Spent for Care: 30 minutes  More than 50% of total time spent on counseling and coordination of care as described above  Current Length of Stay: 1 day(s)    Current Patient Status: Inpatient   Certification Statement: The patient will continue to require additional inpatient hospital stay due to Plan for OR today per Neurosurgery     Discharge Plan:  Unclear discharge Plan at this time will need to undergo PT and OT eval to see if she would require rehab postprocedure    Code Status: Level 1 - Full Code      Subjective:   Reports pain is controlled as long she is at rest   No chest pain shortness of breath no calf tenderness  She reports a cough that is nonproductive  Voiding without difficulty  NPO this morning  No headache or dizziness at rest     Objective:     Vitals:   Temp (24hrs), Av 5 °F (36 9 °C), Min:97 7 °F (36 5 °C), Max:99 4 °F (37 4 °C)    Temp:  [97 7 °F (36 5 °C)-99 4 °F (37 4 °C)] 98 8 °F (37 1 °C)  HR:  [64-93] 66  Resp:  [12-18] 12  BP: (101-132)/(58-79) 115/71  SpO2:  [96 %-98 %] 98 %  Body mass index is 34 34 kg/m²  Input and Output Summary (last 24 hours): Intake/Output Summary (Last 24 hours) at 2019 1336  Last data filed at 2019 1220  Gross per 24 hour   Intake 1100 ml   Output 25 ml   Net 1075 ml       Physical Exam:     Physical Exam   Constitutional: She is oriented to person, place, and time  She appears well-developed and well-nourished  No distress  HENT:   Head: Normocephalic and atraumatic  Mouth/Throat: Oropharynx is clear and moist    Neck: Normal range of motion  Neck supple     Cardiovascular: Normal rate, regular rhythm, normal heart sounds and intact distal pulses  No murmur heard  Pulmonary/Chest: Breath sounds normal  No respiratory distress  Abdominal: Soft  Bowel sounds are normal  She exhibits no distension  There is no tenderness  Musculoskeletal: Normal range of motion  She exhibits no edema  Neurological: She is alert and oriented to person, place, and time  No cranial nerve deficit  Skin: Skin is warm and dry  Capillary refill takes less than 2 seconds  No rash noted  Psychiatric: She has a normal mood and affect  Her behavior is normal    Vitals reviewed  Additional Data:     Labs:    Results from last 7 days   Lab Units 02/11/19  0548   WBC Thousand/uL 17 32*   HEMOGLOBIN g/dL 13 5   HEMATOCRIT % 41 0   PLATELETS Thousands/uL 213   NEUTROS PCT % 79*   LYMPHS PCT % 13*   MONOS PCT % 7   EOS PCT % 0     Results from last 7 days   Lab Units 02/11/19  0548 02/10/19  1656   POTASSIUM mmol/L 3 8 4 2   CHLORIDE mmol/L 108 105   CO2 mmol/L 26 28   BUN mg/dL 12 13   CREATININE mg/dL 0 72 0 83   CALCIUM mg/dL 8 7 8 9   ALK PHOS U/L  --  54   ALT U/L  --  29   AST U/L  --  21     Results from last 7 days   Lab Units 02/11/19  0023   INR  1 02       * I Have Reviewed All Lab Data Listed Above  * Additional Pertinent Lab Tests Reviewed:  [facility] 65 Admission Reviewed    Imaging:    Imaging Reports Reviewed Today Include:  MRI  Imaging Personally Reviewed by Myself Includes:  None    Recent Cultures (last 7 days):           Last 24 Hours Medication List:     Current Facility-Administered Medications:  acetaminophen 975 mg Oral Q8H [address] Abisai Galo MD    baclofen 10 mg Oral BID PRN Abisai Galo MD    benzonatate 100 mg Oral TID PRANNA Galo MD    docusate sodium 100 mg Oral BID PRN Abisai Galo MD    fentaNYL 50 mcg Intravenous Q10 Min PRN Binh Zuniga CRNA    gabapentin 100 mg Oral TID Abisai Galo MD    HYDROmorphone 0 2 mg Intravenous Q10 Min PRANNA Zuniga CRNA    HYDROmorphone 0 5 mg Intravenous Q4H PRN Carrie Lang MD    ibuprofen 600 mg Oral Q8H PRN Carrie Lang MD    lactated ringers 100 mL/hr Intravenous Continuous Carrie Lang MD    lidocaine 1 patch Topical Daily Carrie Lang MD    [START ON 2/14/2019] methylPREDNISolone 16 mg Oral Daily Carrie Lang MD    Followed by        Ofe Hart ON 2/15/2019] methylPREDNISolone 12 mg Oral Daily Carrie Lang MD    Followed by        Ofe Hart ON 2/16/2019] methylPREDNISolone 8 mg Oral Daily Carrie Lang MD    Followed by        Ofe Hart ON 2/17/2019] methylPREDNISolone 4 mg Oral Daily Carrie Lang MD    metoclopramide 10 mg Intravenous Once PRN Ministerio Hawthorne CRNA    ondansetron 4 mg Intravenous Q6H PRN Carrie Lang MD    oxyCODONE 10 mg Oral Q4H PRN Carrie Lang MD    oxyCODONE 5 mg Oral Q4H PRN Carrie Lang MD    pantoprazole 40 mg Oral Early Morning Carrie Lang MD    sodium chloride 125 mL/hr Intravenous Continuous Carrie Lang MD Last Rate: 125 mL/hr (02/12/19 2312)   vancomycin 15 mg/kg Intravenous Q12H Carrie Lang MD         Today, Patient Was Seen By: DAMION Rodriges    ** Please Note: Dictation voice to text software may have been used in the creation of this document   **

## 2019-02-14 PROBLEM — R19.7 DIARRHEA: Status: RESOLVED | Noted: 2018-11-20 | Resolved: 2019-02-14

## 2019-02-14 PROCEDURE — 99232 SBSQ HOSP IP/OBS MODERATE 35: CPT | Performed by: NURSE PRACTITIONER

## 2019-02-14 PROCEDURE — 99024 POSTOP FOLLOW-UP VISIT: CPT | Performed by: PHYSICIAN ASSISTANT

## 2019-02-14 RX ORDER — OXYCODONE HYDROCHLORIDE 5 MG/1
TABLET ORAL
Qty: 45 TABLET | Refills: 0 | Status: SHIPPED | OUTPATIENT
Start: 2019-02-14 | End: 2019-02-25 | Stop reason: SDUPTHER

## 2019-02-14 RX ADMIN — METHYLPREDNISOLONE 16 MG: 16 TABLET ORAL at 09:35

## 2019-02-14 RX ADMIN — GABAPENTIN 100 MG: 100 CAPSULE ORAL at 18:33

## 2019-02-14 RX ADMIN — ACETAMINOPHEN 975 MG: 325 TABLET, FILM COATED ORAL at 13:27

## 2019-02-14 RX ADMIN — GABAPENTIN 100 MG: 100 CAPSULE ORAL at 09:35

## 2019-02-14 RX ADMIN — OXYCODONE HYDROCHLORIDE 10 MG: 10 TABLET ORAL at 01:13

## 2019-02-14 RX ADMIN — ACETAMINOPHEN 975 MG: 325 TABLET, FILM COATED ORAL at 05:28

## 2019-02-14 RX ADMIN — SODIUM CHLORIDE 125 ML/HR: 0.9 INJECTION, SOLUTION INTRAVENOUS at 02:37

## 2019-02-14 RX ADMIN — BACLOFEN 10 MG: 10 TABLET ORAL at 09:38

## 2019-02-14 RX ADMIN — OXYCODONE HYDROCHLORIDE 10 MG: 10 TABLET ORAL at 05:28

## 2019-02-14 RX ADMIN — ACETAMINOPHEN 975 MG: 325 TABLET, FILM COATED ORAL at 22:18

## 2019-02-14 RX ADMIN — LIDOCAINE 1 PATCH: 50 PATCH CUTANEOUS at 09:34

## 2019-02-14 RX ADMIN — GABAPENTIN 100 MG: 100 CAPSULE ORAL at 22:17

## 2019-02-14 RX ADMIN — DOCUSATE SODIUM 100 MG: 100 CAPSULE, LIQUID FILLED ORAL at 13:27

## 2019-02-14 RX ADMIN — OXYCODONE HYDROCHLORIDE 10 MG: 10 TABLET ORAL at 18:32

## 2019-02-14 RX ADMIN — PANTOPRAZOLE SODIUM 40 MG: 40 TABLET, DELAYED RELEASE ORAL at 05:28

## 2019-02-14 RX ADMIN — OXYCODONE HYDROCHLORIDE 10 MG: 10 TABLET ORAL at 13:27

## 2019-02-14 RX ADMIN — DOCUSATE SODIUM 100 MG: 100 CAPSULE, LIQUID FILLED ORAL at 22:17

## 2019-02-14 NOTE — DISCHARGE INSTRUCTIONS
Discharge Instructions  Lumbar decompression  (laminectomy/laminotomy/foraminotomy/discectomy)      Surgical incisional care:   Maintain dressing in place for 3 days  On postoperative day 3, dressing may be removed and incision may be left open to air   Keep incision clean and dry  Avoid applying creams, lotion or antiseptic to incision area   Check the wound daily  If the incision becomes red, swollen, tender, warm, or has increased drainage please notify physician immediately   If steri-strips are in place, allow them to fall off  If still in place at two week postoperative visit, we will remove them   May shower 3 days after surgery, but do not soak in a tub and no swimming   o Incision may be cleaned with water and a mild antimicrobial soap using a clean washcloth  Incision is to be gently patted dry with a clean towel   Continue to change bed linens and pajamas more frequently  Wear clean clothes daily  Activity Restrictions:   No heavy lifting greater than 10lbs and no strenuous activities until cleared   No bending or twisting  No BLTs (bending, lifting, twisting)  Avoid pushing/pulling movements   May walk as tolerated  Encourage at least 4 short walks per day   No driving for at least 2 weeks or until cleared by physician  Postoperative medication:   Take pain medications to relieve incision pain, and muscle relaxants to prevent spasms as directed  Please see after visit summary (AVS) for details   Do not operate heavy machinery or vehicles while taking sedating medications   Use a bowel regimen while on opioids as they induce constipation  Ie  Senokot-S, Miralax, Colace, etc  Increase fiber and water intake   Do not take ibuprofen, Naproxen/Aleve or any NSAID until cleared by surgeon  May take Tylenol instead   If taking Coumadin, Aspirin, or Plavix, you may resume these medications when cleared by Neurosurgery  Follow-up as scheduled for a 2 week incision check  Follow-up as scheduled for 6 week postoperative visit       **Please notify MD if you experience a fever 101F, chills or have increased pain, numbness, tingling, or weakness in your legs and/or bowel/bladder dysfunction/incontinence**

## 2019-02-14 NOTE — PROGRESS NOTES
Pt has new clear drainage surrounding the old bloody drainage on her mid lower back incision area  Uncertain if it is CSF  Paged neurosurgery resident because pt is currently feeling pressure and pain  Jenise KOVACS called back and stated that she will come up to evaluate patient  Will continue to monitor throughout shift

## 2019-02-14 NOTE — PROGRESS NOTES
Brief progress note  Called to evaluate patient because of drainage from low back incision  Her nurse was unclear if it was CSF fluid  At this time patient is complaining of pain at 9/10 which is unchanged since immediately postop  She denies any headache, nausea, vomiting, or visual changes  Patient denies neck pain and only complains of pain localized to the incision  She also has some mild pain at both hips which could be positional from surgery  Patient denies chest pain or shortness of breath  Patient did get up to go to the bathroom with assistance  Examination of the back reveals the dressing to be in place with minimal serosanguineous drainage along the middle of the small dressing with clearer drainage along the edges distally of the dressing  There is no hematoma or swelling or area of fluctuance  Patient is neurologically intact  Will continue to watch patient closely and keep her in bed until evaluated by Neurosurgery in the morning  Will watch for any symptoms of headache as above      Gokul Scott PA-C

## 2019-02-14 NOTE — ASSESSMENT & PLAN NOTE
· Postop pain management regimen and place  · Will plan for Tylenol p r n  At time of discharge along with 5 mg of Oxy for moderate pain and 10 mg of Oxy for severe pain in addition to muscle relaxant as per discussion with Neurosurgery

## 2019-02-14 NOTE — PROGRESS NOTES
Pt was seen by Keenan Boeck PA for neurosurgery  Advised to keep patient laying flat in bed and on bedrest until seen by neurosurgery day team   Advised that the fluid is not CSF but that it would be best for patient to be on bedrest to avoid further drainage  Explained to patient and she verbalized understanding  Will continue to monitor throughout shift

## 2019-02-14 NOTE — ASSESSMENT & PLAN NOTE
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

## 2019-02-14 NOTE — PROGRESS NOTES
Progress Note - Jasson Garcia 1990, 33 y o  female MRN: [id number]    Unit/Bed#: CW2 217-03 Encounter: [id number]    Primary Care Provider: DAMION Lawson   Date and time admitted to hospital: 2/10/2019  1:55 PM        * Back pain of lumbosacral region with radiculopathy  Assessment & Plan  · Secondary to disc herniation/bulge at L5-S1  Weakness noted in proximal left lower extremity greater than right lower extremity likely secondary to pain inhibition  Sensation remains intact  · MRI lumbar spine without contrast 02/11/2019:   disc bulge with superimposed central/left paracentral disc extrusion at level L5-S1 with 8 millimeter cephalad migration and abutment of ascending S1 nerve root, left greater than right  This causes mild canal narrowing at this level  · CT abdomen pelvis with contrast 02/10/2019:  Prominent disc bulge at L5-S1 causing severe spinal canal narrowing  · POD#1, status post left lumbar laminotomy/diskectomy L5-S1  · PT to assess for ambulatory status and needs at time of discharge  · Continue current pain regimen of scheduled Tylenol, Neurontin and Lidoderm patch, along with p r n  Oxy IR and baclofen  · Continue Medrol Dosepak  · Bowel regimen and place  · Encourage incentive spirometry  · Discussed with nursing to encourage patient use and also reviewed with patient use of incentive spirometer and the importance of doing these exercises in regards to prevent doing atelectasis and possible pneumonia  Patient states she did have a cough prior to her surgical procedure which had been lingering since she had the flu approximately 3 weeks ago  Herniation of intervertebral disc between L5 and S1  Assessment & Plan  · Status post lumbar laminotomy/diskectomy L5-S1 left  · Plan as below    Fluid in endometrial cavity  Assessment & Plan  · CT scan on admission- "Increased endometrial fluid and 3 cm left ovarian cyst, correlate with beta-hCG   If positive beta hCG, correlate with pelvic ultrasound "  · HCG obtained and negative     Lumbosacral radiculopathy  Assessment & Plan  · Postop pain management regimen and place  · Will plan for Tylenol p r n  At time of discharge along with 5 mg of Oxy for moderate pain and 10 mg of Oxy for severe pain in addition to muscle relaxant as per discussion with Neurosurgery  Ambulatory dysfunction  Assessment & Plan  · PT consult pending, for needs at time of discharge  Patient does have to climb 15 stairs to get to the bathroom and bedroom  · Present on admission secondary to disc herniation at L5-S1 with radiculopathy and complicated by pain   · Please note patient will need work no with restrictions 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[employer], 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 to have completed      VTE Pharmacologic Prophylaxis:   Pharmacologic: Encourage ambulation status post surgery can resume if cleared by surgery  Mechanical VTE Prophylaxis in Place: Yes    Patient Centered Rounds: I have performed bedside rounds with nursing staff today  Discussions with Specialists or Other Care Team Provider:  Neurosurgery    Education and Discussions with Family / Patient:  Patient and  at bedside    Time Spent for Care: 30 minutes  More than 50% of total time spent on counseling and coordination of care as described above      Current Length of Stay: 2 day(s)    Current Patient Status: Inpatient   Certification Statement: The patient will continue to require additional inpatient hospital stay due to Uncontrolled pain and need for assessment by PT for needs at discharge at this time she would be an unsafe discharge secondary to pain and need to work with PT to see if she can clear 15 steps    Discharge Plan:  Home when medically stable will likely need another 24 hours    Code Status: Level 1 - Full Code      Subjective:   Reports pain at surgical site and continues to report some decreased sensation on the left lower extremity  Has a cough of which she states has been lingering since she had the flu 3 weeks ago  No chest pain or palpitations  No shortness of breath  No headache or dizziness  Tolerating diet voiding on bedpan    Objective:     Vitals:   Temp (24hrs), Av 5 °F (36 9 °C), Min:98 3 °F (36 8 °C), Max:98 8 °F (37 1 °C)    Temp:  [98 3 °F (36 8 °C)-98 8 °F (37 1 °C)] 98 6 °F (37 °C)  HR:  [60-85] 85  Resp:  [12-18] 18  BP: (108-125)/(58-80) 114/71  SpO2:  [96 %-98 %] 98 %  Body mass index is 34 34 kg/m²  Input and Output Summary (last 24 hours): Intake/Output Summary (Last 24 hours) at 2019 1054  Last data filed at 2019 0901  Gross per 24 hour   Intake 1287 ml   Output 3225 ml   Net -1938 ml       Physical Exam:     Physical Exam   Constitutional: She is oriented to person, place, and time  She appears well-developed and well-nourished  No distress  HENT:   Head: Normocephalic and atraumatic  Mouth/Throat: Oropharynx is clear and moist    Eyes: Pupils are equal, round, and reactive to light  Neck: Normal range of motion  Neck supple  Cardiovascular: Normal rate, regular rhythm, normal heart sounds and intact distal pulses  No murmur heard  Pulmonary/Chest: Effort normal and breath sounds normal  No respiratory distress  Cough noted nonproductive   Abdominal: Soft  Bowel sounds are normal  She exhibits no distension  There is no tenderness  Musculoskeletal: Normal range of motion  She exhibits no edema  Range of motion is painful with patient can move all extremities equally   Neurological: She is alert and oriented to person, place, and time  Decreased tactile sensation left lower extremity   Skin: Skin is warm and dry  Psychiatric: She has a normal mood and affect  Her behavior is normal    Vitals reviewed        Additional Data:     Labs:    Results from last 7 days   Lab Units 19  0548   WBC Thousand/uL 17 32* HEMOGLOBIN g/dL 13 5   HEMATOCRIT % 41 0   PLATELETS Thousands/uL 213   NEUTROS PCT % 79*   LYMPHS PCT % 13*   MONOS PCT % 7   EOS PCT % 0     Results from last 7 days   Lab Units 02/11/19  0548 02/10/19  1656   POTASSIUM mmol/L 3 8 4 2   CHLORIDE mmol/L 108 105   CO2 mmol/L 26 28   BUN mg/dL 12 13   CREATININE mg/dL 0 72 0 83   CALCIUM mg/dL 8 7 8 9   ALK PHOS U/L  --  54   ALT U/L  --  29   AST U/L  --  21     Results from last 7 days   Lab Units 02/11/19  0023   INR  1 02       * I Have Reviewed All Lab Data Listed Above  * Additional Pertinent Lab Tests Reviewed:  All Labs Within Last 24 Hours Reviewed    Imaging:    Imaging Reports Reviewed Today Include:  None  Imaging Personally Reviewed by Myself Includes:  None    Recent Cultures (last 7 days):           Last 24 Hours Medication List:     Current Facility-Administered Medications:  acetaminophen 975 mg Oral Q8H [facility] Carrie Lang MD    baclofen 10 mg Oral BID PRN Carrie Lang MD    benzonatate 100 mg Oral TID PRN Carrie Lang MD    docusate sodium 100 mg Oral BID PRN Carrie Lang MD    gabapentin 100 mg Oral TID Carrie Lang MD    HYDROmorphone 0 5 mg Intravenous Q4H PRN Carrie Lang MD    ibuprofen 600 mg Oral Q8H PRN Carrie Lang MD    lactated ringers 100 mL/hr Intravenous Continuous Carrie Lang MD Last Rate: Stopped (02/14/19 0607)   lidocaine 1 patch Topical Daily Carrie Lang MD    [START ON 2/15/2019] methylPREDNISolone 12 mg Oral Daily Carrie Lnag MD    Followed by        Ofe Hart ON 2/16/2019] methylPREDNISolone 8 mg Oral Daily Carrie Lang MD    Followed by        Ofe Hart ON 2/17/2019] methylPREDNISolone 4 mg Oral Daily Carrie Lang MD    ondansetron 4 mg Intravenous Q6H PRN Carrie Lang MD    oxyCODONE 10 mg Oral Q4H PRN Carrie Lang MD    oxyCODONE 5 mg Oral Q4H PRN Carrie Lang MD    pantoprazole 40 mg Oral Early Morning Carrie Lang MD    sodium chloride 125 mL/hr Intravenous Continuous Carrie Lang MD Last Rate: Stopped (02/14/19 1123) Today, Patient Was Seen By: DAMION Castro    ** Please Note: Dictation voice to text software may have been used in the creation of this document   **

## 2019-02-14 NOTE — ASSESSMENT & PLAN NOTE
· Secondary to disc herniation/bulge at L5-S1  Weakness noted in proximal left lower extremity greater than right lower extremity likely secondary to pain inhibition  Sensation remains intact  · MRI lumbar spine without contrast 02/11/2019:   disc bulge with superimposed central/left paracentral disc extrusion at level L5-S1 with 8 millimeter cephalad migration and abutment of ascending S1 nerve root, left greater than right  This causes mild canal narrowing at this level  · CT abdomen pelvis with contrast 02/10/2019:  Prominent disc bulge at L5-S1 causing severe spinal canal narrowing  · POD#1, status post left lumbar laminotomy/diskectomy L5-S1  · PT to assess for ambulatory status and needs at time of discharge  · Continue current pain regimen of scheduled Tylenol, Neurontin and Lidoderm patch, along with p r n  Oxy IR and baclofen  · Continue Medrol Dosepak  · Bowel regimen and place  · Encourage incentive spirometry  · Discussed with nursing to encourage patient use and also reviewed with patient use of incentive spirometer and the importance of doing these exercises in regards to prevent doing atelectasis and possible pneumonia  Patient states she did have a cough prior to her surgical procedure which had been lingering since she had the flu approximately 3 weeks ago

## 2019-02-14 NOTE — RESTORATIVE TECHNICIAN NOTE
Restorative Specialist Mobility Note       Activity: Stand at bedside, Turn, Dangle, Ambulate in beck(Pt left supine in bed with call bell, phone, and tray within reach )     Assistive Device: Front wheel walker, Other (Comment)(Pt took 3 seated rest breaks during ambulation )        Repositioned: Supine              Anti-Embolism Device On:  Bilateral, Sequential compression devices, below knee

## 2019-02-15 ENCOUNTER — TELEPHONE (OUTPATIENT)
Dept: NEUROSURGERY | Facility: CLINIC | Age: 29
End: 2019-02-15

## 2019-02-15 PROCEDURE — G8978 MOBILITY CURRENT STATUS: HCPCS

## 2019-02-15 PROCEDURE — 99232 SBSQ HOSP IP/OBS MODERATE 35: CPT | Performed by: NURSE PRACTITIONER

## 2019-02-15 PROCEDURE — G8979 MOBILITY GOAL STATUS: HCPCS

## 2019-02-15 PROCEDURE — 97530 THERAPEUTIC ACTIVITIES: CPT

## 2019-02-15 PROCEDURE — 97163 PT EVAL HIGH COMPLEX 45 MIN: CPT

## 2019-02-15 RX ADMIN — ACETAMINOPHEN 975 MG: 325 TABLET, FILM COATED ORAL at 15:25

## 2019-02-15 RX ADMIN — ENOXAPARIN SODIUM 40 MG: 40 INJECTION SUBCUTANEOUS at 15:25

## 2019-02-15 RX ADMIN — OXYCODONE HYDROCHLORIDE 10 MG: 10 TABLET ORAL at 09:20

## 2019-02-15 RX ADMIN — OXYCODONE HYDROCHLORIDE 10 MG: 10 TABLET ORAL at 00:03

## 2019-02-15 RX ADMIN — METHYLPREDNISOLONE 12 MG: 4 TABLET ORAL at 09:20

## 2019-02-15 RX ADMIN — GABAPENTIN 100 MG: 100 CAPSULE ORAL at 21:46

## 2019-02-15 RX ADMIN — ACETAMINOPHEN 975 MG: 325 TABLET, FILM COATED ORAL at 21:46

## 2019-02-15 RX ADMIN — OXYCODONE HYDROCHLORIDE 10 MG: 10 TABLET ORAL at 21:50

## 2019-02-15 RX ADMIN — GABAPENTIN 100 MG: 100 CAPSULE ORAL at 09:20

## 2019-02-15 RX ADMIN — GABAPENTIN 100 MG: 100 CAPSULE ORAL at 15:25

## 2019-02-15 RX ADMIN — LIDOCAINE 1 PATCH: 50 PATCH CUTANEOUS at 09:20

## 2019-02-15 RX ADMIN — ACETAMINOPHEN 975 MG: 325 TABLET, FILM COATED ORAL at 05:36

## 2019-02-15 RX ADMIN — PANTOPRAZOLE SODIUM 40 MG: 40 TABLET, DELAYED RELEASE ORAL at 05:36

## 2019-02-15 RX ADMIN — OXYCODONE HYDROCHLORIDE 5 MG: 5 TABLET ORAL at 05:36

## 2019-02-15 NOTE — ASSESSMENT & PLAN NOTE
· Secondary to disc herniation/bulge at L5-S1  Weakness noted in proximal left lower extremity greater than right lower extremity likely secondary to pain inhibition improved postoperatively  Sensation remains intact  · MRI lumbar spine without contrast 02/11/2019:   disc bulge with superimposed central/left paracentral disc extrusion at level L5-S1 with 8 millimeter cephalad migration and abutment of ascending S1 nerve root, left greater than right  This causes mild canal narrowing at this level  · CT abdomen pelvis with contrast 02/10/2019:  Prominent disc bulge at L5-S1 causing severe spinal canal narrowing  · POD#2, status post left lumbar laminotomy/diskectomy L5-S1  · PT eval indicates patient is not medically stable to go home at this time  Will re-evaluate in a m  · Continue current pain regimen of scheduled Tylenol, Neurontin and Lidoderm patch, along with p r n   Oxy IR and baclofen  · Continue Medrol Dosepak  · Bowel regimen in place  · Encourage incentive spirometry, patient has cough every time she takes a deep breath reinforce the importance of incentive spirometry and coughing and deep breathing

## 2019-02-15 NOTE — PLAN OF CARE
Problem: PHYSICAL THERAPY ADULT  Goal: Performs mobility at highest level of function for planned discharge setting  See evaluation for individualized goals  Description  Treatment/Interventions: Functional transfer training, LE strengthening/ROM, Elevations, Therapeutic exercise, Endurance training, Patient/family training, Equipment eval/education, Bed mobility, Gait training, Spoke to nursing, Family  Equipment Recommended: Brandon Chapman (Comment)(commode )       See flowsheet documentation for full assessment, interventions and recommendations  Note:   Prognosis: Good  Problem List: Decreased strength, Decreased endurance, Impaired balance, Decreased mobility, Pain, Orthopedic restrictions  Assessment: Pt is 33 y o  female seen for PT evaluation s/p admit to [facility] on 2/10/2019  Two pt identifiers were used to confirm  Pt presented w/ back pain of lumbosacral region with radiculopathy  Pt underwent LUMBAR LAMINOTOMY/DISCECTOMY L5-S1, LEFT (Left) which was performed on 2/13/2019  Pt was admitted with a primary dx of: back pain of lumbosacral region with radiculopathy  PT now consulted for assessment of mobility and d/c needs  Pt with Up with assistance orders  Pts current co morbidities effecting treatment include: GERD, obesity, and personal factors including ROXANA home and steps to manage at home  Pts current clinical presentation is Unstable/ Unpredictable (high complexity) due to Ongoing medical management for primary dx, Increased reliance on more restrictive AD compared to baseline, Decreased activity tolerance compared to baseline, Fall risk, Increased assistance needed from caregiver at current time    Prior to admission, pt was I with ambulation without the use of an AD as per pt  Upon evaluation, pt currently is requiring min a for bed mobility; min A for transfers and min A for ambulation w/ RW   Pt denies any lightheadedness or dizziness with ambulation   Pt presents at PT eval functioning below baseline and currently w/ overall mobility deficits 2* to: BLE weakness, impaired balance, decreased endurance, gait deviations, pain, decreased activity tolerance compared to baseline, fall risk, orthopedic restrictions  Pt currently at a fall risk 2* to impairments listed above  Based on the aforementioned PT evaluation, pt will continue to benefit from skilled Acute PT interventions to address stated impairments; to maximize functional mobility; for ongoing pt/ family training; and DME needs  At conclusion of PT session pt returned BTB with phone and call bell within reach  Pt denies any further questions at this time  PT is currently recommending home PT  Pt/ family agreeable to plan and goals as stated on evaluation  PT will continue to follow during hospital stay  Barriers to Discharge: Inaccessible home environment  Barriers to Discharge Comments: ROXANA home, steps to second level   Recommendation: Home PT     PT - OK to Discharge: No(increase ambulation distances and attempt addtional stairs )    See flowsheet documentation for full assessment

## 2019-02-15 NOTE — ASSESSMENT & PLAN NOTE
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

## 2019-02-15 NOTE — PHYSICAL THERAPY NOTE
PHYSICAL THERAPY EVALUATION  NAME:  Sujit Rogers  DATE: 02/15/19    AGE:   33 y o   Mrn:   [id number]  ADMIT DX:  Lumbosacral radiculopathy [M54 17]  Back pain [M54 9]  Spinal cord compression (HCC) [G95 20]  Abdominal pain [R10 9]  Herniation of intervertebral disc between L5 and S1 [M51 27]    Past Medical History:   Diagnosis Date    GERD (gastroesophageal reflux disease)     Obesity        Past Surgical History:   Procedure Laterality Date    HERNIA REPAIR      POSTERIOR LAMINECTOMY THORACIC AND LUMBAR SPINE Left 2/13/2019    Procedure: LUMBAR LAMINOTOMY/DISCECTOMY L5-S1, LEFT;  Surgeon: Rodríguez Moore MD;  [facility];  Service: Neurosurgery    NJ COLONOSCOPY FLX DX W/COLLJ SPEC WHEN PFRMD N/A 12/20/2018    Procedure: COLONOSCOPY;  Surgeon: Dick Holland MD;  [facility]; Service: Gastroenterology    NJ ESOPHAGOGASTRODUODENOSCOPY TRANSORAL DIAGNOSTIC N/A 12/20/2018    Procedure: ESOPHAGOGASTRODUODENOSCOPY (EGD); Surgeon: Dick Holland MD;  [facility]; Service: Gastroenterology    TUBAL LIGATION         Length Of Stay: 3    PHYSICAL THERAPY EVALUATION:        02/15/19 1005   Note Type   Note type Eval only   Pain Assessment   Pain Assessment 0-10   Pain Score 8   Pain Type Surgical pain   Pain Location Back   Pain Descriptors Aching   Pain Frequency Constant/continuous   Pain Onset Ongoing   Clinical Progression Gradually improving   Effect of Pain on Daily Activities increased pain with all activity    Patient's Stated Pain Goal No pain   Hospital Pain Intervention(s) Ambulation/increased activity;Repositioned   Response to Interventions tolerated    Home Living   Type of [address] [place] [address] Two level;Stairs to enter with rails; Able to live on main level with bedroom/bathroom  (3 ROXANA )   Home Equipment   (none as per pt )   Additional Comments Pt reports living with  who is able to assist pt if needed, however pt will be home alone while pt is at work  Pts Mother will be staying with pt and  starting sunday 2/17   Prior Function   Level of Brandon Independent with ADLs and functional mobility   Lives With Spouse   Receives Help From Friend(s); Family   ADL Assistance Independent   Falls in the last 6 months 0   Comments Pt denies the use of an AD for ambulation PTA    Restrictions/Precautions   Weight Bearing Precautions Per Order No   Braces or Orthoses   (none as per neuro sx )   Other Precautions Fall Risk;Pain;Spinal precautions   General   Family/Caregiver Present Yes   Cognition   Overall Cognitive Status WFL   Arousal/Participation Alert   Orientation Level Oriented X4   Memory Within functional limits   Following Commands Follows one step commands without difficulty   RUE Assessment   RUE Assessment WFL   LUE Assessment   LUE Assessment WFL   RLE Assessment   RLE Assessment WFL   Strength RLE   RLE Overall Strength 4-/5   LLE Assessment   LLE Assessment WFL   Strength LLE   LLE Overall Strength 4-/5   Bed Mobility   Supine to Sit 4  Minimal assistance   Additional items Assist x 1; Increased time required;Verbal cues   Sit to Supine 4  Minimal assistance   Additional items Assist x 1; Increased time required;Verbal cues   Transfers   Sit to Stand 4  Minimal assistance   Additional items Assist x 1; Increased time required;Verbal cues   Stand to Sit 4  Minimal assistance   Additional items Assist x 1; Increased time required;Verbal cues   Additional Comments VC and TC needed for hand placement and safety    Ambulation/Elevation   Gait pattern Excessively slow; Short stride; Foward flexed; Inconsistent nicole;Decreased foot clearance   Gait Assistance 4  Minimal assist   Additional items Assist x 1   Assistive Device Rolling walker   Distance 35ft x 2   (multiple seated rest breaks required )   Balance   Static Sitting Fair   Static Standing Fair -   Ambulatory Poor +   Endurance Deficit   Endurance Deficit Yes   Endurance Deficit Description fatigue and pain    Activity Tolerance   Activity Tolerance Patient limited by fatigue;Patient limited by pain   Nurse Made Aware Pt appropriate to be seen and mobilize per nsg   Assessment   Prognosis Good   Problem List Decreased strength;Decreased endurance; Impaired balance;Decreased mobility;Pain;Orthopedic restrictions   Assessment Pt is 33 y o  female seen for PT evaluation s/p admit to [facility] on 2/10/2019  Two pt identifiers were used to confirm  Pt presented w/ back pain of lumbosacral region with radiculopathy  Pt underwent LUMBAR LAMINOTOMY/DISCECTOMY L5-S1, LEFT (Left) which was performed on 2/13/2019  Pt was admitted with a primary dx of: back pain of lumbosacral region with radiculopathy  PT now consulted for assessment of mobility and d/c needs  Pt with Up with assistance orders  Pts current co morbidities effecting treatment include: GERD, obesity, and personal factors including ROXANA home and steps to manage at home  Pts current clinical presentation is Unstable/ Unpredictable (high complexity) due to Ongoing medical management for primary dx, Increased reliance on more restrictive AD compared to baseline, Decreased activity tolerance compared to baseline, Fall risk, Increased assistance needed from caregiver at current time    Prior to admission, pt was I with ambulation without the use of an AD as per pt  Upon evaluation, pt currently is requiring min a for bed mobility; min A for transfers and min A for ambulation w/ RW   Pt denies any lightheadedness or dizziness with ambulation  Pt presents at PT eval functioning below baseline and currently w/ overall mobility deficits 2* to: BLE weakness, impaired balance, decreased endurance, gait deviations, pain, decreased activity tolerance compared to baseline, fall risk, orthopedic restrictions  Pt currently at a fall risk 2* to impairments listed above    Based on the aforementioned PT evaluation, pt will continue to benefit from skilled Acute PT interventions to address stated impairments; to maximize functional mobility; for ongoing pt/ family training; and DME needs  At conclusion of PT session pt returned BTB with phone and call bell within reach  Pt denies any further questions at this time  PT is currently recommending home PT  Pt/ family agreeable to plan and goals as stated on evaluation  PT will continue to follow during hospital stay  Barriers to Discharge Inaccessible home environment   Barriers to Discharge Comments ROXANA home, steps to second level    Goals   Patient Goals " to have less pain"   [facility] Expiration Date 02/25/19   Short Term Goal #1 In 10 days pt will complete: 1) Bed mobility skills with S to increase safety and independence as well as decrease caregiver burden  2) Functional transfers with S to promote increased independence, safety, and QOL in the home environment  3) Ambulate 150' using least restrictive AD with S without LOB and stable vitals so that pt can negotiate home environment safely and promote independence with functional mobility and return to PLOF  4) Stair training up/ down 10 step/s using rail/s with S so that pt can enter/negotiate home environment safely and decrease fall risk  5) Improve balance grades to Good to increase safety with all mobility and decrease fall risk  6) Improve BLE strength by 1/2 grade to help increase overall functional mobility and decrease fall risk  7) PT for ongoing pt and family education; DME needs and D/C planning to promote highest level of function in least restrictive environment  Treatment Day 1   Plan   Treatment/Interventions Functional transfer training;LE strengthening/ROM; Elevations; Therapeutic exercise; Endurance training;Patient/family training;Equipment eval/education; Bed mobility;Gait training;Spoke to nursing;Family   PT Frequency Other (Comment)  (3-6x a week )   Recommendation   Recommendation Home PT   Equipment Recommended Walker; Other (Comment)  (commode )   PT - OK to Discharge No  (increase ambulation distances and attempt addtional stairs )   Modified [place] Scale   Modified [place] Scale 4   Barthel Index   Feeding 10   Bathing 0   Grooming Score 5   Dressing Score 5   Bladder Score 10   Bowels Score 10   Toilet Use Score 5   Transfers (Bed/Chair) Score 5   Mobility (Level Surface) Score 0   Stairs Score 5   Barthel Index Score 55   Tx Note:  Time In: 950  Time Out:1005  Total Time:15min  S: Pt willing to participate in PT session post PT eval to attempt stair negotiation  O: Pt able to negotiate 3 steps with mod A x 1 and the use of a hand rail on the R  A: Pt agreeable to take part in PT treatment following evaluation to attempt stair negotiation  Pt educated on an able to perform 3 steps with mod A x 1 and the use of a hand rail on the R post PT eval  Pt demonstrated non reciprocal pattern with stairs  Pt unable to attempt > 3 steps this session due to pain and fatigue  Seated rest break needed post stair training  At conclusion of PT session pt returned BTB with all needs within reach  Pt denies any further questions at this time  PT will continue to follow   D/C recommendation when medically cleared is home PT   P: Continue with POC as outlined in pts initial evaluation    Sheeba Bird, PT

## 2019-02-15 NOTE — PROGRESS NOTES
Progress Note - Jesus Lowe 1990, 33 y o  female MRN: [id number]    Unit/Bed#: CW2 217-03 Encounter: [id number]    Primary Care Provider: DAMION Cruz   Date and time admitted to hospital: 2/10/2019  1:55 PM        * Back pain of lumbosacral region with radiculopathy  Assessment & Plan  · Secondary to disc herniation/bulge at L5-S1  Weakness noted in proximal left lower extremity greater than right lower extremity likely secondary to pain inhibition improved postoperatively  Sensation remains intact  · MRI lumbar spine without contrast 02/11/2019:   disc bulge with superimposed central/left paracentral disc extrusion at level L5-S1 with 8 millimeter cephalad migration and abutment of ascending S1 nerve root, left greater than right  This causes mild canal narrowing at this level  · CT abdomen pelvis with contrast 02/10/2019:  Prominent disc bulge at L5-S1 causing severe spinal canal narrowing  · POD#2, status post left lumbar laminotomy/diskectomy L5-S1  · PT eval indicates patient is not medically stable to go home at this time  Will re-evaluate in a m  · Continue current pain regimen of scheduled Tylenol, Neurontin and Lidoderm patch, along with p r n  Oxy IR and baclofen  · Continue Medrol Dosepak  · Bowel regimen in place  · Encourage incentive spirometry, patient has cough every time she takes a deep breath reinforce the importance of incentive spirometry and coughing and deep breathing    Herniation of intervertebral disc between L5 and S1  Assessment & Plan  · Status post lumbar laminotomy/diskectomy L5-S1 left  · Plan as below    Fluid in endometrial cavity  Assessment & Plan  · CT scan on admission- "Increased endometrial fluid and 3 cm left ovarian cyst, correlate with beta-hCG   If positive beta hCG, correlate with pelvic ultrasound "  · HCG obtained and negative     Lumbosacral radiculopathy  Assessment & Plan  · Postop pain management regimen in place  · Continue Tylenol p r n  At time of discharge along with 5 mg of Oxy for moderate pain and 10 mg of Oxy for severe pain in addition to muscle relaxant as per discussion with Neurosurgery  Ambulatory dysfunction  Assessment & Plan  · PT consult appreciated currently not cleared for discharge  Patient does have to climb 15 stairs to get to the bathroom and bedroom  · Present on admission secondary to disc herniation at L5-S1 with radiculopathy and complicated by pain   · Please note patient will need work no with restrictions 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[employer], 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 to have completed  · Spoke with Neurosurgery who stated that her FMLA papers are in their office and they will notify her when they are completed for her to pick them up  VTE Pharmacologic Prophylaxis:   Pharmacologic: Enoxaparin (Lovenox)  Mechanical VTE Prophylaxis in Place: Yes    Patient Centered Rounds: I have performed bedside rounds with nursing staff today  Discussions with Specialists or Other Care Team Provider:  Neurosurgery    Education and Discussions with Family / Patient:  Patient    Time Spent for Care: 30 minutes  More than 50% of total time spent on counseling and coordination of care as described above  Current Length of Stay: 3 day(s)    Current Patient Status: Inpatient   Certification Statement: The patient will continue to require additional inpatient hospital stay due to Due to continued ambulatory dysfunction she is not cleared by PT for discharge therefore she is an unsafe discharge at this time will re-evaluate in 24 more hours to assess discharge readiness if she is not progressing will consider rehab placement      Discharge Plan:  Likely home in the next 24 hr, if not cleared by PT may need rehab placement    Code Status: Level 1 - Full Code      Subjective:   Reports low pain at surgical site pain goes down lateral thighs but overall is improved  Physical therapy has worked with her but has not cleared her for discharge as she does need to work on steps  Tolerating diet no nausea vomiting  Cough is improving and she has been using her incentive spirometry  No chest pain shortness of breath    Objective:     Vitals:   Temp (24hrs), Av 8 °F (37 1 °C), Min:98 7 °F (37 1 °C), Max:98 8 °F (37 1 °C)    Temp:  [98 7 °F (37 1 °C)-98 8 °F (37 1 °C)] 98 8 °F (37 1 °C)  HR:  [83-88] 88  Resp:  [18] 18  BP: (109-124)/(69-77) 109/69  SpO2:  [96 %] 96 %  Body mass index is 34 34 kg/m²  Input and Output Summary (last 24 hours): Intake/Output Summary (Last 24 hours) at 2/15/2019 1439  Last data filed at 2/15/2019 1100  Gross per 24 hour   Intake 741 ml   Output 1100 ml   Net -359 ml       Physical Exam:     Physical Exam   Constitutional: She is oriented to person, place, and time  She appears well-developed and well-nourished  No distress  HENT:   Head: Normocephalic and atraumatic  Mouth/Throat: Oropharynx is clear and moist    Neck: Normal range of motion  Neck supple  Cardiovascular: Normal rate, regular rhythm and intact distal pulses  No murmur heard  Pulmonary/Chest: Effort normal  No respiratory distress  She has decreased breath sounds in the right lower field and the left lower field  She has no wheezes  She has no rhonchi  Abdominal: Soft  Bowel sounds are normal  She exhibits no distension  There is no tenderness  Musculoskeletal: Normal range of motion  She exhibits no edema or tenderness  Neurological: She is alert and oriented to person, place, and time  Skin: Skin is warm and dry  Capillary refill takes less than 2 seconds  Psychiatric: She has a normal mood and affect  Her behavior is normal    Vitals reviewed        Additional Data:     Labs:    Results from last 7 days   Lab Units 19  0548   WBC Thousand/uL 17 32*   HEMOGLOBIN g/dL 13 5   HEMATOCRIT % 41 0   PLATELETS Thousands/uL 213 NEUTROS PCT % 79*   LYMPHS PCT % 13*   MONOS PCT % 7   EOS PCT % 0     Results from last 7 days   Lab Units 02/11/19  0548 02/10/19  1656   POTASSIUM mmol/L 3 8 4 2   CHLORIDE mmol/L 108 105   CO2 mmol/L 26 28   BUN mg/dL 12 13   CREATININE mg/dL 0 72 0 83   CALCIUM mg/dL 8 7 8 9   ALK PHOS U/L  --  54   ALT U/L  --  29   AST U/L  --  21     Results from last 7 days   Lab Units 02/11/19  0023   INR  1 02       * I Have Reviewed All Lab Data Listed Above  * Additional Pertinent Lab Tests Reviewed: No New Labs Available For Today    Imaging:    Imaging Reports Reviewed Today Include:  None  Imaging Personally Reviewed by Myself Includes:  None    Recent Cultures (last 7 days):           Last 24 Hours Medication List:     Current Facility-Administered Medications:  acetaminophen 975 mg Oral Q8H [address] Higinio Amanda MD   baclofen 10 mg Oral BID PRN Higinio Amanda MD   benzonatate 100 mg Oral TID PRN Higinio Amanda MD   docusate sodium 100 mg Oral BID PRN Higinio Amanda MD   gabapentin 100 mg Oral TID Higinio Amanda MD   ibuprofen 600 mg Oral Q8H PRN Higinio Amanda MD   lidocaine 1 patch Topical Daily Higinio Amanda MD   [START ON 2/16/2019] methylPREDNISolone 8 mg Oral Daily Higinio Amanda MD   Followed by       Perla Pham ON 2/17/2019] methylPREDNISolone 4 mg Oral Daily Higinio Amanda MD   ondansetron 4 mg Intravenous Q6H PRN Higinio Amanda MD   oxyCODONE 10 mg Oral Q4H PRN Higinio Amanda MD   oxyCODONE 5 mg Oral Q4H PRN Higinio Amanda MD   pantoprazole 40 mg Oral Early Morning Higinio Amanda MD        Today, Patient Was Seen By: DAMION Novoa    ** Please Note: Dictation voice to text software may have been used in the creation of this document   **

## 2019-02-15 NOTE — TELEPHONE ENCOUNTER
02/18/2019-PT DISCHARGED HOME 02/28/2019-2WK POV  03/22/2019-6WK POV    02/15/2019-VALERIE(02/14/2019) SIGN-OFF  FOLLOW-UP FOR 2 WK INCISION CHECK AND 6 WK POV WITH DR OBANDO NO IMAGING      [facility]  02/28/2019-2WK POV  03/22/2019-6WK POV

## 2019-02-15 NOTE — ASSESSMENT & PLAN NOTE
· Postop pain management regimen in place  · Continue Tylenol p r n  At time of discharge along with 5 mg of Oxy for moderate pain and 10 mg of Oxy for severe pain in addition to muscle relaxant as per discussion with Neurosurgery

## 2019-02-16 VITALS
DIASTOLIC BLOOD PRESSURE: 64 MMHG | SYSTOLIC BLOOD PRESSURE: 110 MMHG | HEIGHT: 59 IN | RESPIRATION RATE: 18 BRPM | HEART RATE: 77 BPM | BODY MASS INDEX: 34.27 KG/M2 | OXYGEN SATURATION: 98 % | TEMPERATURE: 98 F | WEIGHT: 170 LBS

## 2019-02-16 PROCEDURE — 97116 GAIT TRAINING THERAPY: CPT

## 2019-02-16 PROCEDURE — 97530 THERAPEUTIC ACTIVITIES: CPT

## 2019-02-16 PROCEDURE — 99239 HOSP IP/OBS DSCHRG MGMT >30: CPT | Performed by: NURSE PRACTITIONER

## 2019-02-16 RX ORDER — BACLOFEN 10 MG/1
10 TABLET ORAL 2 TIMES DAILY PRN
Qty: 60 TABLET | Refills: 0
Start: 2019-02-16 | End: 2019-02-16

## 2019-02-16 RX ORDER — DEXAMETHASONE 4 MG/1
4 TABLET ORAL ONCE
Qty: 1 TABLET | Refills: 0
Start: 2019-02-17 | End: 2019-02-17

## 2019-02-16 RX ORDER — GABAPENTIN 100 MG/1
100 CAPSULE ORAL 3 TIMES DAILY
Qty: 90 CAPSULE | Refills: 0
Start: 2019-02-16 | End: 2019-02-16

## 2019-02-16 RX ORDER — BACLOFEN 10 MG/1
10 TABLET ORAL 2 TIMES DAILY PRN
Qty: 20 TABLET | Refills: 0
Start: 2019-02-16 | End: 2019-02-25 | Stop reason: SDUPTHER

## 2019-02-16 RX ORDER — BACLOFEN 10 MG/1
10 TABLET ORAL 2 TIMES DAILY PRN
Qty: 60 TABLET | Refills: 0
Start: 2019-02-16 | End: 2019-02-16 | Stop reason: HOSPADM

## 2019-02-16 RX ORDER — METHYLPREDNISOLONE 4 MG/1
4 TABLET ORAL DAILY
Qty: 1 TABLET | Refills: 0
Start: 2019-02-17 | End: 2019-02-16 | Stop reason: HOSPADM

## 2019-02-16 RX ORDER — METHYLPREDNISOLONE 4 MG/1
4 TABLET ORAL DAILY
Qty: 1 TABLET | Refills: 0
Start: 2019-02-17 | End: 2019-02-16

## 2019-02-16 RX ORDER — ACETAMINOPHEN 325 MG/1
975 TABLET ORAL EVERY 8 HOURS SCHEDULED
Qty: 30 TABLET | Refills: 0
Start: 2019-02-16

## 2019-02-16 RX ORDER — GABAPENTIN 100 MG/1
100 CAPSULE ORAL 3 TIMES DAILY
Qty: 90 CAPSULE | Refills: 0
Start: 2019-02-16 | End: 2019-02-16 | Stop reason: HOSPADM

## 2019-02-16 RX ORDER — GABAPENTIN 100 MG/1
100 CAPSULE ORAL 3 TIMES DAILY
Qty: 90 CAPSULE | Refills: 0
Start: 2019-02-16

## 2019-02-16 RX ADMIN — ACETAMINOPHEN 975 MG: 325 TABLET, FILM COATED ORAL at 15:44

## 2019-02-16 RX ADMIN — GABAPENTIN 100 MG: 100 CAPSULE ORAL at 08:28

## 2019-02-16 RX ADMIN — ACETAMINOPHEN 975 MG: 325 TABLET, FILM COATED ORAL at 05:40

## 2019-02-16 RX ADMIN — PANTOPRAZOLE SODIUM 40 MG: 40 TABLET, DELAYED RELEASE ORAL at 05:41

## 2019-02-16 RX ADMIN — LIDOCAINE 1 PATCH: 50 PATCH CUTANEOUS at 08:28

## 2019-02-16 RX ADMIN — METHYLPREDNISOLONE 8 MG: 4 TABLET ORAL at 08:28

## 2019-02-16 RX ADMIN — GABAPENTIN 100 MG: 100 CAPSULE ORAL at 16:35

## 2019-02-16 RX ADMIN — OXYCODONE HYDROCHLORIDE 10 MG: 10 TABLET ORAL at 08:31

## 2019-02-16 RX ADMIN — OXYCODONE HYDROCHLORIDE 10 MG: 10 TABLET ORAL at 15:42

## 2019-02-16 NOTE — SOCIAL WORK
CM contacted by NP Jose Tobar, per Nino Allan pt is medically cleared for d/c  PT recommending Home PT  CM met with patient and discussed PT recommendation, pt agreeable to [facility] ETHANA  CM sent Referral to [facility] for Home PT per physician

## 2019-02-16 NOTE — ASSESSMENT & PLAN NOTE
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

## 2019-02-16 NOTE — PLAN OF CARE
Problem: PHYSICAL THERAPY ADULT  Goal: Performs mobility at highest level of function for planned discharge setting  See evaluation for individualized goals  Description  Treatment/Interventions: Functional transfer training, LE strengthening/ROM, Elevations, Therapeutic exercise, Endurance training, Patient/family training, Equipment eval/education, Bed mobility, Gait training, Spoke to nursing, Family  Equipment Recommended: Shan Boyer, Other (Comment)(commode )       See flowsheet documentation for full assessment, interventions and recommendations  Outcome: Progressing  Note:   Prognosis: Good  Problem List: Decreased strength, Decreased endurance, Impaired balance, Decreased mobility, Pain, Orthopedic restrictions  Assessment: Pt demonstrated improved functional mobility this session  Ambulated increased distances this session with seated breaks in between to rest due to pain  Performed all transfers & mobility tasks without assistance on levels  Pt also ambulated on steps as noted above with min A & excessive time to perform due to pain  Pt educated on energy conservation and safety for stair training to ensure safe mobiliity with family support  Pt verbalized understanding  At conclusion of session, discussed functional status and discharge planning  Pt feels confident that she would be able to be safe at home with assist from  and mother, and may be able to have 1st floor set up, which would be ideal   Either way, pt is cleared for discharge home with family support for mobility tasks & continued follow up with home PT to further progress mobiilty & activity tolerance to ensure safe return to PLOF  Barriers to Discharge: None  Barriers to Discharge Comments: ROXANA home, steps to second level   Recommendation: Home PT     PT - OK to Discharge: Yes    See flowsheet documentation for full assessment

## 2019-02-16 NOTE — ASSESSMENT & PLAN NOTE
· Secondary to disc herniation/bulge at L5-S1  Weakness noted in proximal left lower extremity greater than right lower extremity likely secondary to pain inhibition improved postoperatively  Sensation remains intact  · MRI lumbar spine without contrast 02/11/2019:   disc bulge with superimposed central/left paracentral disc extrusion at level L5-S1 with 8 millimeter cephalad migration and abutment of ascending S1 nerve root, left greater than right  This causes mild canal narrowing at this level  · CT abdomen pelvis with contrast 02/10/2019:  Prominent disc bulge at L5-S1 causing severe spinal canal narrowing  · POD#3, status post left lumbar laminotomy/diskectomy L5-S1  · PT eval indicates patient is not medically stable to go home at this time  They will re-evaluate her today to assess for disposition  · Continue current pain regimen of scheduled Tylenol, Neurontin and Lidoderm patch, along with p r n  Oxy IR and baclofen  · Continue Medrol Dosepak, last dose tomorrow February 17th  · Encourage incentive spirometry, patient has cough every time she takes a deep breath but over the past 48 hours there is clinical improvement    Continue to encourage the use of incentive spirometry and coughing and deep breathing at home

## 2019-02-16 NOTE — DISCHARGE SUMMARY
Discharge- Sujit Rogers 1990, 33 y o  female MRN: [id number]    Unit/Bed#: CW2 217-03 Encounter: [id number]    Primary Care Provider: DAMION Landa   Date and time admitted to hospital: 2/10/2019  1:55 PM        * Back pain of lumbosacral region with radiculopathy  Assessment & Plan  · Secondary to disc herniation/bulge at L5-S1  Weakness noted in proximal left lower extremity greater than right lower extremity likely secondary to pain inhibition improved postoperatively  Sensation remains intact  · MRI lumbar spine without contrast 02/11/2019:   disc bulge with superimposed central/left paracentral disc extrusion at level L5-S1 with 8 millimeter cephalad migration and abutment of ascending S1 nerve root, left greater than right  This causes mild canal narrowing at this level  · CT abdomen pelvis with contrast 02/10/2019:  Prominent disc bulge at L5-S1 causing severe spinal canal narrowing  · POD#3, status post left lumbar laminotomy/diskectomy L5-S1  · PT eval indicates patient is not medically stable to go home at this time  They will re-evaluate her today to assess for disposition  · Continue current pain regimen of scheduled Tylenol, Neurontin and Lidoderm patch, along with p r n  Oxy IR and baclofen  · Continue Medrol Dosepak, last dose tomorrow February 17th  · Encourage incentive spirometry, patient has cough every time she takes a deep breath but over the past 48 hours there is clinical improvement  Continue to encourage the use of incentive spirometry and coughing and deep breathing at home    Herniation of intervertebral disc between L5 and S1  Assessment & Plan  · Status post lumbar laminotomy/diskectomy L5-S1 left  · Plan as below    Fluid in endometrial cavity  Assessment & Plan  · CT scan on admission- "Increased endometrial fluid and 3 cm left ovarian cyst, correlate with beta-hCG   If positive beta hCG, correlate with pelvic ultrasound "  · HCG obtained and negative Lumbosacral radiculopathy  Assessment & Plan  · Postop pain management regimen in place  · Continue Tylenol p r n  At time of discharge along with 5 mg of Oxy for moderate pain and 10 mg of Oxy for severe pain in addition to muscle relaxant as per discussion with Neurosurgery  Ambulatory dysfunction  Assessment & Plan  · Patient reports that she is able to ambulate with use of walker but is still having difficulty with stairs, per PT she is cleared for discharge home with home PT     · Please note patient will need work no with restrictions 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[employer], 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 to have completed  · Spoke with Neurosurgery who stated that her FMLA papers are in their office and they will notify her when they are completed for her to pick them up  Discharging Physician / Practitioner: Nancy Day  PCP: Nancy Mcclure  Admission Date:   Admission Orders (From admission, onward)    Ordered        02/12/19 1132  Inpatient Admission  Once     Order ID Start Status   [id number] 02/12/19 1133 Completed          02/10/19 2121  Place in Observation (expected length of stay for this patient is less than two midnights)  Once     Order ID Start Status   [id number] 02/10/19 2122 Completed              Discharge Date: 02/16/19    Resolved Problems  Date Reviewed: 2/16/2019    None          Consultations During Hospital Stay:  · Neurosurgery    Procedures Performed:   · Lumbar laminotomy/diskectomy L5-S1 left    Significant Findings / Test Results:   Xr Spine Lumbar 2 Or 3 Views Injury    Result Date: 2/2/2019  Narrative: LUMBAR SPINE INDICATION:   fall, L spine tenderness  Slip and fall  Left-sided back pain  COMPARISON:  None VIEWS:  XR SPINE LUMBAR 2 OR 3 VIEWS INJURY Images: 2 FINDINGS: There is no evidence of acute fracture or destructive osseous lesion  Alignment is unremarkable   No significant lumbar degenerative change noted  The pedicles appear intact  Soft tissues are unremarkable  Impression: No acute fracture or traumatic malalignment  Workstation performed: [id number]     Xr Hip/pelv 2-3 Vws Right If Performed    Result Date: 2/2/2019  Narrative: RIGHT HIP INDICATION:   R hip pain after fall  Slip and fall  Low back pain  COMPARISON:  None VIEWS:  XR HIP/PELV 2-3 VWS RIGHT W PELVIS IF PERFORMED Images: 3 FINDINGS: There is no acute fracture or dislocation  No significant hip degenerative changes  No lytic or blastic osseous lesions  Soft tissues are unremarkable  The visualized lumbar spine is unremarkable  Impression: No acute osseous abnormality  Workstation performed: [id number]     Mri Lumbar Spine Wo Contrast    Result Date: 2/11/2019  Narrative: MRI LUMBAR SPINE WITHOUT CONTRAST INDICATION: back pain  History of fall 2 weeks ago, back pain COMPARISON:  Lumbar spine x-ray 2/2/2019 TECHNIQUE:  Sagittal T1, sagittal T2, sagittal inversion recovery, axial T1 and axial T2, coronal T2   IMAGE QUALITY:  Diagnostic FINDINGS: VERTEBRAL BODIES:  There is preserved normal lumbar lordosis  No significant spondylolisthesis  Normal marrow signal is identified within the visualized bony structures  No discrete marrow lesion  SACRUM:  Normal signal within the sacrum  No evidence of insufficiency or stress fracture  DISTAL CORD AND CONUS:  Normal size and signal within the distal cord and conus  PARASPINAL SOFT TISSUES:  Visualized prevertebral and paraspinal soft tissue are unremarkable  Partially visualized left ovarian cyst with internal septa versus 2 adjacent cysts  LOWER THORACIC DISC SPACES:  Normal disc height and signal   No disc herniation, canal stenosis or foraminal narrowing  LUMBAR DISC SPACES:  There is disc desiccation changes at levels L4-5 and L5-S1  L1-L2:  Within normal limits L2-L3:  Within normal limits L3-L4:  Mild facet arthropathy    No canal or foraminal stenosis  L4-L5:  Mild disc bulge with annular fissure  There is mild bilateral facet arthropathy  There is no significant canal or foraminal stenosis L5-S1:  There is disc bulge with superimposed central/left paracentral disc extrusion with 8 mm cephalad migration  It abuts descending S1 nerve roots, left greater than right and causing mild canal narrowing  There is mild left neural foraminal narrowing  Impression: 1  Disc bulge with superimposed central/left paracentral disc extrusion at level L5-S1 with 8 mm cephalad migration and abutment of descending S1 nerve roots, left greater than right  It causes mild canal narrowing at this level  2   Partially imaged left ovarian cyst with internal septa versus 2 adjacent cysts  Recommend dedicated pelvic ultrasound for further evaluation  if indicated  The study was marked in EPIC for significant notification  Workstation performed: [id number]     Xr Spine Lumbosacral 1 View    Result Date: 2/13/2019  Narrative: C-ARM - INDICATION:  Lumbosacral radiculopathy [M54 17]  Procedure guidance  COMPARISON:  None TECHNIQUE: FLUOROSCOPY TIME:   6 seconds 1 FLUOROSCOPIC IMAGES FINDINGS: Fluoroscopic guidance provided for surgical procedure  Osseous and soft tissue detail limited by technique  Intraoperative level verification performed with probe at the L5-S1 disc level  Impression: Fluoroscopic guidance provided for surgical procedure  Please refer to the separate procedure notes for additional details  Workstation performed: [id number]     Ct Abdomen Pelvis With Contrast    Result Date: 2/10/2019  Narrative: CT ABDOMEN AND PELVIS WITH IV CONTRAST INDICATION:   Right lower quadrant abdominal pain  COMPARISON:  None  TECHNIQUE:  CT examination of the abdomen and pelvis was performed  Axial, sagittal, and coronal 2D reformatted images were created from the source data and submitted for interpretation   Radiation dose length product (DLP) for this visit:  398 43 mGy-cm   This examination, like all CT scans performed in the [place], was performed utilizing techniques to minimize radiation dose exposure, including the use of iterative  reconstruction and automated exposure control  IV Contrast:  100 mL of iohexol (OMNIPAQUE) Enteric Contrast:  Enteric contrast was not administered  FINDINGS: ABDOMEN LOWER CHEST:  Right basilar dependent thickening possibly from subsegmental atelectasis  LIVER/BILIARY TREE:  Liver is diffusely decreased in density consistent with fatty change  No CT evidence of suspicious hepatic mass  Normal hepatic contours  No biliary dilatation  GALLBLADDER:  No calcified gallstones  No pericholecystic inflammatory change  SPLEEN:  Unremarkable  PANCREAS:  Unremarkable  ADRENAL GLANDS:  Unremarkable  KIDNEYS/URETERS:  Unremarkable  No hydronephrosis  STOMACH AND BOWEL:  Unremarkable  APPENDIX:  No findings to suggest appendicitis  ABDOMINOPELVIC CAVITY:  No ascites or free intraperitoneal air  No lymphadenopathy  VESSELS:  Unremarkable for patient's age  PELVIS REPRODUCTIVE ORGANS:  Increased endometrial fluid and 3 cm left ovarian cyst, correlate with beta-hCG  URINARY BLADDER:  Unremarkable  ABDOMINAL WALL/INGUINAL REGIONS:  Unremarkable  OSSEOUS STRUCTURES:  Prominent disc bulge at L5-S1 causing severe spinal canal narrowing, correlate with lumbar spine MRI  · Impression: Prominent disc bulge at L5-S1 causing severe spinal canal narrowing, correlate with lumbar spine MRI  Increased endometrial fluid and 3 cm left ovarian cyst, correlate with beta-hCG  If positive beta hCG, correlate with pelvic ultrasound  Workstation performed: [id number]   ·     Incidental Findings:   · As noted above     Test Results Pending at Discharge (will require follow up):    · None     Outpatient Tests Requested:  · None    Complications:  None    Reason for Admission:  Left-sided radicular back pain    Hospital Course:     David Valenciain is a 33 y o  female patient who originally presented to the hospital on 2/10/2019 due to back pain  Symptoms had been present for about a month and progressively worsening mainly in the left low back radiating down buttocks toward her foot  She had noted tingling but no numbness and also with difficulty ambulating  Presented to emergency department CAT Scan showed prominent disc bulge at L5-S1 causing severe spinal canal narrowing  Lumbar spine MRI showed disc bulge with superimposed central/left paracentral disc extrusion at L5-S1  Neurosurgery was consulted  On February 13th she underwent a lumbar laminotomy/diskectomy of L5-S1 on the left  Postoperatively she had pain that did cause her hospital stay to be prolonged  She also needed to be cleared by PT as she has 15 stairs in her home that she needs to climb in order to use the bathroom and to get to her area of sleep  She did improve and was medically stable for discharge home  She will follow up with Neurosurgery as an outpatient  She will continue with home PT after discharge  She she was advised that she may not return to work until cleared by Neurosurgery  Incidentally noted on CAT Scan showed increased endometrial fluid and 3 cm left ovarian cyst of which a beta HCG was negative therefore no further workup was obtained  Outpatient follow-up if she she developed pain  Please see above list of diagnoses and related plan for additional information  Condition at Discharge: stable     Discharge Day Visit / Exam:     Subjective:  Reports pain in low back radiates down left leg but overall pain has improved  She is able to ambulate with use of walker  No chest pain or shortness of breath no calf tenderness    Vitals: Blood Pressure: 110/64 (02/16/19 0709)  Pulse: 77 (02/16/19 0710)  Temperature: 98 °F (36 7 °C) (02/16/19 0709)  Temp Source: Oral (02/16/19 0709)  Respirations: 18 (02/16/19 0710)  Height: 4' 11" (149 9 cm) (02/10/19 2300)  Weight - Scale: 77 1 kg (170 lb) (02/10/19 2300)  SpO2: 98 % (02/16/19 0709)  Exam:   Physical Exam   Constitutional: She is oriented to person, place, and time  She appears well-developed and well-nourished  No distress  HENT:   Head: Normocephalic and atraumatic  Mouth/Throat: Oropharynx is clear and moist    Eyes: Pupils are equal, round, and reactive to light  Neck: Neck supple  Cardiovascular: Normal rate and regular rhythm  Pulmonary/Chest: Effort normal and breath sounds normal  No respiratory distress  Abdominal: Soft  Bowel sounds are normal  She exhibits no distension  Musculoskeletal: She exhibits no edema  Neurological: She is alert and oriented to person, place, and time  No cranial nerve deficit  Skin: Skin is warm and dry  Capillary refill takes less than 2 seconds  No erythema  Low back incision well-approximated no drainage no erythema   Psychiatric: She has a normal mood and affect  Her behavior is normal    Vitals reviewed  Discussion with Family:      Discharge instructions/Information to patient and family:   See after visit summary for information provided to patient and family  Provisions for Follow-Up Care:  See after visit summary for information related to follow-up care and any pertinent home health orders  Disposition:     Home    For Discharges to [facility] SNF:   · Not Applicable to this Patient - Not Applicable to this Patient    Planned Readmission:  Not anticipated     Discharge Statement:  I spent 40 minutes discharging the patient  This time was spent on the day of discharge  I had direct contact with the patient on the day of discharge  Greater than 50% of the total time was spent examining patient, answering all patient questions, arranging and discussing plan of care with patient as well as directly providing post-discharge instructions  Additional time then spent on discharge activities      Discharge Medications:  See after visit summary for reconciled discharge medications provided to patient and family        ** Please Note: This note has been constructed using a voice recognition system **

## 2019-02-16 NOTE — PHYSICAL THERAPY NOTE
Physical Therapy Progress Note     02/16/19 1210   Pain Assessment   Pain Assessment 0-10   Pain Score 7   Effect of Pain on Daily Activities impaired mobility, decreased positional tolerance   Patient's Stated Pain Goal No pain   Hospital Pain Intervention(s) Ambulation/increased activity; Distraction;Repositioned;Rest;Environmental changes; Emotional support   Response to Interventions tolerated   Restrictions/Precautions   Other Precautions Pain; Fall Risk;Spinal precautions   Subjective   Subjective Pt encountered supine in bed, pleasant and agreeable to treatment  Reported increased pain today, but stated it improved with rest breaks  Pt states she will have support from  and mother at home 24/7 and may be able to bring twin bed down to 1st floor to sleep on  Bed Mobility   Supine to Sit 5  Supervision   Additional items Assist x 1;HOB elevated; Increased time required; Bedrails   Transfers   Sit to Stand 5  Supervision   Additional items Assist x 1; Armrests; Increased time required   Stand to Sit 5  Supervision   Additional items Assist x 1; Armrests; Increased time required   Toilet transfer 5  Supervision   Additional items Assist x 1; Armrests; Increased time required   Ambulation/Elevation   Gait pattern Improper Weight shift; Antalgic;Decreased foot clearance; Excessively slow; Step to;Short stride; Foward flexed; Inconsistent nicole; Shuffling   Gait Assistance 5  Supervision   Additional items Assist x 1   Assistive Device Rolling walker   Distance 30' x 2, 100'   Stair Management Assistance 4  Minimal assist   Additional items Assist x 1;Verbal cues   Stair Management Technique One rail R;Step to pattern; Sideways   Number of Stairs 10   Balance   Static Sitting Fair +   Static Standing Fair   Ambulatory Fair -   Endurance Deficit   Endurance Deficit Yes   Endurance Deficit Description pain   Activity Tolerance   Activity Tolerance Patient tolerated treatment well;Patient limited by pain   Nurse Made Aware BRIGITTE Stewart   Assessment   Prognosis Good   Problem List Decreased strength;Decreased endurance; Impaired balance;Decreased mobility;Pain;Orthopedic restrictions   Assessment Pt demonstrated improved functional mobility this session  Ambulated increased distances this session with seated breaks in between to rest due to pain  Performed all transfers & mobility tasks without assistance on levels  Pt also ambulated on steps as noted above with min A & excessive time to perform due to pain  Pt educated on energy conservation and safety for stair training to ensure safe mobiliity with family support  Pt verbalized understanding  At conclusion of session, discussed functional status and discharge planning  Pt feels confident that she would be able to be safe at home with assist from  and mother, and may be able to have 1st floor set up, which would be ideal   Either way, pt is cleared for discharge home with family support for mobility tasks & continued follow up with home PT to further progress mobiilty & activity tolerance to ensure safe return to PLOF  Barriers to Discharge None   Goals   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

## 2019-02-18 NOTE — TELEPHONE ENCOUNTER
Called Teetee Tolentino to follow up on patient POD 5 who was recently d/c'd from the hospital      Patient reports that she is doing very well overall and denies any incisional issues or fevers  Patient denies any bleeding, dizziness, fever, redness, significant pain and swelling  Verified date/time/location of her upcoming POV on 2/28/2019 and advised her to call the office with any further questions or concerns, or if any incisional issues or fevers would arise  Pain medication is currently controlling pain  Patient has moved her bowels since the surgery and is aware that they should take a stool softener if taking narcotic pain medication to help prevent constipation  Patient also educated on the importance of moving frequently and drinking plenty of liquids  Patient received and does understand the discharge instructions  Reviewed incision care with Patient and she has no further questions at this time  Patient was appreciative for the call  Patient will be sending me FMLA paperwork that needs to be completed for her job  Will await email

## 2019-02-20 NOTE — UTILIZATION REVIEW
Notification of Discharge  This is a Notification of Discharge from our facility [address]  Please be advised that this patient has been discharge from our facility  Below you will find the admission and discharge date and time including the patients disposition  PRESENTATION DATE: 2/10/2019  1:55 PM  IP ADMISSION DATE: 2/12/19 1133  DISCHARGE DATE: 2/16/2019  6:43 PM  DISPOSITION: Home with [address] Utilization Review Department  Phone: [phone] Otis Hwang Fax [phone]     Send all requests for admission clinical reviews, approved or denied determinations and any other requests to fax [phone]   All voicemails are confidential

## 2019-02-22 DIAGNOSIS — Z98.890 POSTOPERATIVE STATE: ICD-10-CM

## 2019-02-22 DIAGNOSIS — M54.40 BACK PAIN OF LUMBOSACRAL REGION WITH SCIATICA: ICD-10-CM

## 2019-02-22 DIAGNOSIS — M51.27 HERNIATION OF INTERVERTEBRAL DISC BETWEEN L5 AND S1: ICD-10-CM

## 2019-02-22 RX ORDER — OXYCODONE HYDROCHLORIDE 5 MG/1
TABLET ORAL
Qty: 45 TABLET | Refills: 0 | Status: CANCELLED | OUTPATIENT
Start: 2019-02-22

## 2019-02-25 ENCOUNTER — DOCUMENTATION (OUTPATIENT)
Dept: NEUROSURGERY | Facility: CLINIC | Age: 29
End: 2019-02-25

## 2019-02-25 ENCOUNTER — TELEPHONE (OUTPATIENT)
Dept: NEUROSURGERY | Facility: CLINIC | Age: 29
End: 2019-02-25

## 2019-02-25 DIAGNOSIS — M54.40 BACK PAIN OF LUMBOSACRAL REGION WITH SCIATICA: ICD-10-CM

## 2019-02-25 DIAGNOSIS — G89.18 POSTOPERATIVE PAIN: ICD-10-CM

## 2019-02-25 DIAGNOSIS — R60.9 POSTOPERATIVE EDEMA: ICD-10-CM

## 2019-02-25 DIAGNOSIS — M62.830 SPASM OF MUSCLE OF LOWER BACK: ICD-10-CM

## 2019-02-25 DIAGNOSIS — M62.838 MUSCLE SPASM OF LEFT LOWER EXTREMITY: ICD-10-CM

## 2019-02-25 DIAGNOSIS — M51.27 HERNIATION OF INTERVERTEBRAL DISC BETWEEN L5 AND S1: ICD-10-CM

## 2019-02-25 DIAGNOSIS — Z98.890 POSTOPERATIVE STATE: Primary | ICD-10-CM

## 2019-02-25 RX ORDER — OXYCODONE HYDROCHLORIDE 5 MG/1
TABLET ORAL
Qty: 40 TABLET | Refills: 0 | Status: SHIPPED | OUTPATIENT
Start: 2019-02-25 | End: 2019-03-22

## 2019-02-25 RX ORDER — DEXAMETHASONE 2 MG/1
TABLET ORAL
Qty: 28 TABLET | Refills: 0 | Status: SHIPPED | OUTPATIENT
Start: 2019-02-25 | End: 2019-03-22 | Stop reason: SDUPTHER

## 2019-02-25 RX ORDER — BACLOFEN 10 MG/1
TABLET ORAL
Qty: 21 TABLET | Refills: 0 | Status: SHIPPED | OUTPATIENT
Start: 2019-02-25

## 2019-02-25 NOTE — TELEPHONE ENCOUNTER
02/13/19 1059         Procedure: LUMBAR LAMINOTOMY/DISCECTOMY L5-S1, LEFT (Left Spine Lumbar)   Telephoned patient today she reports continues post operative pain request refill oxycodone  She describes low back pain and muscle spasm around operative area and pain across both buttocks, the pain in left buttock is also radiating into left lower extremity  Case discussed with Dr Sherley Ballesteros --concern for postoperative edema   Med orders ---add dexamethasone ---, increase baclofen and reorder percocet --stress do not take more than one every 4 hours  Explained likely experiencing muscle spasm and operative area edema causing pain and spasm  Male in home translation --patient passed phone to him     Both verbalized an understanding of information discussed

## 2019-02-28 ENCOUNTER — OFFICE VISIT (OUTPATIENT)
Dept: NEUROSURGERY | Facility: CLINIC | Age: 29
End: 2019-02-28

## 2019-02-28 VITALS
HEIGHT: 59 IN | TEMPERATURE: 98 F | DIASTOLIC BLOOD PRESSURE: 86 MMHG | SYSTOLIC BLOOD PRESSURE: 128 MMHG | WEIGHT: 177 LBS | BODY MASS INDEX: 35.68 KG/M2 | HEART RATE: 80 BPM | RESPIRATION RATE: 16 BRPM

## 2019-02-28 DIAGNOSIS — M54.17 LUMBOSACRAL RADICULOPATHY: ICD-10-CM

## 2019-02-28 DIAGNOSIS — M54.50 ACUTE EXACERBATION OF CHRONIC LOW BACK PAIN: Primary | ICD-10-CM

## 2019-02-28 DIAGNOSIS — Z48.89 AFTERCARE FOLLOWING SURGERY: ICD-10-CM

## 2019-02-28 DIAGNOSIS — G89.4 CHRONIC PAIN SYNDROME: ICD-10-CM

## 2019-02-28 DIAGNOSIS — G89.29 ACUTE EXACERBATION OF CHRONIC LOW BACK PAIN: Primary | ICD-10-CM

## 2019-02-28 DIAGNOSIS — M54.40 BACK PAIN OF LUMBOSACRAL REGION WITH SCIATICA: ICD-10-CM

## 2019-02-28 DIAGNOSIS — Z98.890 STATUS POST LUMBAR LAMINECTOMY: ICD-10-CM

## 2019-02-28 DIAGNOSIS — M51.27 HERNIATION OF INTERVERTEBRAL DISC BETWEEN L5 AND S1: ICD-10-CM

## 2019-02-28 PROCEDURE — 99024 POSTOP FOLLOW-UP VISIT: CPT | Performed by: NURSE PRACTITIONER

## 2019-02-28 RX ORDER — IBUPROFEN 600 MG/1
TABLET ORAL AS NEEDED
COMMUNITY
Start: 2016-04-04

## 2019-02-28 NOTE — PROGRESS NOTES
Assessment/Plan:    Diagnoses and all orders for this visit:    Acute exacerbation of chronic back pain     Chronic pain syndrome    Back pain of lumbosacral region with radiculopathy    Lumbosacral radiculopathy    Herniation of intervertebral disc between L5 and S1    Aftercare following surgery    Status post lumbar laminectomy    Other orders  -     ibuprofen (MOTRIN) 600 mg tablet; Take by mouth as needed        Subjective:      Patient ID: Ute Kim is a 33 y o  female  HPI   She presents accompanied by mother for 2 week post operative visit   She is status post surgery with Dr Deion Corrales on 2/13/2019 for LUMBAR LAMINOTOMY/DISCECTOMY L5-S1, LEFT (Left Spine Lumbar)  She has a long standing history of chronic pain in her low back with radiation down her posterior left lower extremity into her foot  Her chronic pain symptoms started to worsen several weeks ago  Becoming acute after a slip on a mat and  Fall onto her buttocks  She was ambulatory after the fall but had pain in her low back, right hip and down left leg  She reports surgery has delivered efficacy for relief of back pain and left lower extremity pain  Left lower extremity persisted postoperatively until she started Dexamethasone decreasing dosing schedule a 4 days regimen on 2/25, she reports by Wednesday no longer had left leg pain  She is please with the outcome of surgery  Lumbar incision closure with dissolvable sutures that remain intact  The incision is clean, dry and intact, without erythema, dehiscence, drainage or s/s of infection, well healed with well approximated wound edges  Cut knots on ends of dissolvable sutures, she tolerated procedure well  Reports is receiving home therapy 2 times per week since discharge home after surgery  She reports therapy adheres to activity restrictions of the BLTs       She expresses concern for when she will be released to return to work, she is not expecting to be released for return at 10 week postop visit  She expressed concern 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 [employer]  She is requesting to receive specific work requirement activity restrictions one of which not to work between several buildings during a shift, this is when she must carry heavy trash bags  She is request to provide specific information pertaining to her work responsibilities  for discussion with Dr Marguerite Hyatt  The following instructions are reviewed in detail and continue thru next 4 weeks (6 week post op)    At 2 weeks postop can resume restricted medications such as ASA, products containing ASA, NSAID, fish oils, and OTC products or as previously directed  Resume driving 2 week postoperatively  Continue to observe incisions for redness, drainage, swelling dehiscence, increased pain, fever >/= 101, warmth to touch incision or skin surrounding, if occur call or report to office immediately for reassessment  Continue showers using clean towel and wash cloth with OTC antibacterial body wash, pat dry after showering continue protocol for an additional 2 weeks  Do not apply lotions, creams, powder, or ointments to surgical incisions  Activity as tolerated, no bending, lifting  greater than 10 lbs, turning, stretching, no pulling, pushing  ambulation as tolerated  Refrain from strenuous activity, bending, and  twisting back  No Immersion in water such as swimming, hot tub, or tub bath  Informed of follow-up appointment in 4 weeks  If  there is any significant change in her neurologic status, call and/or return to the office immediately for reassessment     Impressions and treatment recommendations were discussed in detail with the patient who verbalized understanding, all questions were answered and contact information was given in the event future questions arise      The following portions of the patient's history were reviewed and updated as appropriate:   She  has a past medical history of GERD (gastroesophageal reflux disease) and Obesity  She   Patient Active Problem List    Diagnosis Date Noted    Status post lumbar laminectomy 02/28/2019    Chronic pain syndrome 02/28/2019    Aftercare following surgery 02/28/2019    Fluid in endometrial cavity 02/12/2019    Ambulatory dysfunction 02/10/2019    Back pain of lumbosacral region with radiculopathy 02/10/2019    Lumbosacral radiculopathy 02/10/2019    Herniation of intervertebral disc between L5 and S1 02/10/2019    Diffuse abdominal pain 11/20/2018    Irritable bowel syndrome with diarrhea 11/20/2018    KELLY (obstructive sleep apnea)     Periodic limb movements of sleep      She  has a past surgical history that includes Tubal ligation; Hernia repair; pr colonoscopy flx dx w/collj spec when pfrmd (N/A, 12/20/2018); pr esophagogastroduodenoscopy transoral diagnostic (N/A, 12/20/2018); and Posterior laminectomy thoracic and lumbar spine (Left, 2/13/2019)  Her family history is not on file  She  reports that she has never smoked  She has never used smokeless tobacco  She reports that she does not drink alcohol or use drugs    Current Outpatient Medications   Medication Sig Dispense Refill    acetaminophen (TYLENOL) 325 mg tablet Take 3 tablets (975 mg total) by mouth every 8 (eight) hours (Patient taking differently: Take 975 mg by mouth as needed ) 30 tablet 0    baclofen 10 mg tablet Take one tablet by mouth every 8 hours for back and leg spasm 21 tablet 0    dexamethasone (DECADRON) 2 mg tablet Take 2 (4mg) tabs by mouth every 6 hours for 2 days, 1 tab every 6 hours for 2 days,one tab every 12 hours for 1 day, 1 tab daily for 2 days 28 tablet 0    gabapentin (NEURONTIN) 100 mg capsule Take 1 capsule (100 mg total) by mouth 3 (three) times a day 90 capsule 0    ibuprofen (MOTRIN) 600 mg tablet Take by mouth as needed      omeprazole (PriLOSEC) 20 mg delayed release capsule Take 1 tablet by mouth daily      oxyCODONE (ROXICODONE) 5 mg immediate release tablet Take 1 tablet by mouth every 4 hours for postoperative pain not to exceed 6 tablets per day 40 tablet 0     No current facility-administered medications for this visit  She is allergic to septra [sulfamethoxazole-trimethoprim]; ciprofloxacin hcl; and zosyn [piperacillin sod-tazobactam so]       Review of Systems   Constitutional: Negative  HENT: Negative  Eyes: Negative  Respiratory: Negative  Cardiovascular: Negative  Gastrointestinal: Positive for constipation  Endocrine: Negative  Genitourinary: Negative  Musculoskeletal: Positive for back pain (Across LB radiates down right leg )  Skin:        Incision from surgery    Allergic/Immunologic: Negative  Neurological: Negative  Hematological: Negative  Psychiatric/Behavioral: Negative  All other systems reviewed and are negative  Objective:      /86 (BP Location: Right arm, Patient Position: Sitting, Cuff Size: Adult)   Pulse 80   Temp 98 °F (36 7 °C) (Tympanic)   Resp 16   Ht 4' 11" (1 499 m)   Wt 80 3 kg (177 lb)   BMI 35 75 kg/m²          Physical Exam   Constitutional: She is oriented to person, place, and time  She appears well-nourished  No distress  Short stature obese   HENT:   Head: Normocephalic and atraumatic  Eyes: Right eye exhibits no discharge  Left eye exhibits no discharge  No scleral icterus  Neck: Normal range of motion  Neck supple  Cardiovascular: Normal rate and regular rhythm  Pulmonary/Chest: Effort normal  No respiratory distress  Abdominal:   Obese abdomen   Musculoskeletal: Normal range of motion  She exhibits no edema, tenderness or deformity  No twisting or bending   Neurological: She is alert and oriented to person, place, and time  No cranial nerve deficit  Coordination normal    Skin: Skin is warm and dry  She is not diaphoretic

## 2019-03-22 ENCOUNTER — OFFICE VISIT (OUTPATIENT)
Dept: NEUROSURGERY | Facility: CLINIC | Age: 29
End: 2019-03-22

## 2019-03-22 VITALS
SYSTOLIC BLOOD PRESSURE: 107 MMHG | TEMPERATURE: 99.1 F | HEIGHT: 59 IN | DIASTOLIC BLOOD PRESSURE: 68 MMHG | RESPIRATION RATE: 16 BRPM | WEIGHT: 177 LBS | BODY MASS INDEX: 35.68 KG/M2 | HEART RATE: 95 BPM

## 2019-03-22 DIAGNOSIS — R60.9 POSTOPERATIVE EDEMA: Primary | ICD-10-CM

## 2019-03-22 DIAGNOSIS — Z48.89 AFTERCARE FOLLOWING SURGERY: Primary | ICD-10-CM

## 2019-03-22 DIAGNOSIS — Z98.890 STATUS POST LUMBAR LAMINECTOMY: ICD-10-CM

## 2019-03-22 DIAGNOSIS — G89.18 POSTOPERATIVE PAIN: ICD-10-CM

## 2019-03-22 PROCEDURE — 99024 POSTOP FOLLOW-UP VISIT: CPT | Performed by: NEUROLOGICAL SURGERY

## 2019-03-22 RX ORDER — DEXAMETHASONE 2 MG/1
TABLET ORAL
Qty: 28 TABLET | Refills: 0 | Status: SHIPPED | OUTPATIENT
Start: 2019-03-22 | End: 2019-05-08 | Stop reason: ALTCHOICE

## 2019-03-22 NOTE — PROGRESS NOTES
Assessment/Plan:    No problem-specific Assessment & Plan notes found for this encounter  6 weeks post op from lumbar diskectomy  She still has low back and leg pain that different from previous pain  Still has muscle spasms  PT and will try steroid taper  Diagnoses and all orders for this visit:    Aftercare following surgery  -     Ambulatory referral to Physical Therapy; Future          Subjective:      Patient ID: Vance Rodrigues is a 33 y o  female  6 weeks post op still complaining of pain in the legs  HPI    The following portions of the patient's history were reviewed and updated as appropriate: allergies, current medications, past family history, past medical history, past social history, past surgical history and problem list     Review of Systems   Constitutional: Negative  HENT: Negative  Eyes: Negative  Respiratory: Negative  Cardiovascular: Negative  Gastrointestinal: Positive for constipation  Endocrine: Negative  Genitourinary: Negative  Musculoskeletal: Positive for back pain (c/o incision pain internally, muscle spasms )  Skin:        Incision from surgery    Allergic/Immunologic: Negative  Neurological: Negative  Hematological: Negative  Psychiatric/Behavioral: Negative  All other systems reviewed and are negative  Objective:      /68 (BP Location: Right arm)   Pulse 95   Temp 99 1 °F (37 3 °C) (Tympanic)   Resp 16   Ht 4' 11" (1 499 m)   Wt 80 3 kg (177 lb)   BMI 35 75 kg/m²          Physical Exam   Constitutional: She is oriented to person, place, and time  She appears well-developed  HENT:   Head: Normocephalic  Eyes: Pupils are equal, round, and reactive to light  EOM are normal    Cardiovascular: Normal rate  Pulmonary/Chest: Effort normal    Neurological: She is alert and oriented to person, place, and time

## 2019-03-22 NOTE — PROGRESS NOTES
Reorder dexamethasone taper as per discussion w/ Dr Jl Sullivan, she continues with comp[laint of low back pain  and leg pain different from previous pain, continues with muscle spasm

## 2019-03-28 ENCOUNTER — EVALUATION (OUTPATIENT)
Dept: PHYSICAL THERAPY | Facility: REHABILITATION | Age: 29
End: 2019-03-28
Payer: COMMERCIAL

## 2019-03-28 DIAGNOSIS — Z98.890 S/P LUMBAR DISCECTOMY: Primary | ICD-10-CM

## 2019-03-28 DIAGNOSIS — Z48.89 AFTERCARE FOLLOWING SURGERY: ICD-10-CM

## 2019-03-28 PROCEDURE — 97110 THERAPEUTIC EXERCISES: CPT | Performed by: PHYSICAL THERAPIST

## 2019-03-28 PROCEDURE — G8991 OTHER PT/OT GOAL STATUS: HCPCS | Performed by: PHYSICAL THERAPIST

## 2019-03-28 PROCEDURE — 97161 PT EVAL LOW COMPLEX 20 MIN: CPT | Performed by: PHYSICAL THERAPIST

## 2019-03-28 PROCEDURE — G8990 OTHER PT/OT CURRENT STATUS: HCPCS | Performed by: PHYSICAL THERAPIST

## 2019-03-28 NOTE — PROGRESS NOTES
PT Evaluation     Today's date: 3/28/2019  Patient name: Isa Drummond  : 1990  MRN: [id number]  Referring provider: Jamarcus Marroquin MD  Dx:   Encounter Diagnosis     ICD-10-CM    1  S/P lumbar discectomy Z98 [id number]    2  Aftercare following surgery [id number] Ambulatory referral to Physical Therapy                  Assessment  Assessment details: Patient is a 33 y o  female referred to PT by Dr Vanna Lara with a dx of s/p lumbar laminotomy/discectomy  Patient reports onset of pain complaints have been ongoing and progressively worsened  She elected to undergo surgical intervention on 2019  She presents to PT today with continued complaints of LB and L LE pain the is similar to her previous pain complaints  The pain complaints are aggravated by forward bending and prolonged sitting  She denies bowel and bladder abnormalities and displays no evidence of neurologically mediated weakness  No other referral appears necessary at this time  Impairments: abnormal or restricted ROM, impaired physical strength, lacks appropriate home exercise program, pain with function and poor posture   Functional limitations: IADLs; Recreational activities  Symptom irritability: moderateBarriers to therapy: chronicity  Understanding of Dx/Px/POC: good   Prognosis: good    Goals  Short Term goals - 4 weeks  1  Patient will be independent and compliant with a HEP  2   Patient will report a 50% decrease in pain complaints  Long Term goals - 8 weeks  1  Patient will report elimination of pain complaints  2   Patient will return to all IADLs without restriction  3   Patient will return to all recreational activities without restriction      Plan  Patient would benefit from: skilled physical therapy  Planned modality interventions: cryotherapy  Planned therapy interventions: abdominal trunk stabilization, joint mobilization, manual therapy, neuromuscular re-education, patient education, postural training, therapeutic exercise, home exercise program, graded exercise and functional ROM exercises  Frequency: 2x week  Duration in visits: 12  Duration in weeks: 6  Treatment plan discussed with: patient        Subjective Evaluation    History of Present Illness  Date of surgery: 2019  Mechanism of injury: surgery  Mechanism of injury: Chronic LB and L LE pain progressively worsened          Not a recurrent problem   Quality of life: good    Pain  Current pain ratin  At best pain ratin  At worst pain ratin  Location: LB/L hip lateral thigh  Quality: discomfort, sharp and radiating  Aggravating factors: sitting (Bending)  Progression: improved    Social Support    Employment status: not working    Diagnostic Tests  MRI studies: abnormal  Treatments  Previous treatment: medication  Current treatment: physical therapy  Patient Goals  Patient goals for therapy: decreased pain, increased motion, improved balance, increased strength, independence with ADLs/IADLs and return to sport/leisure activities          Objective     Concurrent Complaints  Negative for night pain, disturbed sleep, bladder dysfunction, bowel dysfunction and saddle (S4) numbness    Neurological Testing     Sensation     Lumbar   Left   Intact: light touch    Right   Intact: light touch    Reflexes   Left   Patellar (L4): normal (2+)  Achilles (S1): normal (2+)    Right   Patellar (L4): normal (2+)  Achilles (S1): normal (2+)    Active Range of Motion     Lumbar   Flexion:  with pain Restriction level: maximal  Extension:  with pain Restriction level: maximal  Left lateral flexion:  with pain Restriction level: moderate  Right lateral flexion:  with pain Restriction level: moderate    Passive Range of Motion     Lumbar   Left lateral flexion:  Restriction level: moderate  Right lateral flexion:  Restriction level: moderate  Mechanical Assessment    Cervical      Thoracic    Lying extension: repeated movements  Pain location: centralized  Pain intensity: better    Lumbar    Standing extension: repeated movements  Pain location: no change  Pain level: increased    Tests     Lumbar     Left   Positive passive SLR and slump test      Right   Positive slump test      General Comments:    Lower quarter screen   Hips: unremarkable  Knees: unremarkable      Flowsheet Rows      Most Recent Value   PT/OT G-Codes   Current Score  32   Projected Score  49   FOTO information reviewed  Yes   Assessment Type  Evaluation   G code set  Other PT/OT Primary   Other PT Primary Current Status ()  CL   Other PT Primary Goal Status ()  CK          Precautions: s/p lumbar discectomy    Daily Treatment Diary     Manual              Ext mob in prone                                                                     Exercise Diary              Treadmill             Prone on elbows             Prone press ups             Standing back bends                                                                                                                                                                                                                                 Modalities

## 2019-04-02 ENCOUNTER — OFFICE VISIT (OUTPATIENT)
Dept: PHYSICAL THERAPY | Facility: REHABILITATION | Age: 29
End: 2019-04-02
Payer: COMMERCIAL

## 2019-04-02 DIAGNOSIS — Z48.89 AFTERCARE FOLLOWING SURGERY: Primary | ICD-10-CM

## 2019-04-02 DIAGNOSIS — Z98.890 S/P LUMBAR DISCECTOMY: ICD-10-CM

## 2019-04-02 PROCEDURE — 97112 NEUROMUSCULAR REEDUCATION: CPT

## 2019-04-02 PROCEDURE — 97110 THERAPEUTIC EXERCISES: CPT

## 2019-04-05 ENCOUNTER — OFFICE VISIT (OUTPATIENT)
Dept: PHYSICAL THERAPY | Facility: REHABILITATION | Age: 29
End: 2019-04-05
Payer: COMMERCIAL

## 2019-04-05 DIAGNOSIS — Z98.890 S/P LUMBAR DISCECTOMY: Primary | ICD-10-CM

## 2019-04-05 PROCEDURE — 97110 THERAPEUTIC EXERCISES: CPT

## 2019-04-05 PROCEDURE — 97112 NEUROMUSCULAR REEDUCATION: CPT

## 2019-04-09 ENCOUNTER — OFFICE VISIT (OUTPATIENT)
Dept: PHYSICAL THERAPY | Facility: REHABILITATION | Age: 29
End: 2019-04-09
Payer: COMMERCIAL

## 2019-04-09 DIAGNOSIS — Z98.890 S/P LUMBAR DISCECTOMY: Primary | ICD-10-CM

## 2019-04-09 PROCEDURE — 97112 NEUROMUSCULAR REEDUCATION: CPT

## 2019-04-09 PROCEDURE — 97530 THERAPEUTIC ACTIVITIES: CPT

## 2019-04-09 PROCEDURE — 97110 THERAPEUTIC EXERCISES: CPT

## 2019-04-12 ENCOUNTER — APPOINTMENT (OUTPATIENT)
Dept: PHYSICAL THERAPY | Facility: REHABILITATION | Age: 29
End: 2019-04-12
Payer: COMMERCIAL

## 2019-04-16 ENCOUNTER — OFFICE VISIT (OUTPATIENT)
Dept: PHYSICAL THERAPY | Facility: REHABILITATION | Age: 29
End: 2019-04-16
Payer: COMMERCIAL

## 2019-04-16 DIAGNOSIS — Z98.890 S/P LUMBAR DISCECTOMY: Primary | ICD-10-CM

## 2019-04-16 PROCEDURE — 97110 THERAPEUTIC EXERCISES: CPT

## 2019-04-16 PROCEDURE — 97112 NEUROMUSCULAR REEDUCATION: CPT

## 2019-04-19 ENCOUNTER — OFFICE VISIT (OUTPATIENT)
Dept: PHYSICAL THERAPY | Facility: REHABILITATION | Age: 29
End: 2019-04-19
Payer: COMMERCIAL

## 2019-04-19 DIAGNOSIS — Z98.890 S/P LUMBAR DISCECTOMY: Primary | ICD-10-CM

## 2019-04-19 PROCEDURE — 97530 THERAPEUTIC ACTIVITIES: CPT

## 2019-04-19 PROCEDURE — 97112 NEUROMUSCULAR REEDUCATION: CPT

## 2019-04-19 PROCEDURE — 97110 THERAPEUTIC EXERCISES: CPT

## 2019-04-26 ENCOUNTER — OFFICE VISIT (OUTPATIENT)
Dept: PHYSICAL THERAPY | Facility: REHABILITATION | Age: 29
End: 2019-04-26
Payer: COMMERCIAL

## 2019-04-26 DIAGNOSIS — Z98.890 S/P LUMBAR DISCECTOMY: Primary | ICD-10-CM

## 2019-04-26 PROCEDURE — 97110 THERAPEUTIC EXERCISES: CPT

## 2019-04-26 PROCEDURE — 97112 NEUROMUSCULAR REEDUCATION: CPT

## 2019-05-01 ENCOUNTER — PATIENT MESSAGE (OUTPATIENT)
Dept: NEUROSURGERY | Facility: CLINIC | Age: 29
End: 2019-05-01

## 2019-05-01 ENCOUNTER — APPOINTMENT (OUTPATIENT)
Dept: PHYSICAL THERAPY | Facility: REHABILITATION | Age: 29
End: 2019-05-01
Payer: COMMERCIAL

## 2019-05-03 ENCOUNTER — TELEPHONE (OUTPATIENT)
Dept: NEUROSURGERY | Facility: CLINIC | Age: 29
End: 2019-05-03

## 2019-05-03 ENCOUNTER — OFFICE VISIT (OUTPATIENT)
Dept: PHYSICAL THERAPY | Facility: REHABILITATION | Age: 29
End: 2019-05-03
Payer: COMMERCIAL

## 2019-05-03 DIAGNOSIS — Z98.890 S/P LUMBAR DISCECTOMY: Primary | ICD-10-CM

## 2019-05-03 PROCEDURE — 97112 NEUROMUSCULAR REEDUCATION: CPT

## 2019-05-03 PROCEDURE — 97110 THERAPEUTIC EXERCISES: CPT

## 2019-05-07 ENCOUNTER — TELEPHONE (OUTPATIENT)
Dept: NEUROSURGERY | Facility: CLINIC | Age: 29
End: 2019-05-07

## 2019-05-08 ENCOUNTER — APPOINTMENT (OUTPATIENT)
Dept: PHYSICAL THERAPY | Facility: REHABILITATION | Age: 29
End: 2019-05-08
Payer: COMMERCIAL

## 2019-05-10 ENCOUNTER — APPOINTMENT (OUTPATIENT)
Dept: PHYSICAL THERAPY | Facility: REHABILITATION | Age: 29
End: 2019-05-10
Payer: COMMERCIAL

## 2019-05-30 ENCOUNTER — TRANSCRIBE ORDERS (OUTPATIENT)
Dept: ADMINISTRATIVE | Facility: HOSPITAL | Age: 29
End: 2019-05-30

## 2019-05-30 DIAGNOSIS — R10.0 ACUTE ABDOMINAL PAIN SYNDROME: Primary | ICD-10-CM

## 2019-05-30 DIAGNOSIS — R19.5 ABNORMAL FECES: ICD-10-CM

## 2019-06-13 ENCOUNTER — TELEPHONE (OUTPATIENT)
Dept: NEUROSURGERY | Facility: CLINIC | Age: 29
End: 2019-06-13

## 2019-06-13 DIAGNOSIS — L76.82 PAIN AT SURGICAL INCISION: Primary | ICD-10-CM

## 2019-06-13 DIAGNOSIS — M51.27 HERNIATION OF INTERVERTEBRAL DISC BETWEEN L5 AND S1: ICD-10-CM

## 2019-06-13 DIAGNOSIS — M54.17 LUMBOSACRAL RADICULOPATHY: ICD-10-CM

## 2019-06-13 DIAGNOSIS — Z98.890 STATUS POST LUMBAR LAMINECTOMY: ICD-10-CM

## 2019-06-19 ENCOUNTER — HOSPITAL ENCOUNTER (OUTPATIENT)
Dept: RADIOLOGY | Age: 29
Discharge: HOME | End: 2019-06-19
Payer: COMMERCIAL

## 2019-06-19 DIAGNOSIS — M51.27 HERNIATION OF INTERVERTEBRAL DISC BETWEEN L5 AND S1: ICD-10-CM

## 2019-06-19 DIAGNOSIS — M54.17 LUMBOSACRAL RADICULOPATHY: ICD-10-CM

## 2019-06-19 DIAGNOSIS — Z98.890 STATUS POST LUMBAR LAMINECTOMY: ICD-10-CM

## 2019-06-19 PROCEDURE — 72148 MRI LUMBAR SPINE W/O DYE: CPT

## 2019-06-21 ENCOUNTER — TELEPHONE (OUTPATIENT)
Dept: NEUROSURGERY | Facility: CLINIC | Age: 29
End: 2019-06-21

## 2019-06-25 LAB
BASOPHILS # BLD AUTO: 27 CELLS/UL (ref 0–200)
BASOPHILS NFR BLD AUTO: 0.4 %
CRP SERPL-MCNC: 2.7 MG/L
EOSINOPHIL # BLD AUTO: 163 CELLS/UL (ref 15–500)
EOSINOPHIL NFR BLD AUTO: 2.4 %
ERYTHROCYTE [DISTWIDTH] IN BLOOD BY AUTOMATED COUNT: 11.8 % (ref 11–15)
HCT VFR BLD AUTO: 44.3 % (ref 35–45)
HGB BLD-MCNC: 14.6 G/DL (ref 11.7–15.5)
LYMPHOCYTES # BLD AUTO: 1843 CELLS/UL (ref 850–3900)
LYMPHOCYTES NFR BLD AUTO: 27.1 %
MCH RBC QN AUTO: 32.1 PG (ref 27–33)
MCHC RBC AUTO-ENTMCNC: 33 G/DL (ref 32–36)
MCV RBC AUTO: 97.4 FL (ref 80–100)
MONOCYTES # BLD AUTO: 551 CELLS/UL (ref 200–950)
MONOCYTES NFR BLD AUTO: 8.1 %
NEUTROPHILS # BLD AUTO: 4216 CELLS/UL (ref 1500–7800)
NEUTROPHILS NFR BLD AUTO: 62 %
PLATELET # BLD AUTO: 217 THOUSAND/UL (ref 140–400)
PMV BLD REES-ECKER: 10.4 FL (ref 7.5–12.5)
RBC # BLD AUTO: 4.55 MILLION/UL (ref 3.8–5.1)
WBC # BLD AUTO: 6.8 THOUSAND/UL (ref 3.8–10.8)

## 2019-06-28 ENCOUNTER — OFFICE VISIT (OUTPATIENT)
Dept: NEUROSURGERY | Facility: CLINIC | Age: 29
End: 2019-06-28
Payer: COMMERCIAL

## 2019-06-28 VITALS
HEIGHT: 59 IN | RESPIRATION RATE: 16 BRPM | HEART RATE: 95 BPM | BODY MASS INDEX: 34.68 KG/M2 | SYSTOLIC BLOOD PRESSURE: 121 MMHG | WEIGHT: 172 LBS | TEMPERATURE: 97.8 F | DIASTOLIC BLOOD PRESSURE: 78 MMHG

## 2019-06-28 DIAGNOSIS — Z48.89 AFTERCARE FOLLOWING SURGERY: Primary | ICD-10-CM

## 2019-06-28 PROCEDURE — 99213 OFFICE O/P EST LOW 20 MIN: CPT | Performed by: NEUROLOGICAL SURGERY

## 2019-07-06 ENCOUNTER — HOSPITAL ENCOUNTER (OUTPATIENT)
Dept: RADIOLOGY | Facility: HOSPITAL | Age: 29
Discharge: HOME | End: 2019-07-06
Payer: COMMERCIAL

## 2019-07-06 DIAGNOSIS — R19.5 ABNORMAL FECES: ICD-10-CM

## 2019-07-06 DIAGNOSIS — R10.0 ACUTE ABDOMINAL PAIN SYNDROME: ICD-10-CM

## 2019-07-06 PROCEDURE — A9537 TC99M MEBROFENIN: HCPCS

## 2019-07-06 PROCEDURE — 78227 HEPATOBIL SYST IMAGE W/DRUG: CPT

## 2019-07-06 RX ADMIN — SINCALIDE 1.5 MCG: 5 INJECTION, POWDER, LYOPHILIZED, FOR SOLUTION INTRAVENOUS at 12:25

## 2019-07-06 RX ADMIN — WATER 1.5 ML: 1 INJECTION INTRAMUSCULAR; INTRAVENOUS; SUBCUTANEOUS at 12:25

## 2019-10-21 ENCOUNTER — PATIENT MESSAGE (OUTPATIENT)
Dept: NEUROSURGERY | Facility: CLINIC | Age: 29
End: 2019-10-21

## 2019-10-24 ENCOUNTER — TELEPHONE (OUTPATIENT)
Dept: NEUROSURGERY | Facility: CLINIC | Age: 29
End: 2019-10-24

## 2019-10-25 NOTE — TELEPHONE ENCOUNTER
Telephoned patient several time this week after responding to [facility] Chart messages in and attempt to determine the status of her pain and compliance with Dr Vern Tay orders  She reports started seeing GITA Ramirez  Female [phone]  Physical Medicine & Rehabilitation  Around 8/27  This Dr has prescribed Diclofenac   She has her next visit 10/31 , Dr discussed giving her shots  I cannot locate records in Epic to understand treatment plan  Van attempt to secure records on Monday then return alirio to patient  Explained it is important for me to know treatment she is receiving from Dr Duard Fleischer to prevent duplication of services and potential errors in double prescribing  Reports Dr Madina Reyna completed an EMG which was normal    Informed patient I will call her next week after record review  Reports she returned to work September 8    She verbalized ad understanding of conversation and agreement     Surgical and Diagnostic Data:    S/p surgery LUMBAR LAMINOTOMY/DISCECTOMY L5-S1, LEFT (Left Spine Lumbar)    2/10 MRI Lumbar spine IMPRESSION:   1   Disc bulge with superimposed central/left paracentral disc extrusion at level L5-S1 with 8 mm cephalad migration and abutment of descending S1 nerve roots, left greater than right  It causes mild canal narrowing at this level    2   Partially imaged left ovarian cyst with internal septa versus 2 adjacent cysts  Recommend dedicated pelvic ultrasound for further evaluation  if indicated        6/30/2019  Post surgery MRI ;IMPRESSION:  Left-sided perineural granulation tissue status post interval left laminotomy L5-S1 Persistent mild bulging annulus, central annular tear L4-5    Inferior endplate edema at L5, likely due to recent surgery rather than infection    Clinical correlation recommended

## 2019-10-28 ENCOUNTER — TELEPHONE (OUTPATIENT)
Dept: NEUROSURGERY | Facility: CLINIC | Age: 29
End: 2019-10-28

## 2019-12-16 ENCOUNTER — TRANSCRIBE ORDERS (OUTPATIENT)
Dept: ADMINISTRATIVE | Facility: HOSPITAL | Age: 29
End: 2019-12-16

## 2019-12-16 DIAGNOSIS — B33.0: Primary | ICD-10-CM

## 2020-01-09 ENCOUNTER — TRANSCRIBE ORDERS (OUTPATIENT)
Dept: RADIOLOGY | Facility: HOSPITAL | Age: 30
End: 2020-01-09

## 2020-01-09 ENCOUNTER — HOSPITAL ENCOUNTER (OUTPATIENT)
Dept: RADIOLOGY | Facility: HOSPITAL | Age: 30
Discharge: HOME | End: 2020-01-09
Payer: COMMERCIAL

## 2020-01-09 ENCOUNTER — TRANSCRIBE ORDERS (OUTPATIENT)
Dept: ADMINISTRATIVE | Facility: HOSPITAL | Age: 30
End: 2020-01-09

## 2020-01-09 ENCOUNTER — TRANSCRIBE ORDERS (OUTPATIENT)
Dept: LAB | Facility: HOSPITAL | Age: 30
End: 2020-01-09

## 2020-01-09 DIAGNOSIS — B33.0: Primary | ICD-10-CM

## 2020-01-09 DIAGNOSIS — R07.9 CHEST PAIN WITH NORMAL EKG: ICD-10-CM

## 2020-01-09 DIAGNOSIS — B33.0: ICD-10-CM

## 2020-01-09 DIAGNOSIS — R07.9 CHEST PAIN WITH NORMAL EKG: Primary | ICD-10-CM

## 2020-01-09 LAB
ATRIAL RATE: 71 BPM
P AXIS: 53 DEGREES
PR INTERVAL: 148 MS
QRS AXIS: 10 DEGREES
QRSD INTERVAL: 76 MS
QT INTERVAL: 384 MS
QTC INTERVAL: 417 MS
T WAVE AXIS: 9 DEGREES
VENTRICULAR RATE: 71 BPM

## 2020-01-09 PROCEDURE — 71046 X-RAY EXAM CHEST 2 VIEWS: CPT

## 2020-01-09 PROCEDURE — 93010 ELECTROCARDIOGRAM REPORT: CPT | Performed by: INTERNAL MEDICINE

## 2020-01-09 PROCEDURE — 93005 ELECTROCARDIOGRAM TRACING: CPT

## 2020-01-18 ENCOUNTER — HOSPITAL ENCOUNTER (OUTPATIENT)
Dept: RADIOLOGY | Facility: HOSPITAL | Age: 30
Discharge: HOME | End: 2020-01-18
Payer: COMMERCIAL

## 2020-01-18 DIAGNOSIS — B33.0: ICD-10-CM

## 2020-01-18 PROCEDURE — 78803 RP LOCLZJ TUM SPECT 1 AREA: CPT

## 2020-01-18 PROCEDURE — A9503 TC99M MEDRONATE: HCPCS

## 2020-02-13 ENCOUNTER — EVALUATION (OUTPATIENT)
Dept: PHYSICAL THERAPY | Facility: REHABILITATION | Age: 30
End: 2020-02-13
Payer: COMMERCIAL

## 2020-02-13 ENCOUNTER — TRANSCRIBE ORDERS (OUTPATIENT)
Dept: PHYSICAL THERAPY | Facility: REHABILITATION | Age: 30
End: 2020-02-13

## 2020-02-13 DIAGNOSIS — Z98.890 STATUS POST LUMBAR LAMINECTOMY: ICD-10-CM

## 2020-02-13 DIAGNOSIS — M54.2 NECK PAIN: Primary | ICD-10-CM

## 2020-02-13 PROCEDURE — 97163 PT EVAL HIGH COMPLEX 45 MIN: CPT | Performed by: PHYSICAL THERAPIST

## 2020-02-13 RX ORDER — GABAPENTIN 300 MG/1
CAPSULE ORAL
Refills: 0 | COMMUNITY
Start: 2019-12-03

## 2020-02-13 RX ORDER — BACLOFEN 20 MG/1
TABLET ORAL
Refills: 1 | COMMUNITY
Start: 2019-12-05

## 2020-02-13 RX ORDER — MELOXICAM 15 MG/1
15 TABLET ORAL DAILY
COMMUNITY

## 2020-02-13 RX ORDER — HYOSCYAMINE SULFATE 0.125 MG
TABLET ORAL
Refills: 0 | COMMUNITY
Start: 2019-11-07

## 2020-02-13 RX ORDER — ESOMEPRAZOLE MAGNESIUM 10 MG/1
GRANULE, FOR SUSPENSION, EXTENDED RELEASE ORAL
COMMUNITY

## 2020-02-13 NOTE — PROGRESS NOTES
PT Evaluation     Today's date: 2020  Patient name: Taylor Todd  : 1990  MRN: [id number]  Referring provider: Yoana Osei PA-C  Dx:   Encounter Diagnosis     ICD-10-CM    1  Neck pain M54 2    2  Status post lumbar laminectomy [id number] [id number]        Start Time: 08  Stop Time: 0940  Total time in clinic (min): 45 minutes    Assessment  Assessment details: Taylor Todd is a 26 y o  female who presents to therapy for L>R sided neck pain and headaches with LUE radicular sx's, and low back pain following L5-S1 laminectomy performed Fe of last year  Examination findings include limited and painful cervical and lumbar AROM, decreased light touch in the LUE along the C6 dermatome, decreased hip strength, and LE ROM impairments  In addition pt has headaches and report of changes in swallowing and breathing  Pt has not yet had a head CT - has neuro appt in 1 month  Pt noted that her insurance will not approve a CT until pt has participated in PT  Pt will benefit from skilled outpatient PT to improve neck and lumbar ROM, increase LE strength and core stability, reduce pain and improve function  Therapist explained to pt: findings of IE, rehab diagnosis, and POC  Pt-centered goals reviewed and confirmed by pt  Pt also expressed satisfaction that their current concerns were addressed at the end of the session  Impairments: abnormal or restricted ROM, activity intolerance, impaired physical strength, lacks appropriate home exercise program and pain with function    Symptom irritability: highBarriers to therapy: None   Understanding of Dx/Px/POC: good   Prognosis: fair    Goals  Short term goals: to be met in 2 weeks:  Pt independent with initial HEP, rationale, technique and frequency, for ROM and pain control  Pt will report at least a 25% reduction in subjective pain complaints/symptoms to manage ADLs with reduced pain    Pt will report an improvement in max pain score by at least 2 points on the numeric pain rating scale (NPRS) indicating a clinically important change and overall decrease in pain  Long term goals: to be met in 4-6 weeks    Short term goals: to be met in 3-4 weeks  Pt independent with initial HEP, rationale, technique and frequency, for ROM and pain control  Pt will report at least a 25% reduction in subjective pain complaints/symptoms to better manage ADLs and household chores  Improve max pain level by 2 points on the numeric pain rating scale indicating a clinically significant reduction in pain level  Educate pt on proper lifting mechanics and neutral spine 2-4 visits  Pt will be able to effectively isolate and recruit the TrA without Valsalva or global abdominal contraction to improve lumbopelvic stability  Pt will manage 10-15 minutes of continuous activity for general conditioning and endorphin release for pain management using bike or treadmill    Long term goals: to be met by discharge  Pt will report at least a 75% reduction in subjective pain complaints/symptoms to manage ADLs with reduced pain  Pt will have full and pain free lumbar ROM to better manage ADLs and household chores  Pt will have WFL and pain free neck ROM to be able to perform ADLs and household chores  Improve B hip abduction MMT to a 4/5 or greater for improved lumbopelvic stability  Pt will be able to sleep through the night (6-8 hours) without waking secondary to pain  Pt will be able to perform ADLs, iADLS, and work duties without pain or restriction indicating return to PLOF    Pt will improve FOTO score to better then expected outcome indicating an overall improvement in pain and function   Achieve pts therapy goal: get better and not feel pain      Plan  Patient would benefit from: skilled physical therapy  Planned modality interventions: TENS, thermotherapy: hydrocollator packs, traction, ultrasound, cryotherapy and low level laser therapy  Planned therapy interventions: body mechanics training, therapeutic training, therapeutic exercise, therapeutic activities, stretching, strengthening, neuromuscular re-education, patient education, home exercise program, functional ROM exercises, flexibility, manual therapy, Gibson taping, joint mobilization and balance  Frequency: 2-3x/week  Duration in weeks: 8  Treatment plan discussed with: patient        Subjective Evaluation    History of Present Illness  Mechanism of injury: surgery  Mechanism of injury: Pt presents with neck and low back pain  States neck pain started in Nov, started with pressure in the chest and has pain in the L arm and also in the upper back  No known cause  Per pt, Pt had chest x-ray, bone scan, both normal  Pt was given script to come to PT  Script from neurosurgery (same doc that pt sees for her low back)  Pt has upcoming appt with cariology  Pt states she has pain most days and has pain up into the back of the head  Pain is limiting sleep  (+) headaches, can last for 3-4 days  This started in November  Denies lightheadedness and vision changes but does feel pain behind the eyes  Denies tinnitus, denies sound sensitivity  Does report sensitivity to light  Has not had any CT scan or MRI of the head or neck  Has an upcoming with neurology  Pt states her insurance wont pay for the CT scan of her head right now  Pt reports n/t in B UE, but states she has more pain in the L side  N/t to the level of the fingers  Denies UE weakness  Denies bowel/bladder changes  Pt also had back surgery, laminectomy performed 2/2019  States her back has not improved since having surgery  Pt had therapy for the low back after surgery, but then stopper per MD orders  Pt states she was having pain so the doctor had her stop therapy  Pt states she has pain into the L buttock, no RLE sx's right now but does get RLE sx's from time to time  Denies LE numbness/tingling, reports only pain  Denies LE weakness  Pain is across low back   MRI in June of 2019 showed  L4-L5:  Mild degenerative disc desiccation without loss of stature  Mild bulging annulus  Central annular tear  Mild facet arthrosis  No stenosis  Stable      L5-S1:  Mild degenerative spondylosis  Status post interval performance of left laminotomy  No evidence of recurrent disc herniation  Left ventral granulation tissue surrounds the left S1 nerve root  Quality of life: good    Pain  Pain scale: neck = 7, back = 5  Pain scale at lowest: neck = 5, baclk = 3  Pain scale at highest: neck = 8 back = 8  Location: L>R side of neck, head, chest and L arm, B low back L>R buttock  Quality: sharp  Progression: no change    Social Support  Stairs in house: yes   Lives in: multiple-level home  Lives with: spouse and young children    Employment status: working (cleaning)  Hand dominance: right      Diagnostic Tests  X-ray: normal (chest xray)  Bone scan: normal  Treatments  Previous treatment: medication (PT for the back, had PT for the L shoulder years ago)  Current treatment: medication  Patient Goals  Patient goals for therapy: decreased pain  Patient goal: get better and not feel pain        Objective     Concurrent Complaints  Positive for night pain, disturbed sleep, headaches, trouble swallowing, difficulty breathing, shortness of breath and respiratory pain  Negative for dizziness, faints, nausea/motion sickness, tinnitus, visual change, bladder dysfunction, bowel dysfunction and saddle (S4) numbness    Palpation     Additional Palpation Details  TTP over lumbar paraspinals and B glutes    Hypertonic over lumbar paraspinals L>R    Neurological Testing     Sensation   Cervical/Thoracic   Left   Diminished: light touch    Right   Intact: light touch    Comments   Left light touch: thumb and middle digit       Lumbar   Left   Intact: light touch    Right   Intact: light touch    Reflexes   Left   Biceps (C5/C6): normal (2+)  Brachioradialis (C6): normal (2+)  Patellar (L4): normal (2+)  Achilles (S1): normal (2+)  Clonus sign: negative    Right   Biceps (C5/C6): normal (2+)  Brachioradialis (C6): normal (2+)  Patellar (L4): normal (2+)  Achilles (S1): normal (2+)  Clonus sign: negative    Active Range of Motion   Cervical/Thoracic Spine       Cervical    Flexion: 30 degrees  with pain  Extension: 35 degrees     with pain  Left lateral flexion: 26 degrees     with pain  Right lateral flexion: 30 degrees     with pain  Left rotation: 54 degrees with pain  Right rotation: 54 degrees           Lumbar   Flexion:  with pain Restriction level: moderate  Extension:  with pain Restriction level: moderate  Left lateral flexion:  Restriction level: minimal  Right lateral flexion:  Restriction level: minimal  Left rotation:  Restriction level: minimal  Right rotation:  Restriction level: minimal    Additional Active Range of Motion Details  Hinging at L4-5 with lumbar AROM  Pain at L4-5 and over incision site    Strength/Myotome Testing   Cervical Spine     Left   Interossei strength (t1): 5  Levator scapulae (C4): 5    Right   Interossei strength (t1): 5  Levator scapulae (C4): 5    Left Shoulder     Planes of Motion   Abduction: 5     Isolated Muscles   Levator scapulae: 5     Right Shoulder     Planes of Motion   Abduction: 5     Isolated Muscles   Levator scapulae: 5     Left Elbow   Flexion: 5  Extension: 5    Right Elbow   Flexion: 5  Extension: 5    Left Wrist/Hand   Wrist extension: 5  Wrist flexion: 5  Thumb extension: 5    Right Wrist/Hand   Wrist extension: 5  Wrist flexion: 5  Thumb extension: 5    Left Hip   Planes of Motion   Flexion: 5  Abduction: 3-    Right Hip   Planes of Motion   Flexion: 5  Abduction: 3-    Left Knee   Flexion: 5  Extension: 5    Right Knee   Flexion: 5  Extension: 5    Left Ankle/Foot   Dorsiflexion: 5  Plantar flexion: 5  Great toe extension: 5    Right Ankle/Foot   Dorsiflexion: 5  Plantar flexion: 5  Great toe extension: 5    Tests     Lumbar     Left   Negative slump test      Right Negative slump test      Left Hip   Negative LINDA and FADIR  Right Hip   Negative LINDA and FADIR  General Comments:      Hip Comments   Mild to moderate hamstring tightness  Hip ER/IR ROM WNL bilat    Cervical/Thoracic Comments  TTP B upper traps, SO and mid trap region  Neuro Exam:     Headaches   Patient reports headaches: Yes         Flowsheet Rows      Most Recent Value   PT/OT G-Codes   Current Score  39   Projected Score  51             Precautions: L5-S1 laminectomy 2/2019, GERD    FOTO   LBP  2/13 = 39/51    Neck  2/13 = 39/55      Manual  2/13                         Assess ULTT NV            Assess  strength NV            Assess vitals NV            Administer HEP NV                Exercise Diary  2/13            Bike             Neck AROM             Supine chin tucks                                       TrA iso             TrA with clamshells             glute set with bridges             Standing hip abd with band                                                                                                                                                                Modalities

## 2020-02-13 NOTE — LETTER
2020    Mary Castro PA-C  [address] [facility]  [address]  [address] [facility]    Patient: Argentina Morton   YOB: 1990   Date of Visit: 2020     Encounter Diagnosis     ICD-10-CM    1  Neck pain M54 2    2  Status post lumbar laminectomy [id number]        Dear Dr Anderson Pea: Thank you for your recent referral of Argentina Morton  Please review the attached evaluation summary from Gisela's recent visit  Please verify that you agree with the plan of care by signing the attached order  If you have any questions or concerns, please do not hesitate to call  I sincerely appreciate the opportunity to share in the care of one of your patients and hope to have another opportunity to work with you in the near future  Sincerely,    Abram Abdul, PT      Referring Provider:      I certify that I have read the below Plan of Care and certify the need for these services furnished under this plan of treatment while under my care  Mary Castro PA-C  [address] [facility]  [address]  [address] [place] [address]  VIA Facsimile: [phone]          PT Evaluation     Today's date: 2020  Patient name: Argentina Morton  : 1990  MRN: [id number]  Referring provider: Eloina Aguirre PA-C  Dx:   Encounter Diagnosis     ICD-10-CM    1  Neck pain M54 2    2  Status post lumbar laminectomy [id number]        Start Time: 0855  Stop Time: 0940  Total time in clinic (min): 45 minutes    Assessment  Assessment details: Argentina Morton is a 26 y o  female who presents to therapy for L>R sided neck pain and headaches with LUE radicular sx's, and low back pain following L5-S1 laminectomy performed Feb of last year  Examination findings include limited and painful cervical and lumbar AROM, decreased light touch in the LUE along the C6 dermatome, decreased hip strength, and LE ROM impairments  In addition pt has headaches and report of changes in swallowing and breathing  Pt has not yet had a head CT - has neuro appt in 1 month  Pt noted that her insurance will not approve a CT until pt has participated in PT  Pt will benefit from skilled outpatient PT to improve neck and lumbar ROM, increase LE strength and core stability, reduce pain and improve function  Therapist explained to pt: findings of IE, rehab diagnosis, and POC  Pt-centered goals reviewed and confirmed by pt  Pt also expressed satisfaction that their current concerns were addressed at the end of the session  Impairments: abnormal or restricted ROM, activity intolerance, impaired physical strength, lacks appropriate home exercise program and pain with function    Symptom irritability: highBarriers to therapy: None   Understanding of Dx/Px/POC: good   Prognosis: fair    Goals  Short term goals: to be met in 2 weeks:  Pt independent with initial HEP, rationale, technique and frequency, for ROM and pain control  Pt will report at least a 25% reduction in subjective pain complaints/symptoms to manage ADLs with reduced pain  Pt will report an improvement in max pain score by at least 2 points on the numeric pain rating scale (NPRS) indicating a clinically important change and overall decrease in pain  Long term goals: to be met in 4-6 weeks    Short term goals: to be met in 3-4 weeks  Pt independent with initial HEP, rationale, technique and frequency, for ROM and pain control  Pt will report at least a 25% reduction in subjective pain complaints/symptoms to better manage ADLs and household chores  Improve max pain level by 2 points on the numeric pain rating scale indicating a clinically significant reduction in pain level  Educate pt on proper lifting mechanics and neutral spine 2-4 visits  Pt will be able to effectively isolate and recruit the TrA without Valsalva or global abdominal contraction to improve lumbopelvic stability    Pt will manage 10-15 minutes of continuous activity for general conditioning and endorphin release for pain management using bike or treadmill    Long term goals: to be met by discharge  Pt will report at least a 75% reduction in subjective pain complaints/symptoms to manage ADLs with reduced pain  Pt will have full and pain free lumbar ROM to better manage ADLs and household chores  Pt will have WFL and pain free neck ROM to be able to perform ADLs and household chores  Improve B hip abduction MMT to a 4/5 or greater for improved lumbopelvic stability  Pt will be able to sleep through the night (6-8 hours) without waking secondary to pain  Pt will be able to perform ADLs, iADLS, and work duties without pain or restriction indicating return to PLOF  Pt will improve FOTO score to better then expected outcome indicating an overall improvement in pain and function   Achieve pts therapy goal: get better and not feel pain      Plan  Patient would benefit from: skilled physical therapy  Planned modality interventions: TENS, thermotherapy: hydrocollator packs, traction, ultrasound, cryotherapy and low level laser therapy  Planned therapy interventions: body mechanics training, therapeutic training, therapeutic exercise, therapeutic activities, stretching, strengthening, neuromuscular re-education, patient education, home exercise program, functional ROM exercises, flexibility, manual therapy, Gibson taping, joint mobilization and balance  Frequency: 2-3x/week  Duration in weeks: 8  Treatment plan discussed with: patient        Subjective Evaluation    History of Present Illness  Mechanism of injury: surgery  Mechanism of injury: Pt presents with neck and low back pain  States neck pain started in Nov, started with pressure in the chest and has pain in the L arm and also in the upper back  No known cause  Per pt, Pt had chest x-ray, bone scan, both normal  Pt was given script to come to PT  Script from neurosurgery (same doc that pt sees for her low back)   Pt has upcoming appt with cariology  Pt states she has pain most days and has pain up into the back of the head  Pain is limiting sleep  (+) headaches, can last for 3-4 days  This started in November  Denies lightheadedness and vision changes but does feel pain behind the eyes  Denies tinnitus, denies sound sensitivity  Does report sensitivity to light  Has not had any CT scan or MRI of the head or neck  Has an upcoming with neurology  Pt states her insurance wont pay for the CT scan of her head right now  Pt reports n/t in B UE, but states she has more pain in the L side  N/t to the level of the fingers  Denies UE weakness  Denies bowel/bladder changes  Pt also had back surgery, laminectomy performed 2/2019  States her back has not improved since having surgery  Pt had therapy for the low back after surgery, but then stopper per MD orders  Pt states she was having pain so the doctor had her stop therapy  Pt states she has pain into the L buttock, no RLE sx's right now but does get RLE sx's from time to time  Denies LE numbness/tingling, reports only pain  Denies LE weakness  Pain is across low back  MRI in June of 2019 showed  L4-L5:  Mild degenerative disc desiccation without loss of stature  Mild bulging annulus  Central annular tear  Mild facet arthrosis  No stenosis  Stable      L5-S1:  Mild degenerative spondylosis  Status post interval performance of left laminotomy  No evidence of recurrent disc herniation  Left ventral granulation tissue surrounds the left S1 nerve root  Quality of life: good    Pain  Pain scale: neck = 7, back = 5  Pain scale at lowest: neck = 5, baclk = 3  Pain scale at highest: neck = 8 back = 8    Location: L>R side of neck, head, chest and L arm, B low back L>R buttock  Quality: sharp  Progression: no change    Social Support  Stairs in house: yes   Lives in: multiple-level home  Lives with: spouse and young children    Employment status: working (cleaning)  Hand dominance: right      Diagnostic Tests  X-ray: normal (chest xray)  Bone scan: normal  Treatments  Previous treatment: medication (PT for the back, had PT for the L shoulder years ago)  Current treatment: medication  Patient Goals  Patient goals for therapy: decreased pain  Patient goal: get better and not feel pain        Objective     Concurrent Complaints  Positive for night pain, disturbed sleep, headaches, trouble swallowing, difficulty breathing, shortness of breath and respiratory pain  Negative for dizziness, faints, nausea/motion sickness, tinnitus, visual change, bladder dysfunction, bowel dysfunction and saddle (S4) numbness    Palpation     Additional Palpation Details  TTP over lumbar paraspinals and B glutes    Hypertonic over lumbar paraspinals L>R    Neurological Testing     Sensation   Cervical/Thoracic   Left   Diminished: light touch    Right   Intact: light touch    Comments   Left light touch: thumb and middle digit       Lumbar   Left   Intact: light touch    Right   Intact: light touch    Reflexes   Left   Biceps (C5/C6): normal (2+)  Brachioradialis (C6): normal (2+)  Patellar (L4): normal (2+)  Achilles (S1): normal (2+)  Clonus sign: negative    Right   Biceps (C5/C6): normal (2+)  Brachioradialis (C6): normal (2+)  Patellar (L4): normal (2+)  Achilles (S1): normal (2+)  Clonus sign: negative    Active Range of Motion   Cervical/Thoracic Spine       Cervical    Flexion: 30 degrees  with pain  Extension: 35 degrees     with pain  Left lateral flexion: 26 degrees     with pain  Right lateral flexion: 30 degrees     with pain  Left rotation: 54 degrees with pain  Right rotation: 54 degrees           Lumbar   Flexion:  with pain Restriction level: moderate  Extension:  with pain Restriction level: moderate  Left lateral flexion:  Restriction level: minimal  Right lateral flexion:  Restriction level: minimal  Left rotation:  Restriction level: minimal  Right rotation:  Restriction level: minimal    Additional Active Range of Motion Details  Hinging at L4-5 with lumbar AROM  Pain at L4-5 and over incision site    Strength/Myotome Testing   Cervical Spine     Left   Interossei strength (t1): 5  Levator scapulae (C4): 5    Right   Interossei strength (t1): 5  Levator scapulae (C4): 5    Left Shoulder     Planes of Motion   Abduction: 5     Isolated Muscles   Levator scapulae: 5     Right Shoulder     Planes of Motion   Abduction: 5     Isolated Muscles   Levator scapulae: 5     Left Elbow   Flexion: 5  Extension: 5    Right Elbow   Flexion: 5  Extension: 5    Left Wrist/Hand   Wrist extension: 5  Wrist flexion: 5  Thumb extension: 5    Right Wrist/Hand   Wrist extension: 5  Wrist flexion: 5  Thumb extension: 5    Left Hip   Planes of Motion   Flexion: 5  Abduction: 3-    Right Hip   Planes of Motion   Flexion: 5  Abduction: 3-    Left Knee   Flexion: 5  Extension: 5    Right Knee   Flexion: 5  Extension: 5    Left Ankle/Foot   Dorsiflexion: 5  Plantar flexion: 5  Great toe extension: 5    Right Ankle/Foot   Dorsiflexion: 5  Plantar flexion: 5  Great toe extension: 5    Tests     Lumbar     Left   Negative slump test      Right   Negative slump test      Left Hip   Negative LINDA and FADIR  Right Hip   Negative LINDA and FADIR  General Comments:      Hip Comments   Mild to moderate hamstring tightness  Hip ER/IR ROM WNL bilat    Cervical/Thoracic Comments  TTP B upper traps, SO and mid trap region  Neuro Exam:     Headaches   Patient reports headaches: Yes         Flowsheet Rows      Most Recent Value   PT/OT G-Codes   Current Score  39   Projected Score  51             Precautions: L5-S1 laminectomy 2/2019, GERD    FOTO   LBP  2/13 = 39/51    Neck  2/13 = 39/55      Manual  2/13                         Assess ULTT NV            Assess  strength NV            Assess vitals NV            Administer HEP NV                Exercise Diary  2/13            Bike             Neck AROM Supine chin tucks                                       TrA iso             TrA with clamshells             glute set with bridges             Standing hip abd with band                                                                                                                                                                Modalities

## 2020-02-14 NOTE — PROGRESS NOTES
Consultation - Cardiology     Niya Almonte 26 y o  female MRN: [id number]    Assessment/Plan:    1  CP with abnormal EKG  EKG showed T wave inversions in III, V3  Will order stress echo to evaluate for underlying CAD as cause of her chest discomfort, which has both typical and atypical features  2  KELLY  Not using CPAP machine  1  KELLY (obstructive sleep apnea)     2  Chest pain, unspecified type  Echo stress test w contrast if indicated   3  Abnormal EKG  Echo stress test w contrast if indicated       HPI: 26 y o  female with KELLY who is here for evaluation of abnormal EKG and chest discomfort  EKG from 20 which I personally reviewed showed SR, sinus arrhythmia, nonspecific T wave inversions in III, V3  Poor R wave progression  She reports she has been noting chest pain since , sometimes comes and goes, sometimes lasts all day  Not necessarily related to exertion  Can occur at rest  Hurts more with deep inspiration or moving onto her side  She reports she feels fatigued  No orthopnea, uses 1 pillow to sleep, no PND  No LE edema  She has some numbness in both her hands, but worse on the left  She is not doing any formal exercise, but reports at work she walks a lot and goes up stairs  Does feel that it worsens with more exertion  She reports she has gained about 15 lbs over last month  Paternal grandfather had heart problems, paternal grandmother  of MI  Father has DM, mother has diabetes  Sister has diabetes, may also have HTN  Review of Systems:    Review of Systems   Constitution: Positive for weight gain  Negative for chills, decreased appetite, diaphoresis, fever, malaise/fatigue, night sweats and weight loss  HENT: Negative for ear pain, hearing loss, hoarse voice, nosebleeds, sore throat and tinnitus  Eyes: Negative for blurred vision and pain  Cardiovascular: Positive for chest pain   Negative for claudication, cyanosis, dyspnea on exertion, irregular heartbeat, leg swelling, near-syncope, orthopnea, palpitations, paroxysmal nocturnal dyspnea and syncope  Respiratory: Positive for snoring  Negative for cough, hemoptysis, shortness of breath, sleep disturbances due to breathing, sputum production and wheezing  Hematologic/Lymphatic: Negative for adenopathy and bleeding problem  Does not bruise/bleed easily  Skin: Positive for rash  Negative for color change, dry skin, flushing, itching and poor wound healing  Musculoskeletal: Positive for back pain and neck pain  Negative for arthritis, falls, joint pain, muscle cramps, muscle weakness and myalgias  Gastrointestinal: Positive for diarrhea and nausea  Negative for abdominal pain, constipation, dysphagia, heartburn, hematemesis, hematochezia, melena and vomiting  Genitourinary: Negative for dysuria, frequency, hematuria, hesitancy, non-menstrual bleeding and urgency  Neurological: Positive for headaches, numbness and paresthesias  Negative for excessive daytime sleepiness, dizziness, focal weakness, light-headedness, loss of balance, tremors, vertigo and weakness  Psychiatric/Behavioral: Negative for altered mental status, depression and memory loss  The patient has insomnia  The patient is not nervous/anxious  Allergic/Immunologic: Positive for environmental allergies  Negative for persistent infections           Active Problems:     Patient Active Problem List   Diagnosis    KELLY (obstructive sleep apnea)    Periodic limb movements of sleep    Diffuse abdominal pain    Irritable bowel syndrome with diarrhea    Ambulatory dysfunction    Back pain of lumbosacral region with radiculopathy    Lumbosacral radiculopathy    Herniation of intervertebral disc between L5 and S1    Fluid in endometrial cavity    Status post lumbar laminectomy    Chronic pain syndrome    Aftercare following surgery       Historical Information   Past Medical History:   Diagnosis Date    GERD (gastroesophageal reflux disease)     Obesity      Past Surgical History:   Procedure Laterality Date    HERNIA REPAIR      POSTERIOR LAMINECTOMY THORACIC AND LUMBAR SPINE Left 2/13/2019    Procedure: LUMBAR LAMINOTOMY/DISCECTOMY L5-S1, LEFT;  Surgeon: Yennifer Jaramillo MD;  [facility];  Service: Neurosurgery    TN COLONOSCOPY FLX DX W/COLLJ [facility] WHEN PFRMD N/A 12/20/2018    Procedure: COLONOSCOPY;  Surgeon: Vale Huston MD;  [facility]; Service: Gastroenterology    TN ESOPHAGOGASTRODUODENOSCOPY TRANSORAL DIAGNOSTIC N/A 12/20/2018    Procedure: ESOPHAGOGASTRODUODENOSCOPY (EGD); Surgeon: Vale Huston MD;  [facility];   Service: Gastroenterology    TUBAL LIGATION       Social History     Substance and Sexual Activity   Alcohol Use No     Social History     Substance and Sexual Activity   Drug Use No     Social History     Tobacco Use   Smoking Status Never Smoker   Smokeless Tobacco Never Used       Family History:   Family History   Problem Relation Age of Onset    Diabetes Mother     Diabetes Father     Diabetes Sister     Coronary artery disease Paternal Grandmother        Allergies   Allergen Reactions    Septra [Sulfamethoxazole-Trimethoprim] Rash and Shortness Of Breath    Ciprofloxacin Hcl     Zosyn [Piperacillin Sod-Tazobactam So] Hives and Rash         Current Outpatient Medications:     acetaminophen (TYLENOL) 325 mg tablet, Take 3 tablets (975 mg total) by mouth every 8 (eight) hours (Patient taking differently: Take 975 mg by mouth as needed ), Disp: 30 tablet, Rfl: 0    baclofen 20 mg tablet, TAKE 1 TABLET BY MOUTH TWICE A DAY AS NEEDED FOR PAIN/SPASM, Disp: , Rfl: 1    esomeprazole (NexIUM) 10 MG packet, Take by mouth every morning before breakfast, Disp: , Rfl:     gabapentin (NEURONTIN) 300 mg capsule, TAKE 1 TABLET BY MOUTH IN THE MORNING AND TAKE 2 TABLETS AT BEDTIME, Disp: , Rfl: 0    hyoscyamine (ANASPAZ,LEVSIN) 0 125 MG tablet, TAKE 1 TABLET BY MOUTH 3 TIMES A DAY AS NEEDED FOR LOOSE BOWELS, Disp: , Rfl: 0    ibuprofen (MOTRIN) 600 mg tablet, Take by mouth as needed, Disp: , Rfl:     meloxicam (MOBIC) 15 mg tablet, Take 15 mg by mouth daily, Disp: , Rfl:     omeprazole (PriLOSEC) 20 mg delayed release capsule, Take 1 tablet by mouth daily, Disp: , Rfl:     baclofen 10 mg tablet, Take one tablet by mouth every 8 hours for back and leg spasm (Patient not taking: Reported on 2/17/2020), Disp: 21 tablet, Rfl: 0    gabapentin (NEURONTIN) 100 mg capsule, Take 1 capsule (100 mg total) by mouth 3 (three) times a day (Patient not taking: Reported on 2/17/2020), Disp: 90 capsule, Rfl: 0    Objective   Vitals:   Vitals:    02/17/20 1516   BP: 112/72   BP Location: Right arm   Patient Position: Sitting   Cuff Size: Standard   Pulse: 88   Weight: 82 5 kg (181 lb 12 8 oz)   Height: 4' 11" (1 499 m)          Physical Exam:     GEN: Alert and oriented x 3, in no acute distress  Well appearing and well nourished  HEENT: Sclera anicteric, conjunctivae pink, mucous membranes moist  Oropharynx clear  NECK: Supple, no carotid bruits, no significant JVD  Trachea midline, no thyromegaly  HEART: Regular rhythm, normal S1 and S2, no murmurs, clicks, gallops or rubs  PMI nondisplaced, no thrills  LUNGS: Clear to auscultation bilaterally; no wheezes, rales, or rhonchi  No increased work of breathing or signs of respiratory distress  ABDOMEN: Soft, nontender, nondistended, normoactive bowel sounds  EXTREMITIES: Skin warm and well perfused, no clubbing, cyanosis, or edema  NEURO: No focal findings  Normal gait  Normal speech  Mood and affect normal    SKIN: Normal without suspicious lesions on exposed skin      Lab Results:     No results found for: HGBA1C  No results found for: CHOL  No results found for: HDL  No results found for: LDLCALC  No results found for: TRIG  No results found for: CHOLHDL

## 2020-02-17 ENCOUNTER — OFFICE VISIT (OUTPATIENT)
Dept: CARDIOLOGY CLINIC | Facility: CLINIC | Age: 30
End: 2020-02-17
Payer: COMMERCIAL

## 2020-02-17 ENCOUNTER — OFFICE VISIT (OUTPATIENT)
Dept: PHYSICAL THERAPY | Facility: REHABILITATION | Age: 30
End: 2020-02-17
Payer: COMMERCIAL

## 2020-02-17 VITALS
WEIGHT: 181.8 LBS | BODY MASS INDEX: 36.65 KG/M2 | SYSTOLIC BLOOD PRESSURE: 112 MMHG | DIASTOLIC BLOOD PRESSURE: 72 MMHG | HEART RATE: 88 BPM | HEIGHT: 59 IN

## 2020-02-17 DIAGNOSIS — R07.9 CHEST PAIN, UNSPECIFIED TYPE: ICD-10-CM

## 2020-02-17 DIAGNOSIS — G47.33 OSA (OBSTRUCTIVE SLEEP APNEA): Primary | ICD-10-CM

## 2020-02-17 DIAGNOSIS — M54.2 NECK PAIN: Primary | ICD-10-CM

## 2020-02-17 DIAGNOSIS — Z98.890 STATUS POST LUMBAR LAMINECTOMY: ICD-10-CM

## 2020-02-17 DIAGNOSIS — R94.31 ABNORMAL EKG: ICD-10-CM

## 2020-02-17 PROCEDURE — 97140 MANUAL THERAPY 1/> REGIONS: CPT | Performed by: PHYSICAL THERAPIST

## 2020-02-17 PROCEDURE — 97112 NEUROMUSCULAR REEDUCATION: CPT | Performed by: PHYSICAL THERAPIST

## 2020-02-17 PROCEDURE — 99204 OFFICE O/P NEW MOD 45 MIN: CPT | Performed by: INTERNAL MEDICINE

## 2020-02-17 NOTE — PROGRESS NOTES
Daily Note     Today's date: 2020  Patient name: Nitza Rico  : 1990  MRN: [id number]  Referring provider: Serena Guerrero PA-C  Dx:   Encounter Diagnosis     ICD-10-CM    1  Neck pain M54 2    2  Status post lumbar laminectomy Z98 890        Start Time: 1130  Stop Time: 1216  Total time in clinic (min): 46 minutes    Subjective: Pt states the neck is tight today  Objective: See treatment diary below  (+) median and radial ULTT on the LUE, (-) ulnar bias  (-) ULTT all biases on the RUE  (-) alar ligament testing  (-) transverse ligament testing, but noted hypermoblity     strength  R = 55 lbs  L = 40 lbs    HR = 94  02 = 97%  /90    Assessment: Pt presents for first follow up after eval  Further assessment completed: vitals WNL,  strength as above, and positive LUE ULTT median and radial biases  Pt has TTP over B cervical transverse processes  Alar and transverse ligament testing was negative, but noted increased mobility with transverse ligament testing  Pt also has hypertonicity and tenderness of the neck musculature  She responded well to gentle SOR and STM to the cervical paraspinals  Instructed pt in HEP to address cervical and lumbopelvic stability  Pt given handout of HEP  Pt noted increased lumbar pain with laying supine, but responded well to laying supine with pillow under incision site  Pt will benefit from continued skilled outpatient PT to improve strength, to address therapy goals, to reduce pain, and improve function  Plan: Continue per plan of care  Progress treatment as tolerated         Precautions: L5-S1 laminectomy 2019, GERD    FOTO   LBP   = 39/51    Neck   = 39/55      Manual             Gentle SOR and STM to cervical paraspinals  DS 5'           Assess ULTT NV perfomred           Assess  strength NV perfomred           Assess vitals NV performed           Administer HEP NV DS               Exercise Diary   Bike  L1 7'           Seated neck iso - rotation, side bend  5"x10 each           Supine chin tucks  NV                                     TrA iso  5"x20           TrA with clamshells  3" 1x15 each           glute set with bridges  NV           Standing hip abd with band  NV                                                                                                                                                              Modalities

## 2020-02-17 NOTE — LETTER
February 17, 2020     Patient: Nitza Rico   YOB: 1990   Date of Visit: 2/17/2020       To Whom it May Concern:    Nitza Rico is under my professional care  She was seen in my office on 2/17/2020  She may return to work 2/18/2020  If you have any questions or concerns, please don't hesitate to call           Sincerely,          Dipika Gallegos MD        CC: Nitza Jacky

## 2020-02-20 ENCOUNTER — OFFICE VISIT (OUTPATIENT)
Dept: PHYSICAL THERAPY | Facility: REHABILITATION | Age: 30
End: 2020-02-20
Payer: COMMERCIAL

## 2020-02-20 DIAGNOSIS — M54.2 NECK PAIN: Primary | ICD-10-CM

## 2020-02-20 DIAGNOSIS — Z98.890 STATUS POST LUMBAR LAMINECTOMY: ICD-10-CM

## 2020-02-20 PROCEDURE — 97140 MANUAL THERAPY 1/> REGIONS: CPT | Performed by: PHYSICAL THERAPIST

## 2020-02-20 PROCEDURE — 97112 NEUROMUSCULAR REEDUCATION: CPT | Performed by: PHYSICAL THERAPIST

## 2020-02-20 NOTE — PROGRESS NOTES
Daily Note     Today's date: 2020  Patient name: Yogesh Hunter  : 1990  MRN: [id number]  Referring provider: Ruby Halsted, PA-C  Dx:   Encounter Diagnosis     ICD-10-CM    1  Neck pain M54 2    2  Status post lumbar laminectomy Z98 890        Start Time: 1146  Stop Time: 1230  Total time in clinic (min): 44 minutes    Subjective: Pt noted more R sided neck pain and stiffness today  States she had some TrA DOMS after last visit  Denies a headache today  Feels like the neck exercises are helping and felt better yesterday  Objective: See treatment diary below      Assessment: Pt responded favorably to MHP to to neck during Nu[employer]  Reviewed neck isometrics with pt  Pt demonstrated good form  Performed manuals in sitting due to pts poor tolerance to laying supine secondary to pain with pressure on her scar  Pt presented with more R sided neck spasms today that improved with STM  Added lumbar scar desensitization  Pt very tender over the proximal scar, but there was no palpable muscle tightness or scar tightness over this area  Instructed pt to have her spouse tap and massage this area as well to work on desensitizing this area  Pt will benefit from continued skilled outpatient PT to improve strength, to address therapy goals, to reduce pain, and improve function  Plan: Continue per plan of care  Progress treatment as tolerated         Precautions: L5-S1 laminectomy 2019, GERD    FOTO   LBP   = 39/51    Neck   = 39/55      Manual            Gentle SOR and STM to cervical paraspinals  DS 5' Seated STM upper traps + c/s paraspinals - DS 8'          Assess ULTT NV perfomred           Assess  strength NV perfomred           Assess vitals NV performed           Administer HEP NV DS           Scar desensitization, seated with hands supported on pball   DS 5'              Exercise Diary            Bike  L1 7' L3 - 10' w/ MHP to neck          Seated neck iso - rotation, side bend  5"x10 each 5"x10 each          Supine chin tucks  NV hold          C/s retract with band   YTB 5" 2x10          Corner pec stretch   NV          Tband rows   GTB 5"2x10          Tband ext w/ ER             Prone scap retract             Prone ext                          TrA iso  5"x20 Seated 5"x20          TrA with clamshells  3" 1x15 each           glute set with bridges  NV hold          Standing hip abd with band  NV NV                                                                                                                                                             Modalities    2/20          MHP   x10'                                    Visual inspection and sensation of skin checked before, during, and after application of moist heat/cold pack performed  Skin appears normal and sensation is intact  Pt instructed to inform therapist if the heat/cold becomes uncomfortable and to notify the rehab staff immediately  Skin inspection following moist heat/cold reveals skin is normal and intact with mild redness, but no evidence of burning/skin irritation  6-8 layers of toweling used for hot pack application/pillow case used for cold pack application

## 2020-02-24 ENCOUNTER — OFFICE VISIT (OUTPATIENT)
Dept: PHYSICAL THERAPY | Facility: REHABILITATION | Age: 30
End: 2020-02-24
Payer: COMMERCIAL

## 2020-02-24 DIAGNOSIS — M54.2 NECK PAIN: Primary | ICD-10-CM

## 2020-02-24 DIAGNOSIS — Z98.890 STATUS POST LUMBAR LAMINECTOMY: ICD-10-CM

## 2020-02-24 PROCEDURE — 97140 MANUAL THERAPY 1/> REGIONS: CPT | Performed by: PHYSICAL THERAPIST

## 2020-02-24 PROCEDURE — 97110 THERAPEUTIC EXERCISES: CPT | Performed by: PHYSICAL THERAPIST

## 2020-02-24 PROCEDURE — 97112 NEUROMUSCULAR REEDUCATION: CPT | Performed by: PHYSICAL THERAPIST

## 2020-02-24 NOTE — PROGRESS NOTES
Daily Note     Today's date: 2020  Patient name: Nataly Butler  : 1990  MRN: [id number]  Referring provider: Digan Larson PA-C  Dx:   Encounter Diagnosis     ICD-10-CM    1  Neck pain M54 2    2  Status post lumbar laminectomy Z98 890        Start Time: 0800  Stop Time: 0845  Total time in clinic (min): 45 minutes    Subjective: Pt states therapy seemed to help last visit but she was in a lot of pain Saturday with more L sided neck pain and R thigh pain  States she is having a lot of pain in her R thigh today  Objective: See treatment diary below      Assessment: Less palpable tightness felt in the c/s paraspinals and upper traps today  Pt was very challenged by standing hip abd with band  Cues given during tband shoulder ext to decrease upper trap compensation and for correct cervical spine posture  Pt was also very challenged by prone scap retractions and reported more difficulty/weakness in the LUE  Overall she reported less pain post tx  Pt will benefit from continued skilled outpatient PT to improve strength, to address therapy goals, to reduce pain, and improve function  Plan: Continue per plan of care  Progress treatment as tolerated         Precautions: L5-S1 laminectomy 2019, GERD    FOTO   LBP   = 39/51    Neck   = 39/55      Manual           Gentle SOR and STM to cervical paraspinals  DS 5' Seated STM upper traps + c/s paraspinals - DS 8' Seated STM upper traps + c/s paraspinals - DS 8'         Assess ULTT NV perfomred           Assess  strength NV perfomred           Assess vitals NV performed           Administer HEP NV DS           Scar desensitization, seated with hands supported on pball   DS 5' DS 5'             Exercise Diary           Bike  L1 7' L3 - 10' w/ MHP to neck L3 - 8' w/ MHP to neck         Seated neck iso - rotation, side bend  5"x10 each 5"x10 each HEP         Supine chin tucks  NV hold          C/s retract with band   YTB 5" 2x10 YTB 5" 2x10         Corner pec stretch   NV          Tband rows   GTB 5"2x10 GTB 5"2x10         Tband ext    GTB 5"2x10         Prone scap retract    5" 2x10         Prone ext                          TrA iso  5"x20 Seated 5"x20 HEP         TrA with clamshells  3" 1x15 each           glute set with bridges  NV hold          Standing hip abd with band  NV NV YTB 2x10         Pball seated march with TrA    NV         Pball TrA with paloff press    GTB x15 each                                                                                                                                  Modalities    2/20 2/24         MHP   x10' 8'                                   Visual inspection and sensation of skin checked before, during, and after application of moist heat/cold pack performed  Skin appears normal and sensation is intact  Pt instructed to inform therapist if the heat/cold becomes uncomfortable and to notify the rehab staff immediately  Skin inspection following moist heat/cold reveals skin is normal and intact with mild redness, but no evidence of burning/skin irritation  6-8 layers of toweling used for hot pack application/pillow case used for cold pack application

## 2020-02-27 ENCOUNTER — OFFICE VISIT (OUTPATIENT)
Dept: PHYSICAL THERAPY | Facility: REHABILITATION | Age: 30
End: 2020-02-27
Payer: COMMERCIAL

## 2020-02-27 DIAGNOSIS — Z98.890 STATUS POST LUMBAR LAMINECTOMY: ICD-10-CM

## 2020-02-27 DIAGNOSIS — M54.2 NECK PAIN: Primary | ICD-10-CM

## 2020-02-27 PROCEDURE — 97110 THERAPEUTIC EXERCISES: CPT | Performed by: PHYSICAL THERAPIST

## 2020-02-27 PROCEDURE — 97112 NEUROMUSCULAR REEDUCATION: CPT | Performed by: PHYSICAL THERAPIST

## 2020-02-27 PROCEDURE — 97140 MANUAL THERAPY 1/> REGIONS: CPT | Performed by: PHYSICAL THERAPIST

## 2020-02-27 NOTE — PROGRESS NOTES
Daily Note     Today's date: 2020  Patient name: Niya Almonte  : 1990  MRN: [id number]  Referring provider: Miriam Cotto PA-C  Dx:   Encounter Diagnosis     ICD-10-CM    1  Neck pain M54 2    2  Status post lumbar laminectomy Z98 890        Start Time: 845  Stop Time: 1754  Total time in clinic (min): 53 minutes    Subjective: Pt states she was very sore after last visit and had more pain  States she also had more numbness in the UE last night after doing exercise (similar motion to a nerve glide) but the numbness subsided again  States she was having L shoulder posterior pain yesterday and down into the pec region as well  Objective: See treatment diary below      Assessment:  Trilaed cupping to the upper traps to address soft tissue restrictions; pt responded well to this  Held on adding any new exercises due to pt report of soreness and more pain after last visit  Pt noted having pains in the the L side of the chest/pec regio yesterday  She recently had a follow up with cardio and is being sent for an Echo due to abnormal ECG  Pt was observed to have swelling over the L pec region today  Vitals assessed; HR and O2 WNL, BP slightly elevated at 140/100 and maintained at this pressure with walking  Pt was advised if to go to the ED if she starts getting sharp chest pains again, shortness of breath/trouble breathing  Pt verbalized understanding  Plan: Continue per plan of care  Progress treatment as tolerated  Monitor cardiac sx's       Precautions: L5-S1 laminectomy 2019, GERD    FOTO   LBP   = 39/51    Neck   = 39/55      Manual          Gentle SOR and STM to cervical paraspinals  DS 5' Seated STM upper traps + c/s paraspinals - DS 8' Seated STM upper traps + c/s paraspinals - DS 8' Rock pod cupping - 2 on each upper trap - 5'        Assess ULTT NV perfomred           Assess  strength NV perfomred           Assess vitals NV performed   /100 HR 86   O2 = 98        Administer HEP NV DS           Scar desensitization, seated with hands supported on pball   DS 5' DS 5' NV            Exercise Diary  2/13 2/17 2/20 2/24 2/27        Bike  L1 7' L3 - 10' w/ MHP to neck L3 - 8' w/ MHP to neck L3 - 8' w/ MHP to neck        Seated neck iso - rotation, side bend  5"x10 each 5"x10 each HEP         Supine chin tucks  NV hold          C/s retract with band   YTB 5" 2x10 YTB 5" 2x10 YTB 5" 2x10        Corner pec stretch   NV          Tband rows   GTB 5"2x10 GTB 5"2x10 GTB 5"2x10        Tband ext    GTB 5"2x10 GTB 5"2x10        Prone scap retract    5" 2x10 5" 2x10        Prone ext                          TrA iso  5"x20 Seated 5"x20 HEP         TrA with clamshells  3" 1x15 each           glute set with bridges  NV hold          Standing hip abd with band  NV NV YTB 2x10 YTB 2x10        Pball seated march with TrA    NV NV        Pball TrA with paloff press    GTB x15 each GTB x20 each                                                                                                                                 Modalities    2/20 2/24 2/28        MHP   x10' 8' 8'                                  Visual inspection and sensation of skin checked before, during, and after application of moist heat/cold pack performed  Skin appears normal and sensation is intact  Pt instructed to inform therapist if the heat/cold becomes uncomfortable and to notify the rehab staff immediately  Skin inspection following moist heat/cold reveals skin is normal and intact with mild redness, but no evidence of burning/skin irritation  6-8 layers of toweling used for hot pack application/pillow case used for cold pack application

## 2020-03-02 ENCOUNTER — OFFICE VISIT (OUTPATIENT)
Dept: PHYSICAL THERAPY | Facility: REHABILITATION | Age: 30
End: 2020-03-02
Payer: COMMERCIAL

## 2020-03-02 DIAGNOSIS — M54.2 NECK PAIN: Primary | ICD-10-CM

## 2020-03-02 DIAGNOSIS — Z98.890 STATUS POST LUMBAR LAMINECTOMY: ICD-10-CM

## 2020-03-02 PROCEDURE — 97112 NEUROMUSCULAR REEDUCATION: CPT | Performed by: PHYSICAL THERAPIST

## 2020-03-02 PROCEDURE — 97110 THERAPEUTIC EXERCISES: CPT | Performed by: PHYSICAL THERAPIST

## 2020-03-02 NOTE — PROGRESS NOTES
Daily Note     Today's date: 3/2/2020  Patient name: Ingrid Kennedy  : 1990  MRN: [id number]  Referring provider: Qiana Yao PA-C  Dx:   Encounter Diagnosis     ICD-10-CM    1  Neck pain M54 2    2  Status post lumbar laminectomy Z98 890        Start Time: 902  Stop Time: 189  Total time in clinic (min): 45 minutes    Subjective: Pt states the cupping seemed to be helpful but her low back was very painful this weekend and the L leg was very numb  States the numbness in the LLE comes and goes but has been constant since this weekend  Objective: See treatment diary below  Per cardiologist, pt ok to continue PT for the neck  Assessment:  Focused on the low back tx today  Pt demonstrates difficulty disassociating the low back from the hips  Cues was challenged by q-ped TrA iso with UE lifts and prone hip ext  Verbal and tactile cues given to prevent lumbar compensation during these exercises  Max cues given during paloff press and pball marching to maintain TrA  Advised pt to continue with neck isometrics at home  Plan: Continue per plan of care  Progress treatment as tolerated  Monitor cardiac sx's       Precautions: L5-S1 laminectomy 2019, GERD    FOTO   LBP   = 39/51    Neck   = 39/55      Manual  2/13 2/17 2/20 2/24 2/28 3/2       Gentle SOR and STM to cervical paraspinals  DS 5' Seated STM upper traps + c/s paraspinals - DS 8' Seated STM upper traps + c/s paraspinals - DS 8' Rock pod cupping - 2 on each upper trap - 5' Rock pod cupping - 2 on each upper trap - 5'       Assess ULTT NV perfomred           Assess  strength NV perfomred           Assess vitals NV performed   /100 HR 86   O2 = 98        Administer HEP NV DS           Scar desensitization, seated with hands supported on pball   DS 5' DS 5' NV            Exercise Diary  2/13 2/17 2/20 2/24 2/27 3/2       Bike  L1 7' L3 - 10' w/ MHP to neck L3 - 8' w/ MHP to neck L3 - 8' w/ MHP to neck L3 - 8' w/ MHP to neck       Seated neck iso - rotation, side bend  5"x10 each 5"x10 each HEP         Supine chin tucks  NV hold          C/s retract with band   YTB 5" 2x10 YTB 5" 2x10 YTB 5" 2x10        Corner pec stretch   NV          Tband rows   GTB 5"2x10 GTB 5"2x10 GTB 5"2x10        Tband ext    GTB 5"2x10 GTB 5"2x10        Prone scap retract    5" 2x10 5" 2x10        Prone ext                          TrA iso  5"x20 Seated 5"x20 HEP  Seated 5"x20       TrA with clamshells  3" 1x15 each           glute set with bridges  NV hold   5" x20       Standing hip abd with band  NV NV YTB 2x10 YTB 2x10        Pball seated march with TrA    NV NV x15 each       Pball TrA with paloff press    GTB x15 each GTB x20 each standing GTB x20 each       LE nerve glides      x20        Qped TrA with UE lifts      x10        Prone hip ext at edge of mat      x15 each       Sit to stand with dowel w/ TrA      x15                                                                            Modalities    2/20 2/24 2/28 3/2       MHP   x10' 8' 8' 8'                                 Visual inspection and sensation of skin checked before, during, and after application of moist heat/cold pack performed  Skin appears normal and sensation is intact  Pt instructed to inform therapist if the heat/cold becomes uncomfortable and to notify the rehab staff immediately  Skin inspection following moist heat/cold reveals skin is normal and intact with mild redness, but no evidence of burning/skin irritation  6-8 layers of toweling used for hot pack application/pillow case used for cold pack application

## 2020-03-05 ENCOUNTER — APPOINTMENT (OUTPATIENT)
Dept: PHYSICAL THERAPY | Facility: REHABILITATION | Age: 30
End: 2020-03-05
Payer: COMMERCIAL

## 2020-03-09 ENCOUNTER — OFFICE VISIT (OUTPATIENT)
Dept: PHYSICAL THERAPY | Facility: REHABILITATION | Age: 30
End: 2020-03-09
Payer: COMMERCIAL

## 2020-03-09 DIAGNOSIS — M54.2 NECK PAIN: Primary | ICD-10-CM

## 2020-03-09 DIAGNOSIS — Z98.890 STATUS POST LUMBAR LAMINECTOMY: ICD-10-CM

## 2020-03-09 PROCEDURE — 97140 MANUAL THERAPY 1/> REGIONS: CPT | Performed by: PHYSICAL THERAPIST

## 2020-03-09 PROCEDURE — 97110 THERAPEUTIC EXERCISES: CPT | Performed by: PHYSICAL THERAPIST

## 2020-03-09 PROCEDURE — 97112 NEUROMUSCULAR REEDUCATION: CPT | Performed by: PHYSICAL THERAPIST

## 2020-03-09 NOTE — PROGRESS NOTES
Daily Note     Today's date: 3/9/2020  Patient name: Dagmar Mae  : 1990  MRN: [id number]  Referring provider: Sandra De PA-C  Dx:   Encounter Diagnosis     ICD-10-CM    1  Neck pain M54 2    2  Status post lumbar laminectomy Z98 890        Start Time: 805  Stop Time: 305  Total time in clinic (min): 50 minutes    Subjective: Pt states the low back is more painful today due to painting over the weekend  Objective: See treatment diary below  Per cardiologist, pt ok to continue PT for the neck  Assessment:  Pt demonstrates difficulty preventing lumbar compensation during prone hip ext  With cues this improves, but she will benefit from continued practice  Cues also given during clamshells to prevent lumbar rotation  Updated HEP to include prone hip ext at edge of mat, bridges, and q-ped UE lifts  Advised pt to focus on avoiding lumbar extension during all of these exercises  Plan: Continue per plan of care  Progress treatment as tolerated  Monitor cardiac sx's       Precautions: L5-S1 laminectomy 2019, GERD    FOTO   LBP   = 39/51  3/9 = 52/51    Neck   = 39/55  3/9 = 54/55    Manual  2/13 2/17 2/20 2/24 2/28 3/2 3/9      Gentle SOR and STM to cervical paraspinals  DS 5' Seated STM upper traps + c/s paraspinals - DS 8' Seated STM upper traps + c/s paraspinals - DS 8' Rock pod cupping - 2 on each upper trap - 5' Rock pod cupping - 2 on each upper trap - 5' Rock pod cupping - 2 on each upper trap - 8'      Assess ULTT NV perfomred           Assess  strength NV perfomred           Assess vitals NV performed   /100 HR 86   O2 = 98        Administer HEP NV DS           Scar desensitization, seated with hands supported on pball   DS 5' DS 5' NV            Exercise Diary  2/13 2/17 2/20 2/24 2/27 3/2 3/9      Bike  L1 7' L3 - 10' w/ MHP to neck L3 - 8' w/ MHP to neck L3 - 8' w/ MHP to neck L3 - 8' w/ MHP to neck L3 - 8' w/ MHP to neck      Seated neck iso - rotation, side bend  5"x10 each 5"x10 each HEP         Supine chin tucks  NV hold          C/s retract with band   YTB 5" 2x10 YTB 5" 2x10 YTB 5" 2x10  OTB 5" x20      Corner pec stretch   NV          Tband rows   GTB 5"2x10 GTB 5"2x10 GTB 5"2x10        Tband ext    GTB 5"2x10 GTB 5"2x10        Prone scap retract    5" 2x10 5" 2x10        Prone ext                          TrA iso  5"x20 Seated 5"x20 HEP  Seated 5"x20 HEP      TrA with clamshells  3" 1x15 each     3" 1x15 each      glute set with bridges  NV hold   5" x20 5" x20      Standing hip abd with band  NV NV YTB 2x10 YTB 2x10  NV      Pball seated march with TrA    NV NV x15 each x20 each      Pball TrA with paloff press    GTB x15 each GTB x20 each standing GTB x20 each standing GTB x20 each      LE nerve glides      x20  x20 each      Qped TrA with UE lifts      x10  x15 each      Prone hip ext at edge of mat      x15 each x15 each      Sit to stand with dowel w/ TrA      x15 NV                                                                           Modalities    2/20 2/24 2/28 3/2 3/9      MHP   x10' 8' 8' 8' 8'                                Visual inspection and sensation of skin checked before, during, and after application of moist heat/cold pack performed  Skin appears normal and sensation is intact  Pt instructed to inform therapist if the heat/cold becomes uncomfortable and to notify the rehab staff immediately  Skin inspection following moist heat/cold reveals skin is normal and intact with mild redness, but no evidence of burning/skin irritation  6-8 layers of toweling used for hot pack application/pillow case used for cold pack application

## 2020-03-12 ENCOUNTER — OFFICE VISIT (OUTPATIENT)
Dept: PHYSICAL THERAPY | Facility: REHABILITATION | Age: 30
End: 2020-03-12
Payer: COMMERCIAL

## 2020-03-12 DIAGNOSIS — M54.2 NECK PAIN: Primary | ICD-10-CM

## 2020-03-12 DIAGNOSIS — Z98.890 STATUS POST LUMBAR LAMINECTOMY: ICD-10-CM

## 2020-03-12 PROCEDURE — 97140 MANUAL THERAPY 1/> REGIONS: CPT | Performed by: PHYSICAL THERAPIST

## 2020-03-12 PROCEDURE — 97112 NEUROMUSCULAR REEDUCATION: CPT | Performed by: PHYSICAL THERAPIST

## 2020-03-12 PROCEDURE — 97110 THERAPEUTIC EXERCISES: CPT | Performed by: PHYSICAL THERAPIST

## 2020-03-12 NOTE — PROGRESS NOTES
Daily Note     Today's date: 3/12/2020  Patient name: Nanda Herrera  : 1990  MRN: [id number]  Referring provider: Cait Bella PA-C  Dx:   Encounter Diagnosis     ICD-10-CM    1  Neck pain M54 2    2  Status post lumbar laminectomy Z98 890        Start Time: 845  Stop Time: 929  Total time in clinic (min): 44 minutes    Subjective:  Pt states she felt good after last visit, but she started having more pain again yesterday  Objective: See treatment diary below  Per cardiologist, pt ok to continue PT for the neck  Assessment: Pt continues to demonstrate significant difficulty performing prone hip ext without lumbar ext  Added prone glute set with heel squeeze and pt was able to engage the glutes without lumbar activation  Pt demonstrated much better form during clamshells today with minimal lumbar rotation  Instructed pt to practice glute sets at home  Pt will benefit from continued skilled outpatient PT to improve strength, to address therapy goals, to reduce pain, and improve function  Plan: Continue per plan of care  Progress treatment as tolerated  Monitor cardiac sx's       Precautions: L5-S1 laminectomy 2019, GERD    FOTO   LBP   = 39/51  3 = 52/51    Neck   = 39/55  3/9 = 54/55    Manual  2/13 2/17 2/20 2/24 2/28 3/2 3/9 3/12     Gentle SOR and STM to cervical paraspinals  DS 5' Seated STM upper traps + c/s paraspinals - DS 8' Seated STM upper traps + c/s paraspinals - DS 8' Rock pod cupping - 2 on each upper trap - 5' Rock pod cupping - 2 on each upper trap - 5' Rock pod cupping - 2 on each upper trap - 8' Rock pod cupping - 2 on each upper trap - 8'     Assess ULTT NV perfomred           Assess  strength NV perfomred           Assess vitals NV performed   /100 HR 86   O2 = 98        Administer HEP NV DS           Scar desensitization, seated with hands supported on pball   DS 5' DS 5' NV            Exercise Diary  2/13 2/17 2/20 2/24 2/27 3/2 3/9 3/12 Bike  L1 7' L3 - 10' w/ MHP to neck L3 - 8' w/ MHP to neck L3 - 8' w/ MHP to neck L3 - 8' w/ MHP to neck L3 - 8' w/ MHP to neck L3 - 8' w/ MHP to neck     Seated neck iso - rotation, side bend  5"x10 each 5"x10 each HEP         Supine chin tucks  NV hold          C/s retract with band   YTB 5" 2x10 YTB 5" 2x10 YTB 5" 2x10  OTB 5" x20 OTB 5"x30     Corner pec stretch   NV          Tband rows   GTB 5"2x10 GTB 5"2x10 GTB 5"2x10        Tband ext    GTB 5"2x10 GTB 5"2x10        Prone scap retract    5" 2x10 5" 2x10        Prone ext                          TrA iso  5"x20 Seated 5"x20 HEP  Seated 5"x20 HEP      TrA with clamshells  3" 1x15 each     3" 1x15 each OTB 3"x15 each     glute set with bridges  NV hold   5" x20 5" x20 OTB 3"x15 each     Standing hip abd with band  NV NV YTB 2x10 YTB 2x10  NV NV     Pball seated march with TrA    NV NV x15 each x20 each Trialed, pain today     Pball TrA with paloff press    GTB x15 each GTB x20 each standing GTB x20 each standing GTB x20 each NV     LE nerve glides      x20  x20 each x30     Qped TrA with UE lifts      x10  x15 each x15 each     Prone hip ext at edge of mat      x15 each x15 each x15 each     Sit to stand with dowel w/ TrA      x15 NV      Prone glute set with heel squeeze        10"x10                                                             Modalities    2/20 2/24 2/28 3/2 3/9      MHP   x10' 8' 8' 8' 8'                                Visual inspection and sensation of skin checked before, during, and after application of moist heat/cold pack performed  Skin appears normal and sensation is intact  Pt instructed to inform therapist if the heat/cold becomes uncomfortable and to notify the rehab staff immediately  Skin inspection following moist heat/cold reveals skin is normal and intact with mild redness, but no evidence of burning/skin irritation  6-8 layers of toweling used for hot pack application/pillow case used for cold pack application

## 2020-03-16 ENCOUNTER — APPOINTMENT (OUTPATIENT)
Dept: PHYSICAL THERAPY | Facility: REHABILITATION | Age: 30
End: 2020-03-16
Payer: COMMERCIAL

## 2020-03-19 ENCOUNTER — APPOINTMENT (OUTPATIENT)
Dept: PHYSICAL THERAPY | Facility: REHABILITATION | Age: 30
End: 2020-03-19
Payer: COMMERCIAL

## 2020-03-23 ENCOUNTER — OFFICE VISIT (OUTPATIENT)
Dept: PHYSICAL THERAPY | Facility: REHABILITATION | Age: 30
End: 2020-03-23
Payer: COMMERCIAL

## 2020-03-23 DIAGNOSIS — Z98.890 STATUS POST LUMBAR LAMINECTOMY: ICD-10-CM

## 2020-03-23 DIAGNOSIS — M54.2 NECK PAIN: Primary | ICD-10-CM

## 2020-03-23 PROCEDURE — 97110 THERAPEUTIC EXERCISES: CPT | Performed by: PHYSICAL THERAPIST

## 2020-03-23 PROCEDURE — 97112 NEUROMUSCULAR REEDUCATION: CPT | Performed by: PHYSICAL THERAPIST

## 2020-03-23 PROCEDURE — 97140 MANUAL THERAPY 1/> REGIONS: CPT | Performed by: PHYSICAL THERAPIST

## 2020-03-23 NOTE — PROGRESS NOTES
Daily Note     Today's date: 3/23/2020  Patient name: Nataly Butler  : 1990  MRN: [id number]  Referring provider: Digna Larson PA-C  Dx:   Encounter Diagnosis     ICD-10-CM    1  Neck pain M54 2    2  Status post lumbar laminectomy Z98 890        Start Time: 1225  Stop Time: 1302  Total time in clinic (min): 37 minutes    Subjective:  Pt states she is having a lot of neck pain and has a headache today but overall feels like PT is helping  Objective: See treatment diary below  Per cardiologist, pt ok to continue PT for the neck  Tx duration shortened due to pt arriving 10 min late  Assessment:Fair response to tx  Pt continues to demonstrate the most difficulty performing hip extension without lumbar extension  Pt will benefit from continued skilled outpatient PT to improve strength, to address therapy goals, to reduce pain, and improve function  Plan: Continue per plan of care  Progress treatment as tolerated  Monitor cardiac sx's       Precautions: L5-S1 laminectomy 2019, GERD    FOTO   LBP   = 39/51  3 = 52/51    Neck   = 39/55  3/9 = 54/55    Manual  2/13 2/17 2/20 2/24 2/28 3/2 3/9 3/12 3/23    Gentle SOR and STM to cervical paraspinals  DS 5' Seated STM upper traps + c/s paraspinals - DS 8' Seated STM upper traps + c/s paraspinals - DS 8' Rock pod cupping - 2 on each upper trap - 5' Rock pod cupping - 2 on each upper trap - 5' Rock pod cupping - 2 on each upper trap - 8' Rock pod cupping - 2 on each upper trap - 8' Rock pod cupping - 2 on each upper trap - 8'    Assess ULTT NV perfomred           Assess  strength NV perfomred           Assess vitals NV performed   /100 HR 86   O2 = 98        Administer HEP NV DS           Scar desensitization, seated with hands supported on pball   DS 5' DS 5' NV            Exercise Diary  2/13 2/17 2/20 2/24 2/27 3/2 3/9 3/12 3/23    Bike  L1 7' L3 - 10' w/ MHP to neck L3 - 8' w/ MHP to neck L3 - 8' w/ MHP to neck L3 - 8' w/ MHP to neck L3 - 8' w/ MHP to neck L3 - 8' w/ MHP to neck L3 - 8' w/ MHP to neck    Seated neck iso - rotation, side bend  5"x10 each 5"x10 each HEP         Supine chin tucks  NV hold          C/s retract with band   YTB 5" 2x10 YTB 5" 2x10 YTB 5" 2x10  OTB 5" x20 OTB 5"x30     Corner pec stretch   NV          Tband rows   GTB 5"2x10 GTB 5"2x10 GTB 5"2x10        Tband ext    GTB 5"2x10 GTB 5"2x10        Prone scap retract    5" 2x10 5" 2x10        Prone ext                          TrA iso  5"x20 Seated 5"x20 HEP  Seated 5"x20 HEP      TrA with clamshells  3" 1x15 each     3" 1x15 each OTB 3"x15 each OTB 3"x15 each    glute set with bridges  NV hold   5" x20 5" x20 OTB 3"x15 each 5"x20 each    Standing hip abd with band  NV NV YTB 2x10 YTB 2x10  NV NV     Pball seated march with TrA    NV NV x15 each x20 each Trialed, pain today     Pball TrA with paloff press    GTB x15 each GTB x20 each standing GTB x20 each standing GTB x20 each NV     LE nerve glides      x20  x20 each x30 x30 each    Qped TrA with UE lifts      x10  x15 each x15 each x15 each    Prone hip ext at edge of mat      x15 each x15 each x15 each x15 each    Sit to stand with dowel w/ TrA      x15 NV  x20    Prone glute set with heel squeeze        10"x10 5"x20    Hand heel rock w/ neutral spine                                                        Modalities    2/20 2/24 2/28 3/2 3/9  3/23    MHP   x10' 8' 8' 8' 8'  8'                              Visual inspection and sensation of skin checked before, during, and after application of moist heat/cold pack performed  Skin appears normal and sensation is intact  Pt instructed to inform therapist if the heat/cold becomes uncomfortable and to notify the rehab staff immediately  Skin inspection following moist heat/cold reveals skin is normal and intact with mild redness, but no evidence of burning/skin irritation    6-8 layers of toweling used for hot pack application/pillow case used for cold pack application

## 2020-03-26 ENCOUNTER — APPOINTMENT (OUTPATIENT)
Dept: PHYSICAL THERAPY | Facility: REHABILITATION | Age: 30
End: 2020-03-26
Payer: COMMERCIAL

## 2020-04-17 ENCOUNTER — TELEPHONE (OUTPATIENT)
Dept: CARDIOLOGY CLINIC | Facility: CLINIC | Age: 30
End: 2020-04-17

## 2020-04-21 NOTE — PROGRESS NOTES
PT DISCHARGE SUMMARY      Pt has not returned for follow up in 30 days and is therefore discharged for therapy at this time

## 2020-05-11 ENCOUNTER — EVALUATION (OUTPATIENT)
Dept: PHYSICAL THERAPY | Facility: REHABILITATION | Age: 30
End: 2020-05-11
Payer: COMMERCIAL

## 2020-05-11 ENCOUNTER — TRANSCRIBE ORDERS (OUTPATIENT)
Dept: PHYSICAL THERAPY | Facility: REHABILITATION | Age: 30
End: 2020-05-11

## 2020-05-11 DIAGNOSIS — M54.2 NECK PAIN: ICD-10-CM

## 2020-05-11 DIAGNOSIS — Z98.890 STATUS POST LUMBAR LAMINECTOMY: Primary | ICD-10-CM

## 2020-05-11 PROCEDURE — 97112 NEUROMUSCULAR REEDUCATION: CPT | Performed by: PHYSICAL THERAPIST

## 2020-05-11 PROCEDURE — 97164 PT RE-EVAL EST PLAN CARE: CPT | Performed by: PHYSICAL THERAPIST

## 2020-05-21 ENCOUNTER — APPOINTMENT (OUTPATIENT)
Dept: PHYSICAL THERAPY | Facility: REHABILITATION | Age: 30
End: 2020-05-21
Payer: COMMERCIAL

## 2020-05-26 ENCOUNTER — HOSPITAL ENCOUNTER (OUTPATIENT)
Dept: NON INVASIVE DIAGNOSTICS | Facility: CLINIC | Age: 30
Discharge: HOME | End: 2020-05-26
Payer: COMMERCIAL

## 2020-05-26 ENCOUNTER — APPOINTMENT (OUTPATIENT)
Dept: PHYSICAL THERAPY | Facility: REHABILITATION | Age: 30
End: 2020-05-26
Payer: COMMERCIAL

## 2020-05-26 DIAGNOSIS — R94.31 ABNORMAL EKG: ICD-10-CM

## 2020-05-26 DIAGNOSIS — R07.89 OTHER CHEST PAIN: ICD-10-CM

## 2020-05-26 LAB
CHEST PAIN STATEMENT: NORMAL
MAX DIASTOLIC BP: 82 MMHG
MAX HEART RATE: 187 BPM
MAX PREDICTED HEART RATE: 190 BPM
MAX. SYSTOLIC BP: 160 MMHG
PROTOCOL NAME: NORMAL
REASON FOR TERMINATION: NORMAL
TARGET HR FORMULA: NORMAL
TEST INDICATION: NORMAL
TIME IN EXERCISE PHASE: NORMAL

## 2020-05-26 PROCEDURE — 93350 STRESS TTE ONLY: CPT

## 2020-05-26 PROCEDURE — 93351 STRESS TTE COMPLETE: CPT | Performed by: INTERNAL MEDICINE

## 2020-05-28 ENCOUNTER — APPOINTMENT (OUTPATIENT)
Dept: PHYSICAL THERAPY | Facility: REHABILITATION | Age: 30
End: 2020-05-28
Payer: COMMERCIAL

## 2020-06-15 ENCOUNTER — NURSE TRIAGE (OUTPATIENT)
Dept: OTHER | Facility: OTHER | Age: 30
End: 2020-06-15

## 2020-06-17 ENCOUNTER — HOSPITAL ENCOUNTER (OUTPATIENT)
Dept: RADIOLOGY | Facility: HOSPITAL | Age: 30
Discharge: HOME | End: 2020-06-17
Payer: COMMERCIAL

## 2020-06-17 ENCOUNTER — TRANSCRIBE ORDERS (OUTPATIENT)
Dept: RADIOLOGY | Facility: HOSPITAL | Age: 30
End: 2020-06-17

## 2020-06-17 DIAGNOSIS — M54.2 PAIN, NECK: Primary | ICD-10-CM

## 2020-06-17 DIAGNOSIS — M79.642 LEFT HAND PAIN: ICD-10-CM

## 2020-06-17 DIAGNOSIS — M54.50 LOW BACK PAIN, UNSPECIFIED BACK PAIN LATERALITY, UNSPECIFIED CHRONICITY, UNSPECIFIED WHETHER SCIATICA PRESENT: ICD-10-CM

## 2020-06-17 DIAGNOSIS — M79.641 RIGHT HAND PAIN: ICD-10-CM

## 2020-06-17 DIAGNOSIS — M54.2 PAIN, NECK: ICD-10-CM

## 2020-06-17 PROCEDURE — 72050 X-RAY EXAM NECK SPINE 4/5VWS: CPT

## 2020-06-17 PROCEDURE — 72202 X-RAY EXAM SI JOINTS 3/> VWS: CPT

## 2020-06-17 PROCEDURE — 73130 X-RAY EXAM OF HAND: CPT

## 2020-07-01 ENCOUNTER — TELEPHONE (OUTPATIENT)
Dept: NEUROSURGERY | Facility: CLINIC | Age: 30
End: 2020-07-01

## 2021-03-15 ENCOUNTER — TRANSCRIBE ORDERS (OUTPATIENT)
Dept: ADMINISTRATIVE | Facility: HOSPITAL | Age: 31
End: 2021-03-15

## 2021-03-15 ENCOUNTER — HOSPITAL ENCOUNTER (OUTPATIENT)
Dept: RADIOLOGY | Facility: HOSPITAL | Age: 31
Discharge: HOME | End: 2021-03-15
Payer: COMMERCIAL

## 2021-03-15 DIAGNOSIS — G89.29 CHRONIC IDIOPATHIC ANAL PAIN: ICD-10-CM

## 2021-03-15 DIAGNOSIS — K62.89 CHRONIC IDIOPATHIC ANAL PAIN: ICD-10-CM

## 2021-03-15 DIAGNOSIS — M25.562 LEFT KNEE PAIN, UNSPECIFIED CHRONICITY: Primary | ICD-10-CM

## 2021-03-15 DIAGNOSIS — M25.562 LEFT KNEE PAIN, UNSPECIFIED CHRONICITY: ICD-10-CM

## 2021-03-15 PROCEDURE — 73562 X-RAY EXAM OF KNEE 3: CPT

## 2021-07-02 ENCOUNTER — APPOINTMENT (OUTPATIENT)
Dept: RADIOLOGY | Age: 31
End: 2021-07-02
Payer: COMMERCIAL

## 2021-07-02 ENCOUNTER — OFFICE VISIT (OUTPATIENT)
Dept: URGENT CARE | Age: 31
End: 2021-07-02
Payer: COMMERCIAL

## 2021-07-02 VITALS
DIASTOLIC BLOOD PRESSURE: 86 MMHG | TEMPERATURE: 98.1 F | OXYGEN SATURATION: 98 % | SYSTOLIC BLOOD PRESSURE: 125 MMHG | HEART RATE: 81 BPM | RESPIRATION RATE: 16 BRPM

## 2021-07-02 DIAGNOSIS — M54.42 ACUTE MIDLINE LOW BACK PAIN WITH LEFT-SIDED SCIATICA: Primary | ICD-10-CM

## 2021-07-02 DIAGNOSIS — M54.42 ACUTE MIDLINE LOW BACK PAIN WITH LEFT-SIDED SCIATICA: ICD-10-CM

## 2021-07-02 PROCEDURE — 72110 X-RAY EXAM L-2 SPINE 4/>VWS: CPT

## 2021-07-02 PROCEDURE — 99203 OFFICE O/P NEW LOW 30 MIN: CPT | Performed by: NURSE PRACTITIONER

## 2021-07-02 RX ORDER — PREDNISONE 20 MG/1
TABLET ORAL
Qty: 10 TABLET | Refills: 0 | Status: SHIPPED | OUTPATIENT
Start: 2021-07-02

## 2021-07-02 RX ORDER — DULOXETIN HYDROCHLORIDE 30 MG/1
30 CAPSULE, DELAYED RELEASE ORAL DAILY
COMMUNITY

## 2021-07-02 NOTE — PATIENT INSTRUCTIONS
Sciatica   WHAT YOU NEED TO KNOW:   Sciatica is a condition that causes pain along your sciatic nerve  The sciatic nerve runs from your spine through both sides of your buttocks  It then runs down the back of your thigh, into your lower leg and foot  Your sciatic nerve may be compressed, inflamed, irritated, or stretched  DISCHARGE INSTRUCTIONS:   Medicines:   · NSAIDs:  These medicines decrease swelling and pain  NSAIDs are available without a doctor's order  Ask your healthcare provider which medicine is right for you  Ask how much to take and when to take it  Take as directed  NSAIDs can cause stomach bleeding or kidney problems if not taken correctly  · Acetaminophen: This medicine decreases pain  Acetaminophen is available without a doctor's order  Ask how much to take and when to take it  Follow directions  Acetaminophen can cause liver damage if not taken correctly  · Muscle relaxers  help decrease pain and muscle spasms  · Take your medicine as directed  Contact your healthcare provider if you think your medicine is not helping or if you have side effects  Tell him of her if you are allergic to any medicine  Keep a list of the medicines, vitamins, and herbs you take  Include the amounts, and when and why you take them  Bring the list or the pill bottles to follow-up visits  Carry your medicine list with you in case of an emergency  Follow up with your healthcare provider as directed:  Write down your questions so you remember to ask them during your visits  Manage your symptoms:   · Activity:  Decrease your activity  Do not lift heavy objects or twist your back for at least 6 weeks  Slowly return to your usual activity  · Ice:  Ice helps decrease swelling and pain  Ice may also help prevent tissue damage  Use an ice pack, or put crushed ice in a plastic bag  Cover it with a towel and place it on your low back or leg for 15 to 20 minutes every hour or as directed      · Heat:  Heat helps decrease pain and muscle spasms  Apply heat on the area for 20 to 30 minutes every 2 hours for as many days as directed  · Physical therapy:  You may need to see physical therapist to teach you exercises to help improve movement and strength, and to decrease pain  An occupational therapist teaches you skills to help with your daily activities  · Use assistive devices if directed: You may need to wear back support, such as a back brace  You may need crutches, a cane, or a walker to decrease stress on your lower back and leg muscles  Ask your healthcare provider for more information about assistive devices and how to use them correctly  Self-care:   · Avoid pressure on your back and legs:  Do not  lift heavy objects, or stand or sit for long periods of time  · Lift objects safely:  Keep your back straight and bend your knees when you  an object  Do not bend or twist your back when you lift  · Maintain a healthy weight:  Ask your healthcare provider how much you should weigh  Ask him to help you create a weight loss plan if you are overweight  · Exercise:  Ask your healthcare provider about the best stretching, warmup, and exercise plan for you  Contact your healthcare provider if:   · You have pain in your lower back at night or when resting  · You have pain in your lower back with numbness below the knee  · You have weakness in one leg only  · You have questions or concerns about your condition or care  Return to the emergency department if:   · You have trouble holding back your urine or bowel movements  · You have weakness in both legs  · You have numbness in your groin or buttocks  © Copyright [address] Information is for End User's use only and may not be sold, redistributed or otherwise used for commercial purposes   All illustrations and images included in CareNotes® are the copyrighted property of A D [employer] M [employer] Inc  or Paulina Arrington  The above information is an  only  It is not intended as medical advice for individual conditions or treatments  Talk to your doctor, nurse or pharmacist before following any medical regimen to see if it is safe and effective for you

## 2021-07-02 NOTE — LETTER
July 2, 2021     Patient: Aruna Gruber   YOB: 1990   Date of Visit: 7/2/2021       To Whom it May Concern:    Aruna Gruber was at our clinic today 07/02/2021  He brought his wife who is sick  He can return to work on 07/03/2021  If you have any questions or concerns, please don't hesitate to call           Sincerely,          DAMION Slaughter        CC: No Recipients

## 2021-07-02 NOTE — LETTER
July 2, 2021     Patient: Sanjana Dumont   YOB: 1990   Date of Visit: 7/2/2021       To Whom it May Concern:    Sanjana Dumont was seen in my clinic on 7/2/2021  She may return to work on 07/03/2021  If you have any questions or concerns, please don't hesitate to call           Sincerely,          DAMION Messer        CC: No Recipients

## 2021-07-02 NOTE — PROGRESS NOTES
[facility]        NAME: Kiran Allen is a 31 y o  female  : 1990    MRN: [id number]  DATE: 2021  TIME: 6:26 PM    Assessment and Plan   Acute midline low back pain with left-sided sciatica [M54 42]  1  Acute midline low back pain with left-sided sciatica  predniSONE 20 mg tablet    CANCELED: XR spine lumbar 2 or 3 views injury         Patient Instructions     X-rays with no fracture  Prednisone OTC prn  Continue all meds as directed by PCP and rheumatologist   Follow up with PCP in 3-5 days  Proceed to  ER if symptoms worsen  Chief Complaint     Chief Complaint   Patient presents with    Back Pain     since yesterday but worsening today after lifting a laundry basket; hx of back surgery in 2019         History of Present Illness       HPI   Reports back pain  States she was lifting a basket of cloth yesterday and had a sudden pain in her lower back  Chronic Hx of back pain  Had surgery in 2019  Pain never did go away  Previous MRIs show bulging disc and degenerative spondylosis  Today pain is 8/10, with intermittent radiation down the legs, worse with bending, better with rest  No incontinence  Review of Systems   Review of Systems   Musculoskeletal: Positive for back pain (as described above)  Skin: Negative for color change, rash and wound  Neurological: Positive for numbness (and tingling, going down the legs, more on the left side, intermittent, chronic duration)  Negative for weakness           Current Medications       Current Outpatient Medications:     DICLOFENAC SODIUM ER PO, Take by mouth, Disp: , Rfl:     DULoxetine (CYMBALTA) 30 mg delayed release capsule, Take 30 mg by mouth daily, Disp: , Rfl:     acetaminophen (TYLENOL) 325 mg tablet, Take 3 tablets (975 mg total) by mouth every 8 (eight) hours (Patient taking differently: Take 975 mg by mouth as needed ), Disp: 30 tablet, Rfl: 0    baclofen 10 mg tablet, Take one tablet by mouth every 8 hours for back and leg spasm (Patient not taking: Reported on 2/17/2020), Disp: 21 tablet, Rfl: 0    baclofen 20 mg tablet, TAKE 1 TABLET BY MOUTH TWICE A DAY AS NEEDED FOR PAIN/SPASM, Disp: , Rfl: 1    esomeprazole (NexIUM) 10 MG packet, Take by mouth every morning before breakfast, Disp: , Rfl:     gabapentin (NEURONTIN) 100 mg capsule, Take 1 capsule (100 mg total) by mouth 3 (three) times a day (Patient not taking: Reported on 2/17/2020), Disp: 90 capsule, Rfl: 0    gabapentin (NEURONTIN) 300 mg capsule, TAKE 1 TABLET BY MOUTH IN THE MORNING AND TAKE 2 TABLETS AT BEDTIME, Disp: , Rfl: 0    hyoscyamine (ANASPAZ,LEVSIN) 0 125 MG tablet, TAKE 1 TABLET BY MOUTH 3 TIMES A DAY AS NEEDED FOR LOOSE BOWELS, Disp: , Rfl: 0    ibuprofen (MOTRIN) 600 mg tablet, Take by mouth as needed, Disp: , Rfl:     meloxicam (MOBIC) 15 mg tablet, Take 15 mg by mouth daily, Disp: , Rfl:     omeprazole (PriLOSEC) 20 mg delayed release capsule, Take 1 tablet by mouth daily, Disp: , Rfl:     predniSONE 20 mg tablet, 1 tab TID x 2 days, then BID x 2 days, then daily x 2 days, Disp: 10 tablet, Rfl: 0    Current Allergies     Allergies as of 07/02/2021 - Reviewed 07/02/2021   Allergen Reaction Noted    Septra [sulfamethoxazole-trimethoprim] Rash and Shortness Of Breath 04/18/2015    Ciprofloxacin hcl  01/21/2016    Penicillins Rash 07/02/2021    Zosyn [piperacillin sod-tazobactam so] Hives and Rash 04/18/2015            The following portions of the patient's history were reviewed and updated as appropriate: allergies, current medications, past family history, past medical history, past social history, past surgical history and problem list      Past Medical History:   Diagnosis Date    GERD (gastroesophageal reflux disease)     Obesity        Past Surgical History:   Procedure Laterality Date    HERNIA REPAIR      POSTERIOR LAMINECTOMY THORACIC AND LUMBAR SPINE Left 2/13/2019    Procedure: LUMBAR LAMINOTOMY/DISCECTOMY L5-S1, LEFT;  Surgeon: Onel Basurto MD;  [facility];  Service: Neurosurgery    RI COLONOSCOPY FLX DX W/COLLJ SPEC WHEN PFRMD N/A 12/20/2018    Procedure: COLONOSCOPY;  Surgeon: Janie Bauer MD;  [facility]; Service: Gastroenterology    RI ESOPHAGOGASTRODUODENOSCOPY TRANSORAL DIAGNOSTIC N/A 12/20/2018    Procedure: ESOPHAGOGASTRODUODENOSCOPY (EGD); Surgeon: Janie Bauer MD;  [facility]; Service: Gastroenterology    TUBAL LIGATION         Family History   Problem Relation Age of Onset    Diabetes Mother     Diabetes Father     Diabetes Sister     Coronary artery disease Paternal Grandmother          Medications have been verified  Objective   /86   Pulse 81   Temp 98 1 °F (36 7 °C)   Resp 16   LMP 06/18/2021   SpO2 98%   Patient's last menstrual period was 06/18/2021  Physical Exam     Physical Exam  Musculoskeletal:         General: Tenderness (with palpation of the B/L lower back and lumbar spine  Surgical scar present along the lumbar spine  well healed  patient stated the scar has always been hypersensitive to touch ) present  No swelling  Comments: Decreased flexion at the waist d/t pain on the lower back  No weakness of the lower extremities with flexion/extension   Neurological:      Gait: Gait abnormal (walks gingerly due back pain)

## 2021-07-29 ENCOUNTER — HOSPITAL ENCOUNTER (EMERGENCY)
Facility: HOSPITAL | Age: 31
Discharge: HOME | End: 2021-07-30
Attending: EMERGENCY MEDICINE | Admitting: EMERGENCY MEDICINE
Payer: COMMERCIAL

## 2021-07-29 DIAGNOSIS — K52.9 GASTROENTERITIS: Primary | ICD-10-CM

## 2021-07-29 DIAGNOSIS — R11.0 NAUSEA: ICD-10-CM

## 2021-07-29 DIAGNOSIS — R10.9 ABDOMINAL PAIN: ICD-10-CM

## 2021-07-29 LAB
ALBUMIN SERPL BCP-MCNC: 4 G/DL (ref 3.5–5)
ALP SERPL-CCNC: 75 U/L (ref 46–116)
ALT SERPL W P-5'-P-CCNC: 44 U/L (ref 12–78)
ANION GAP SERPL CALCULATED.3IONS-SCNC: 7 MMOL/L (ref 4–13)
AST SERPL W P-5'-P-CCNC: 27 U/L (ref 5–45)
BASOPHILS # BLD AUTO: 0.04 THOUSANDS/ΜL (ref 0–0.1)
BASOPHILS NFR BLD AUTO: 0 % (ref 0–1)
BILIRUB SERPL-MCNC: 0.36 MG/DL (ref 0.2–1)
BILIRUB UR QL STRIP: NEGATIVE
BUN SERPL-MCNC: 15 MG/DL (ref 5–25)
CALCIUM SERPL-MCNC: 9.3 MG/DL (ref 8.3–10.1)
CHLORIDE SERPL-SCNC: 105 MMOL/L (ref 100–108)
CLARITY UR: CLEAR
CO2 SERPL-SCNC: 28 MMOL/L (ref 21–32)
COLOR UR: YELLOW
CREAT SERPL-MCNC: 0.86 MG/DL (ref 0.6–1.3)
EOSINOPHIL # BLD AUTO: 0.16 THOUSAND/ΜL (ref 0–0.61)
EOSINOPHIL NFR BLD AUTO: 1 % (ref 0–6)
ERYTHROCYTE [DISTWIDTH] IN BLOOD BY AUTOMATED COUNT: 12.4 % (ref 11.6–15.1)
GFR SERPL CREATININE-BSD FRML MDRD: 90 ML/MIN/1.73SQ M
GLUCOSE SERPL-MCNC: 95 MG/DL (ref 65–140)
GLUCOSE UR STRIP-MCNC: NEGATIVE MG/DL
HCT VFR BLD AUTO: 45.1 % (ref 34.8–46.1)
HGB BLD-MCNC: 15 G/DL (ref 11.5–15.4)
HGB UR QL STRIP.AUTO: NEGATIVE
IMM GRANULOCYTES # BLD AUTO: 0.06 THOUSAND/UL (ref 0–0.2)
IMM GRANULOCYTES NFR BLD AUTO: 0 % (ref 0–2)
KETONES UR STRIP-MCNC: NEGATIVE MG/DL
LEUKOCYTE ESTERASE UR QL STRIP: NEGATIVE
LIPASE SERPL-CCNC: 155 U/L (ref 73–393)
LYMPHOCYTES # BLD AUTO: 1.18 THOUSANDS/ΜL (ref 0.6–4.47)
LYMPHOCYTES NFR BLD AUTO: 9 % (ref 14–44)
MCH RBC QN AUTO: 32.5 PG (ref 26.8–34.3)
MCHC RBC AUTO-ENTMCNC: 33.3 G/DL (ref 31.4–37.4)
MCV RBC AUTO: 98 FL (ref 82–98)
MONOCYTES # BLD AUTO: 0.92 THOUSAND/ΜL (ref 0.17–1.22)
MONOCYTES NFR BLD AUTO: 7 % (ref 4–12)
NEUTROPHILS # BLD AUTO: 11.19 THOUSANDS/ΜL (ref 1.85–7.62)
NEUTS SEG NFR BLD AUTO: 83 % (ref 43–75)
NITRITE UR QL STRIP: NEGATIVE
NRBC BLD AUTO-RTO: 0 /100 WBCS
PH UR STRIP.AUTO: 8 [PH]
PLATELET # BLD AUTO: 212 THOUSANDS/UL (ref 149–390)
PMV BLD AUTO: 10.3 FL (ref 8.9–12.7)
POTASSIUM SERPL-SCNC: 4 MMOL/L (ref 3.5–5.3)
PROT SERPL-MCNC: 8.2 G/DL (ref 6.4–8.2)
PROT UR STRIP-MCNC: NEGATIVE MG/DL
RBC # BLD AUTO: 4.62 MILLION/UL (ref 3.81–5.12)
SODIUM SERPL-SCNC: 140 MMOL/L (ref 136–145)
SP GR UR STRIP.AUTO: 1.02 (ref 1–1.03)
UROBILINOGEN UR QL STRIP.AUTO: 1 E.U./DL
WBC # BLD AUTO: 13.55 THOUSAND/UL (ref 4.31–10.16)

## 2021-07-29 PROCEDURE — 96374 THER/PROPH/DIAG INJ IV PUSH: CPT

## 2021-07-29 PROCEDURE — 99284 EMERGENCY DEPT VISIT MOD MDM: CPT | Performed by: EMERGENCY MEDICINE

## 2021-07-29 PROCEDURE — 36415 COLL VENOUS BLD VENIPUNCTURE: CPT | Performed by: STUDENT IN AN ORGANIZED HEALTH CARE EDUCATION/TRAINING PROGRAM

## 2021-07-29 PROCEDURE — 83690 ASSAY OF LIPASE: CPT | Performed by: STUDENT IN AN ORGANIZED HEALTH CARE EDUCATION/TRAINING PROGRAM

## 2021-07-29 PROCEDURE — 99284 EMERGENCY DEPT VISIT MOD MDM: CPT

## 2021-07-29 PROCEDURE — 81003 URINALYSIS AUTO W/O SCOPE: CPT | Performed by: STUDENT IN AN ORGANIZED HEALTH CARE EDUCATION/TRAINING PROGRAM

## 2021-07-29 PROCEDURE — 85025 COMPLETE CBC W/AUTO DIFF WBC: CPT | Performed by: STUDENT IN AN ORGANIZED HEALTH CARE EDUCATION/TRAINING PROGRAM

## 2021-07-29 PROCEDURE — 80053 COMPREHEN METABOLIC PANEL: CPT | Performed by: STUDENT IN AN ORGANIZED HEALTH CARE EDUCATION/TRAINING PROGRAM

## 2021-07-29 PROCEDURE — 76705 ECHO EXAM OF ABDOMEN: CPT | Performed by: EMERGENCY MEDICINE

## 2021-07-29 PROCEDURE — 96375 TX/PRO/DX INJ NEW DRUG ADDON: CPT

## 2021-07-29 RX ORDER — ONDANSETRON 2 MG/ML
4 INJECTION INTRAMUSCULAR; INTRAVENOUS ONCE
Status: COMPLETED | OUTPATIENT
Start: 2021-07-29 | End: 2021-07-29

## 2021-07-29 RX ORDER — KETOROLAC TROMETHAMINE 30 MG/ML
15 INJECTION, SOLUTION INTRAMUSCULAR; INTRAVENOUS ONCE
Status: COMPLETED | OUTPATIENT
Start: 2021-07-29 | End: 2021-07-29

## 2021-07-29 RX ADMIN — KETOROLAC TROMETHAMINE 15 MG: 30 INJECTION, SOLUTION INTRAMUSCULAR; INTRAVENOUS at 23:18

## 2021-07-29 RX ADMIN — ONDANSETRON 4 MG: 2 INJECTION INTRAMUSCULAR; INTRAVENOUS at 23:19

## 2021-07-29 NOTE — Clinical Note
Arnold Hernandez accompanied Alecia Garcia to the emergency department on 7/29/2021  Return date if applicable: 75/41/3772        If you have any questions or concerns, please don't hesitate to call        Lauren Pleasure, DO

## 2021-07-29 NOTE — Clinical Note
Demarcuskhoi Nabil was seen and treated in our emergency department on 7/29/2021  Diagnosis:     Merari Flood  may return to work on return date  She may return on this date: 07/31/2021         If you have any questions or concerns, please don't hesitate to call        Alyssa Nash, DO    ______________________________           _______________          _______________  Hospital Representative                              Date                                Time

## 2021-07-30 VITALS
BODY MASS INDEX: 37.16 KG/M2 | HEART RATE: 88 BPM | WEIGHT: 184 LBS | SYSTOLIC BLOOD PRESSURE: 116 MMHG | OXYGEN SATURATION: 93 % | RESPIRATION RATE: 18 BRPM | TEMPERATURE: 97.9 F | DIASTOLIC BLOOD PRESSURE: 67 MMHG

## 2021-07-30 NOTE — ED ATTENDING ATTESTATION
7/29/2021  ISandie MD, saw and evaluated the patient  I have discussed the patient with the resident/non-physician practitioner and agree with the resident's/non-physician practitioner's findings, Plan of Care, and MDM as documented in the resident's/non-physician practitioner's note, except where noted  All available labs and Radiology studies were reviewed  I was present for key portions of any procedure(s) performed by the resident/non-physician practitioner and I was immediately available to provide assistance  At this point I agree with the current assessment done in the Emergency Department  I have conducted an independent evaluation of this patient a history and physical is as follows:  Patient here with vomiting, diarrhea, headache  For episodes of diarrhea, numerous episodes of vomiting  Patient had a headache preceding the symptoms  No fevers or chills  No known sick contacts  No urinary symptoms  Review of systems negative otherwise and 12 systems reviewed  On exam the patient has diffuse abdominal tenderness with no rebound, guarding, or masses  Impression:  Likely gastroenteritis    Plan to check labs, bedside ultrasound, symptomatic treatment  ED Course         Critical Care Time  Procedures

## 2021-07-30 NOTE — ED PROVIDER NOTES
History  Chief Complaint   Patient presents with    Abdominal Pain     reports vomiting, diarrhea, headache starting this afternoon after eating lunch  no fevers  reports upper GI pain     HPI  26-year-old female past medical history of irritable bowel syndrome and obesity presents with complaint of nausea vomiting diarrhea  Patient states that after she ate dinner she had burning epigastric pain vomiting and diarrhea  She has tried Prilosec and Pepto which has not helped  Pain reminds her of her IBS pain  Patient is not vomited or had about diarrhea here in the ED  She denies any fevers, chills, shortness of breath, chest pain, back pain, numbness tingling in the extremities  Prior to Admission Medications   Prescriptions Last Dose Informant Patient Reported? Taking?    DICLOFENAC SODIUM ER PO   Yes No   Sig: Take by mouth   DULoxetine (CYMBALTA) 30 mg delayed release capsule   Yes No   Sig: Take 30 mg by mouth daily   acetaminophen (TYLENOL) 325 mg tablet Not Taking at Unknown time Self No No   Sig: Take 3 tablets (975 mg total) by mouth every 8 (eight) hours   Patient not taking: Reported on 7/29/2021   baclofen 10 mg tablet  Self No No   Sig: Take one tablet by mouth every 8 hours for back and leg spasm   Patient not taking: Reported on 2/17/2020   baclofen 20 mg tablet  Self Yes No   Sig: TAKE 1 TABLET BY MOUTH TWICE A DAY AS NEEDED FOR PAIN/SPASM   esomeprazole (NexIUM) 10 MG packet  Self Yes No   Sig: Take by mouth every morning before breakfast   gabapentin (NEURONTIN) 100 mg capsule Not Taking at Unknown time Self No No   Sig: Take 1 capsule (100 mg total) by mouth 3 (three) times a day   Patient not taking: Reported on 2/17/2020   gabapentin (NEURONTIN) 300 mg capsule  Self Yes No   Sig: TAKE 1 TABLET BY MOUTH IN THE MORNING AND TAKE 2 TABLETS AT BEDTIME   hyoscyamine (ANASPAZ,LEVSIN) 0 125 MG tablet  Self Yes No   Sig: TAKE 1 TABLET BY MOUTH 3 TIMES A DAY AS NEEDED FOR LOOSE BOWELS   ibuprofen (MOTRIN) 600 mg tablet  Self Yes No   Sig: Take by mouth as needed   meloxicam (MOBIC) 15 mg tablet  Self Yes No   Sig: Take 15 mg by mouth daily   omeprazole (PriLOSEC) 20 mg delayed release capsule  Self Yes No   Sig: Take 1 tablet by mouth daily   predniSONE 20 mg tablet   No No   Si tab TID x 2 days, then BID x 2 days, then daily x 2 days      Facility-Administered Medications: None       Past Medical History:   Diagnosis Date    GERD (gastroesophageal reflux disease)     Obesity        Past Surgical History:   Procedure Laterality Date    HERNIA REPAIR      POSTERIOR LAMINECTOMY THORACIC AND LUMBAR SPINE Left 2019    Procedure: LUMBAR LAMINOTOMY/DISCECTOMY L5-S1, LEFT;  Surgeon: Omkar Pimentel MD;  [facility];  Service: Neurosurgery    MA COLONOSCOPY FLX DX W/COLLJ SPEC WHEN PFRMD N/A 2018    Procedure: COLONOSCOPY;  Surgeon: Johnathan Wild MD;  [facility]; Service: Gastroenterology    MA ESOPHAGOGASTRODUODENOSCOPY TRANSORAL DIAGNOSTIC N/A 2018    Procedure: ESOPHAGOGASTRODUODENOSCOPY (EGD); Surgeon: Johnathan Wild MD;  [facility]; Service: Gastroenterology    TUBAL LIGATION         Family History   Problem Relation Age of Onset    Diabetes Mother     Diabetes Father     Diabetes Sister     Coronary artery disease Paternal Grandmother      I have reviewed and agree with the history as documented  E-Cigarette/Vaping    E-Cigarette Use Never User      E-Cigarette/Vaping Substances     Social History     Tobacco Use    Smoking status: Never Smoker    Smokeless tobacco: Never Used   Vaping Use    Vaping Use: Never used   Substance Use Topics    Alcohol use: No    Drug use: No        Review of Systems   Constitutional: Negative for chills and fever  HENT: Negative for congestion, ear pain, rhinorrhea and sore throat  Eyes: Negative for pain     Respiratory: Negative for apnea, cough, choking, chest tightness, shortness of breath, wheezing and stridor  Cardiovascular: Negative for chest pain, palpitations and leg swelling  Gastrointestinal: Positive for abdominal pain, diarrhea, nausea and vomiting  Negative for constipation  Genitourinary: Negative for hematuria  Musculoskeletal: Negative for arthralgias and back pain  Skin: Negative for rash and wound  Neurological: Positive for headaches  Negative for dizziness  Psychiatric/Behavioral: Negative for agitation and hallucinations  All other systems reviewed and are negative  Physical Exam  ED Triage Vitals   Temperature Pulse Respirations Blood Pressure SpO2   07/29/21 2144 07/29/21 2144 07/29/21 2144 07/29/21 2144 07/29/21 2144   97 9 °F (36 6 °C) 98 20 135/84 95 %      Temp src Heart Rate Source Patient Position - Orthostatic VS BP Location FiO2 (%)   -- 07/30/21 0015 07/30/21 0015 07/30/21 0015 --    Monitor Lying Left arm       Pain Score       07/29/21 2144       Worst Possible Pain             Orthostatic Vital Signs  Vitals:    07/29/21 2144 07/29/21 2200 07/30/21 0015   BP: 135/84 121/76 116/67   Pulse: 98 93 88   Patient Position - Orthostatic VS:   Lying       Physical Exam  Vitals reviewed  Constitutional:       General: She is not in acute distress  Appearance: She is well-developed  HENT:      Head: Normocephalic and atraumatic  Right Ear: External ear normal       Left Ear: External ear normal       Nose: Nose normal       Mouth/Throat:      Mouth: Mucous membranes are moist    Eyes:      Extraocular Movements: Extraocular movements intact  Conjunctiva/sclera: Conjunctivae normal       Pupils: Pupils are equal, round, and reactive to light  Cardiovascular:      Rate and Rhythm: Normal rate and regular rhythm  Heart sounds: Normal heart sounds  Pulmonary:      Effort: Pulmonary effort is normal  No respiratory distress  Breath sounds: Normal breath sounds  No wheezing or rales  Abdominal:      Palpations: Abdomen is soft  Tenderness: There is abdominal tenderness (Patient is most profoundly tender in the epigastric and right upper quadrants) in the right upper quadrant and epigastric area  Musculoskeletal:         General: Normal range of motion  Cervical back: Normal range of motion and neck supple  No rigidity  Skin:     General: Skin is warm  Neurological:      General: No focal deficit present  Mental Status: She is alert and oriented to person, place, and time     Psychiatric:         Mood and Affect: Mood normal          ED Medications  Medications   ondansetron (ZOFRAN) injection 4 mg (4 mg Intravenous Given 7/29/21 2319)   ketorolac (TORADOL) injection 15 mg (15 mg Intravenous Given 7/29/21 2318)       Diagnostic Studies  Results Reviewed     Procedure Component Value Units Date/Time    UA w Reflex to Microscopic w Reflex to Culture [[id number]] Collected: 07/29/21 2324    Lab Status: Final result Specimen: Urine, Clean Catch Updated: 07/29/21 2346     Color, UA Yellow     Clarity, UA Clear     Specific Gravity, UA 1 024     pH, UA 8 0     Leukocytes, UA Negative     Nitrite, UA Negative     Protein, UA Negative mg/dl      Glucose, UA Negative mg/dl      Ketones, UA Negative mg/dl      Urobilinogen, UA 1 0 E U /dl      Bilirubin, UA Negative     Blood, UA Negative    Comprehensive metabolic panel [[id number]] Collected: 07/29/21 2310    Lab Status: Final result Specimen: Blood from Arm, Right Updated: 07/29/21 2342     Sodium 140 mmol/L      Potassium 4 0 mmol/L      Chloride 105 mmol/L      CO2 28 mmol/L      ANION GAP 7 mmol/L      BUN 15 mg/dL      Creatinine 0 86 mg/dL      Glucose 95 mg/dL      Calcium 9 3 mg/dL      AST 27 U/L      ALT 44 U/L      Alkaline Phosphatase 75 U/L      Total Protein 8 2 g/dL      Albumin 4 0 g/dL      Total Bilirubin 0 36 mg/dL      eGFR 90 ml/min/1 73sq m     Narrative:      [place] guidelines for Chronic Kidney Disease (CKD):     Stage 1 with normal or high GFR (GFR > 90 mL/min/1 73 square meters)    Stage 2 Mild CKD (GFR = 60-89 mL/min/1 73 square meters)    Stage 3A Moderate CKD (GFR = 45-59 mL/min/1 73 square meters)    Stage 3B Moderate CKD (GFR = 30-44 mL/min/1 73 square meters)    Stage 4 Severe CKD (GFR = 15-29 mL/min/1 73 square meters)    Stage 5 End Stage CKD (GFR <15 mL/min/1 73 square meters)  Note: GFR calculation is accurate only with a steady state creatinine    Lipase [[id number]]  (Normal) Collected: 07/29/21 2310    Lab Status: Final result Specimen: Blood from Arm, Right Updated: 07/29/21 2342     Lipase 155 u/L     CBC and differential [[id number]]  (Abnormal) Collected: 07/29/21 2310    Lab Status: Final result Specimen: Blood from Arm, Right Updated: 07/29/21 2335     WBC 13 55 Thousand/uL      RBC 4 62 Million/uL      Hemoglobin 15 0 g/dL      Hematocrit 45 1 %      MCV 98 fL      MCH 32 5 pg      MCHC 33 3 g/dL      RDW 12 4 %      MPV 10 3 fL      Platelets 325 Thousands/uL      nRBC 0 /100 WBCs      Neutrophils Relative 83 %      Immat GRANS % 0 %      Lymphocytes Relative 9 %      Monocytes Relative 7 %      Eosinophils Relative 1 %      Basophils Relative 0 %      Neutrophils Absolute 11 19 Thousands/µL      Immature Grans Absolute 0 06 Thousand/uL      Lymphocytes Absolute 1 18 Thousands/µL      Monocytes Absolute 0 92 Thousand/µL      Eosinophils Absolute 0 16 Thousand/µL      Basophils Absolute 0 04 Thousands/µL                  No orders to display         Procedures  POC Biliary US    Date/Time: 7/30/2021 12:35 AM  Performed by: Michelle King DO  Authorized by: Michelle King DO     Patient location:  ED  Procedure details:     Exam Type:  Diagnostic and educational    Indications: upper right quadrant abdominal pain      Assessment for:  Cholecystitis and cholelithiasis    Views obtained: gallbladder (transverse and longitudinal), liver and common bile duct      Image quality: diagnostic      Image availability:  Images available in PACS  Findings:     Cholelithiasis: not identified      Common bile duct:  Normal    Common bile duct diameter (mm):  4    Gallbladder wall thickness (mm):  2    Pericholecystic fluid: not identified      Sonographic Parks's sign: negative    Interpretation:     Biliary ultrasound impressions: normal gallbladder ultrasound            ED Course                             SBIRT 20yo+      Most Recent Value   SBIRT (22 yo +)   In order to provide better care to our patients, we are screening all of our patients for alcohol and drug use  Would it be okay to ask you these screening questions? Yes Filed at: 07/29/2021 2150   Initial Alcohol Screen: US AUDIT-C    1  How often do you have a drink containing alcohol?  0 Filed at: 07/29/2021 2150   2  How many drinks containing alcohol do you have on a typical day you are drinking? 0 Filed at: 07/29/2021 2150   3a  Male UNDER 65: How often do you have five or more drinks on one occasion? 0 Filed at: 07/29/2021 2150   3b  FEMALE Any Age, or MALE 65+: How often do you have 4 or more drinks on one occassion? 0 Filed at: 07/29/2021 2150   Audit-C Score  0 Filed at: 07/29/2021 2150   SUNDAY: How many times in the past year have you    Used an illegal drug or used a prescription medication for non-medical reasons? Never Filed at: 07/29/2021 2150                MDM  Number of Diagnoses or Management Options  50-year-old female past medical history of irritable bowel syndrome and obesity presents with complaint of nausea vomiting diarrhea  Differential gastritis, gastroenteritis, pancreatitis, cholelithiasis    Lipase normal level  Patient has a slight white count likely attributed to nausea vomiting    Bedside ultrasound does not show any signs of coli cystitis or cholelithiasis    Patient is exhibiting symptoms of gastroenteritis  Patient feels some relief with ED medications  Patient is discharged and given follow-up with her PCP      Disposition  Final diagnoses:   Gastroenteritis   Nausea   Abdominal pain     Time reflects when diagnosis was documented in both MDM as applicable and the Disposition within this note     Time User Action Codes Description Comment    7/30/2021 12:44 AM Sagrario Rowan Add [K52 9] Gastroenteritis     7/30/2021 12:44 AM Sagrario Rowan Add [R11 0] Nausea     7/30/2021 12:44 AM Sagrario Rowan Add [R10 9] Abdominal pain       ED Disposition     ED Disposition Condition Date/Time Comment    Discharge Stable Fri Jul 30, 2021 12:45 AM Isabela Skelton discharge to home/self care              Follow-up Information     Follow up With Specialties Details Why [address], [place] Nurse Practitioner  As needed [address] [facility]  [phone]            Discharge Medication List as of 7/30/2021 12:45 AM      CONTINUE these medications which have NOT CHANGED    Details   acetaminophen (TYLENOL) 325 mg tablet Take 3 tablets (975 mg total) by mouth every 8 (eight) hours, Starting Sat 2/16/2019, No Print      !! baclofen 10 mg tablet Take one tablet by mouth every 8 hours for back and leg spasm, Normal      !! baclofen 20 mg tablet TAKE 1 TABLET BY MOUTH TWICE A DAY AS NEEDED FOR PAIN/SPASM, Historical Med      DICLOFENAC SODIUM ER PO Take by mouth, Historical Med      DULoxetine (CYMBALTA) 30 mg delayed release capsule Take 30 mg by mouth daily, Historical Med      esomeprazole (NexIUM) 10 MG packet Take by mouth every morning before breakfast, Historical Med      !! gabapentin (NEURONTIN) 100 mg capsule Take 1 capsule (100 mg total) by mouth 3 (three) times a day, Starting Sat 2/16/2019, No Print      !! gabapentin (NEURONTIN) 300 mg capsule TAKE 1 TABLET BY MOUTH IN THE MORNING AND TAKE 2 TABLETS AT BEDTIME, Historical Med      hyoscyamine (ANASPAZ,LEVSIN) 0 125 MG tablet TAKE 1 TABLET BY MOUTH 3 TIMES A DAY AS NEEDED FOR LOOSE BOWELS, Historical Med      ibuprofen (MOTRIN) 600 mg tablet Take by mouth as needed, Starting Mon 4/4/2016, Historical Med      meloxicam (MOBIC) 15 mg tablet Take 15 mg by mouth daily, Historical Med      omeprazole (PriLOSEC) 20 mg delayed release capsule Take 1 tablet by mouth daily, Starting Tue 2/28/2017, Historical Med      predniSONE 20 mg tablet 1 tab TID x 2 days, then BID x 2 days, then daily x 2 days, Normal       !! - Potential duplicate medications found  Please discuss with provider  No discharge procedures on file  PDMP Review     None           ED Provider  Attending physically available and evaluated Lacy Chairez I managed the patient along with the ED Attending      Electronically Signed by         John Paul Fernandez DO  08/03/21 1911

## 2021-07-30 NOTE — DISCHARGE INSTRUCTIONS
Emergency department if you experience increased nausea and vomiting to the point where your unable to eat or drink liquids  You may return to the emergency department if you experience increasing abdominal pain  Please schedule an appointment with your primary care doctor as needed

## 2022-01-25 ENCOUNTER — HOSPITAL ENCOUNTER (EMERGENCY)
Facility: HOSPITAL | Age: 32
Discharge: HOME | End: 2022-01-25
Attending: EMERGENCY MEDICINE
Payer: COMMERCIAL

## 2022-01-25 VITALS
SYSTOLIC BLOOD PRESSURE: 146 MMHG | RESPIRATION RATE: 18 BRPM | OXYGEN SATURATION: 98 % | HEART RATE: 97 BPM | DIASTOLIC BLOOD PRESSURE: 76 MMHG

## 2022-01-25 DIAGNOSIS — S93.602A FOOT SPRAIN, LEFT, INITIAL ENCOUNTER: Primary | ICD-10-CM

## 2022-01-25 PROCEDURE — 99282 EMERGENCY DEPT VISIT SF MDM: CPT | Performed by: EMERGENCY MEDICINE

## 2022-01-25 PROCEDURE — 99283 EMERGENCY DEPT VISIT LOW MDM: CPT

## 2022-01-25 RX ORDER — ACETAMINOPHEN 325 MG/1
975 TABLET ORAL ONCE
Status: COMPLETED | OUTPATIENT
Start: 2022-01-25 | End: 2022-01-25

## 2022-01-25 RX ADMIN — ACETAMINOPHEN 975 MG: 325 TABLET, FILM COATED ORAL at 03:44

## 2022-01-25 NOTE — DISCHARGE INSTRUCTIONS
Thank you for coming to the ED tonight  It is likely that you sprained one of the small ligaments in your foot  XR not indicated at this time because there was no trauma  I advise wearing ace wrap and to rest, ice, elevate the foot at home  Continue to take Motrin (or Naprosyn) and Tylenol at home for pain control  Please schedule an appointment with Podiatry  (foot specialist) if the pain is not resolved in a few days  Please return to the ED if you develop worsening pain despite these measures, or calf pain/swelling

## 2022-01-25 NOTE — ED PROVIDER NOTES
History  Chief Complaint   Patient presents with    Foot Pain     Left sided foot pain  Patient is a 27year old female presenting to the ED for evaluation of L foot pain  Patient states yesterday morning at 1000am, she was squatting in front of the laundry machine, when she teetered on her feet a bit, stood up and experienced "electric" pain in the L lateral foot that radiated to the shin  Since then, patient has been experiencing worsening pain in the foot  She has been able to walk, but with antalgic gait  No direct trauma or twisting motion of the foot to cause the pain  She has been taking Naprosyn yesterday, last dose 1 5 hrs prior to arrival, but with only transient improvement  Patient could not sleep tonight due to the pain, prompting ED evaluation  She has not taken any Tylenol  Otherwise no other injury  She denies calf pain/swelling  Prior to Admission Medications   Prescriptions Last Dose Informant Patient Reported? Taking?    DICLOFENAC SODIUM ER PO   Yes No   Sig: Take by mouth   DULoxetine (CYMBALTA) 30 mg delayed release capsule   Yes No   Sig: Take 30 mg by mouth daily   acetaminophen (TYLENOL) 325 mg tablet  Self No No   Sig: Take 3 tablets (975 mg total) by mouth every 8 (eight) hours   Patient not taking: Reported on 7/29/2021   baclofen 10 mg tablet  Self No No   Sig: Take one tablet by mouth every 8 hours for back and leg spasm   Patient not taking: Reported on 2/17/2020   baclofen 20 mg tablet  Self Yes No   Sig: TAKE 1 TABLET BY MOUTH TWICE A DAY AS NEEDED FOR PAIN/SPASM   esomeprazole (NexIUM) 10 MG packet  Self Yes No   Sig: Take by mouth every morning before breakfast   gabapentin (NEURONTIN) 100 mg capsule  Self No No   Sig: Take 1 capsule (100 mg total) by mouth 3 (three) times a day   Patient not taking: Reported on 2/17/2020   gabapentin (NEURONTIN) 300 mg capsule  Self Yes No   Sig: TAKE 1 TABLET BY MOUTH IN THE MORNING AND TAKE 2 TABLETS AT BEDTIME   hyoscyamine (ANASPAZ,LEVSIN) 0 125 MG tablet  Self Yes No   Sig: TAKE 1 TABLET BY MOUTH 3 TIMES A DAY AS NEEDED FOR LOOSE BOWELS   ibuprofen (MOTRIN) 600 mg tablet  Self Yes No   Sig: Take by mouth as needed   meloxicam (MOBIC) 15 mg tablet  Self Yes No   Sig: Take 15 mg by mouth daily   omeprazole (PriLOSEC) 20 mg delayed release capsule  Self Yes No   Sig: Take 1 tablet by mouth daily   predniSONE 20 mg tablet   No No   Si tab TID x 2 days, then BID x 2 days, then daily x 2 days      Facility-Administered Medications: None       Past Medical History:   Diagnosis Date    GERD (gastroesophageal reflux disease)     Obesity        Past Surgical History:   Procedure Laterality Date    HERNIA REPAIR      POSTERIOR LAMINECTOMY THORACIC AND LUMBAR SPINE Left 2019    Procedure: LUMBAR LAMINOTOMY/DISCECTOMY L5-S1, LEFT;  Surgeon: Saintclair Sports, MD;  [facility];  Service: Neurosurgery    MN COLONOSCOPY FLX DX W/COLLJ SPEC WHEN PFRMD N/A 2018    Procedure: COLONOSCOPY;  Surgeon: Min Azevedo MD;  [facility]; Service: Gastroenterology    MN ESOPHAGOGASTRODUODENOSCOPY TRANSORAL DIAGNOSTIC N/A 2018    Procedure: ESOPHAGOGASTRODUODENOSCOPY (EGD); Surgeon: Min Azevedo MD;  [facility]; Service: Gastroenterology    TUBAL LIGATION         Family History   Problem Relation Age of Onset    Diabetes Mother     Diabetes Father     Diabetes Sister     Coronary artery disease Paternal Grandmother      I have reviewed and agree with the history as documented  E-Cigarette/Vaping    E-Cigarette Use Never User      E-Cigarette/Vaping Substances     Social History     Tobacco Use    Smoking status: Never Smoker    Smokeless tobacco: Never Used   Vaping Use    Vaping Use: Never used   Substance Use Topics    Alcohol use: No    Drug use: No        Review of Systems   Constitutional: Negative for chills and fever  HENT: Negative for ear pain and sore throat      Eyes: Negative for pain and visual disturbance  Respiratory: Negative for cough and shortness of breath  Cardiovascular: Negative for chest pain and palpitations  Gastrointestinal: Negative for abdominal pain and vomiting  Genitourinary: Negative for dysuria and hematuria  Musculoskeletal: Negative for arthralgias and back pain  Skin: Negative for color change and rash  Neurological: Negative for seizures and syncope  All other systems reviewed and are negative  Physical Exam  ED Triage Vitals [01/25/22 0314]   Temp Pulse Respirations Blood Pressure SpO2   -- 97 18 146/76 98 %      Temp src Heart Rate Source Patient Position - Orthostatic VS BP Location FiO2 (%)   -- -- -- -- --      Pain Score       8             Orthostatic Vital Signs  Vitals:    01/25/22 0314   BP: 146/76   Pulse: 97       Physical Exam  Vitals and nursing note reviewed  Constitutional:       General: She is not in acute distress  Appearance: She is well-developed  HENT:      Head: Normocephalic and atraumatic  Right Ear: External ear normal       Left Ear: External ear normal       Nose: Nose normal       Mouth/Throat:      Mouth: Mucous membranes are moist       Pharynx: Oropharynx is clear  Eyes:      Conjunctiva/sclera: Conjunctivae normal    Pulmonary:      Effort: Pulmonary effort is normal  No respiratory distress  Abdominal:      Palpations: Abdomen is soft  Musculoskeletal:      Cervical back: Neck supple  Left foot: Normal range of motion  No deformity  Feet:      Left foot:      Skin integrity: No ulcer or blister  Comments: Minimal tenderness near the lateral malleolus with radiation of pain toward distal proximal fibula on palpation  No specific ligamentous tenderness  No achilles tendon tenderness  Full ROM of toes  Full ROM of foot including eversion and inversion  Strength 5/5 plantar and dorsiflexion      2+ DP pulse    No tenderness of the plantar aspect of the foot, including 5th tubercle  No metatarsal tenderness  Skin:     General: Skin is warm and dry  Neurological:      Mental Status: She is alert and oriented to person, place, and time  Psychiatric:         Mood and Affect: Mood normal          ED Medications  Medications   acetaminophen (TYLENOL) tablet 975 mg (975 mg Oral Given 1/25/22 0344)       Diagnostic Studies  Results Reviewed     None                 No orders to display         Procedures  Procedures      ED Course  ED Course as of 01/25/22 0358   e Jan 25, 2022   0355 Patient given Tylenol 975 mg PO  Ace wrap applied for stability  This is likely a small tendon or ligamentous strain  No direct trauma to the foot, imaging is not necessary at this time  Advised patient to continue Tylenol and Naprosyn at home and encouraged rest, ice, elevation  Patient given Podiatry follow up  She understands the plan and agrees with plan for discharge  SBIRT 20yo+      Most Recent Value   SBIRT (24 yo +)    In order to provide better care to our patients, we are screening all of our patients for alcohol and drug use  Would it be okay to ask you these screening questions? Yes Filed at: 01/25/2022 0346   Initial Alcohol Screen: US AUDIT-C     1  How often do you have a drink containing alcohol? 0 Filed at: 01/25/2022 0346   2  How many drinks containing alcohol do you have on a typical day you are drinking? 0 Filed at: 01/25/2022 0346   3b  FEMALE Any Age, or MALE 65+: How often do you have 4 or more drinks on one occassion? 0 Filed at: 01/25/2022 0346   Audit-C Score 0 Filed at: 01/25/2022 1707   SUNDAY: How many times in the past year have you    Used an illegal drug or used a prescription medication for non-medical reasons? Never Filed at: 01/25/2022 0346                MDM    Disposition  Final diagnoses:    Foot sprain, left, initial encounter     Time reflects when diagnosis was documented in both MDM as applicable and the Disposition within this note     Time User Action Codes Description Comment    1/25/2022  3:26 AM Maxx Hlilman Add [A69 569] Left foot pain     1/25/2022  3:26 AM Colette Tylor [Z84 721] Left foot pain     1/25/2022  3:26 AM Maxx Hillman Add [Z19 494H] Foot sprain, left, initial encounter       ED Disposition     ED Disposition Condition Date/Time Comment    Discharge Stable Tue Jan 25, 2022  3:40 AM Nguyễn Guallpa discharge to home/self care  Follow-up Information     Follow up With Specialties Details Why Contact Info Additional Xavier Hector, NELLYNP Nurse Practitioner Call in 2 days If symptoms worsen [address] [id number]       [facility] ED Schedule an appointment as soon as possible for a visit   [address] [place] [id number]  [address] [facility], [address] [place] [address], [id number] [phone]          Patient's Medications   Discharge Prescriptions    No medications on file     No discharge procedures on file  PDMP Review     None         ED Provider  Attending physically available and evaluated Nguyễn Guallpa I managed the patient along with the ED Attending      Electronically Signed by         Dennis Lu DO  01/25/22 7118

## 2022-01-25 NOTE — ED ATTENDING ATTESTATION
1/25/2022  Mala WATSON MD, saw and evaluated the patient  I have discussed the patient with the resident/non-physician practitioner and agree with the resident's/non-physician practitioner's findings, Plan of Care, and MDM as documented in the resident's/non-physician practitioner's note, except where noted  All available labs and Radiology studies were reviewed  I was present for key portions of any procedure(s) performed by the resident/non-physician practitioner and I was immediately available to provide assistance  At this point I agree with the current assessment done in the Emergency Department  I have conducted an independent evaluation of this patient a history and physical is as follows:    ED Course     Emergency Department Note- Carlos Arnold 27 y o  female MRN: [id number]    Unit/Bed#: ED 24 Encounter: [id number]    Carlos Arnold is a 27 y o  female who presents with   Chief Complaint   Patient presents with    Foot Pain     Left sided foot pain  History of Present Illness   HPI:  Carlos Arnold is a 27 y o  female who presents for evaluation of:  Atraumatic injury of the L lateral foot after squatting and then coming to a standing position  Patient took naprosyn earlier for pain that feels like an electric shock sensation; moderate intensity  Patient is able to ambulate  Review of Systems   Constitutional: Negative for chills and fever  HENT: Positive for congestion and rhinorrhea  Respiratory: Positive for cough  Negative for shortness of breath  Cardiovascular: Negative for chest pain and palpitations  Gastrointestinal: Negative for nausea and vomiting  All other systems reviewed and are negative        Historical Information   Past Medical History:   Diagnosis Date    GERD (gastroesophageal reflux disease)     Obesity      Past Surgical History:   Procedure Laterality Date    HERNIA REPAIR      POSTERIOR LAMINECTOMY THORACIC AND LUMBAR SPINE Left 2/13/2019    Procedure: LUMBAR LAMINOTOMY/DISCECTOMY L5-S1, LEFT;  Surgeon: Noemy Frank MD;  [facility]: [facility];  Service: Neurosurgery    TN COLONOSCOPY FLX DX W/COLLJ [facility] WHEN PFRMD N/A 12/20/2018    Procedure: COLONOSCOPY;  Surgeon: Mariya Shepard MD;  [facility]; Service: Gastroenterology    TN ESOPHAGOGASTRODUODENOSCOPY TRANSORAL DIAGNOSTIC N/A 12/20/2018    Procedure: ESOPHAGOGASTRODUODENOSCOPY (EGD); Surgeon: Mariya Shepard MD;  [facility]; Service: Gastroenterology    TUBAL LIGATION       Social History   Social History     Substance and Sexual Activity   Alcohol Use No     Social History     Substance and Sexual Activity   Drug Use No     Social History     Tobacco Use   Smoking Status Never Smoker   Smokeless Tobacco Never Used     Family History:   Family History   Problem Relation Age of Onset    Diabetes Mother     Diabetes Father     Diabetes Sister     Coronary artery disease Paternal Grandmother        Meds/Allergies   PTA meds:   Prior to Admission Medications   Prescriptions Last Dose Informant Patient Reported? Taking?    DICLOFENAC SODIUM ER PO   Yes No   Sig: Take by mouth   DULoxetine (CYMBALTA) 30 mg delayed release capsule   Yes No   Sig: Take 30 mg by mouth daily   acetaminophen (TYLENOL) 325 mg tablet  Self No No   Sig: Take 3 tablets (975 mg total) by mouth every 8 (eight) hours   Patient not taking: Reported on 7/29/2021   baclofen 10 mg tablet  Self No No   Sig: Take one tablet by mouth every 8 hours for back and leg spasm   Patient not taking: Reported on 2/17/2020   baclofen 20 mg tablet  Self Yes No   Sig: TAKE 1 TABLET BY MOUTH TWICE A DAY AS NEEDED FOR PAIN/SPASM   esomeprazole (NexIUM) 10 MG packet  Self Yes No   Sig: Take by mouth every morning before breakfast   gabapentin (NEURONTIN) 100 mg capsule  Self No No   Sig: Take 1 capsule (100 mg total) by mouth 3 (three) times a day   Patient not taking: Reported on 2/17/2020   gabapentin (NEURONTIN) 300 mg capsule  Self Yes No   Sig: TAKE 1 TABLET BY MOUTH IN THE MORNING AND TAKE 2 TABLETS AT BEDTIME   hyoscyamine (ANASPAZ,LEVSIN) 0 125 MG tablet  Self Yes No   Sig: TAKE 1 TABLET BY MOUTH 3 TIMES A DAY AS NEEDED FOR LOOSE BOWELS   ibuprofen (MOTRIN) 600 mg tablet  Self Yes No   Sig: Take by mouth as needed   meloxicam (MOBIC) 15 mg tablet  Self Yes No   Sig: Take 15 mg by mouth daily   omeprazole (PriLOSEC) 20 mg delayed release capsule  Self Yes No   Sig: Take 1 tablet by mouth daily   predniSONE 20 mg tablet   No No   Si tab TID x 2 days, then BID x 2 days, then daily x 2 days      Facility-Administered Medications: None     Allergies   Allergen Reactions    Septra [Sulfamethoxazole-Trimethoprim] Rash and Shortness Of Breath    Ciprofloxacin Hcl     Penicillins Rash    Zosyn [Piperacillin Sod-Tazobactam So] Hives and Rash       Objective   First Vitals:   Blood Pressure: 146/76 (22 0314)  Pulse: 97 (22)  Respirations: 18 (22)  SpO2: 98 % (22)    Current Vitals:   Blood Pressure: 146/76 (224)  Pulse: 97 (224)  Respirations: 18 (22)  SpO2: 98 % (22)    No intake or output data in the 24 hours ending 22    Invasive Devices  Report    None                 Physical Exam  Vitals and nursing note reviewed  Constitutional:       General: She is not in acute distress  Appearance: Normal appearance  She is well-developed  HENT:      Head: Normocephalic and atraumatic  Right Ear: External ear normal       Left Ear: External ear normal       Nose: Nose normal       Mouth/Throat:      Pharynx: No oropharyngeal exudate  Eyes:      Conjunctiva/sclera: Conjunctivae normal       Pupils: Pupils are equal, round, and reactive to light  Cardiovascular:      Rate and Rhythm: Normal rate and regular rhythm  Pulmonary:      Effort: Pulmonary effort is normal  No respiratory distress     Abdominal:      General: Abdomen is flat  There is no distension  Musculoskeletal:         General: No deformity  Normal range of motion  Cervical back: Normal range of motion and neck supple  Skin:     General: Skin is warm and dry  Capillary Refill: Capillary refill takes less than 2 seconds  Neurological:      General: No focal deficit present  Mental Status: She is alert and oriented to person, place, and time  Mental status is at baseline  Coordination: Coordination normal    Psychiatric:         Mood and Affect: Mood normal          Behavior: Behavior normal          Thought Content: Thought content normal          Judgment: Judgment normal      Medical Decision Makin  Left lateral foot strain: pain control; rest, ice    No results found for this or any previous visit (from the past 36 hour(s))  No orders to display         Portions of the record may have been created with voice recognition software  Occasional wrong word or "sound a like" substitutions may have occurred due to the inherent limitations of voice recognition software    Read the chart carefully and recognize, using context, where substitutions have occurred     '    Critical Care Time  Procedures

## 2022-02-07 NOTE — PROGRESS NOTES
Monitor. Progress Note - Neurosurgery   Nichole Castro 33 y o  female MRN: [id number]  Unit/Bed#: CW2 217-03 Encounter: [id number]    Assessment:  1  POD1 LUMBAR LAMINOTOMY/DISCECTOMY L5-S1, LEFT   2  L5/S1 disc herniation   3  Lumbosacral radiculopathy  4  Ambulatory dysfunction    Plan:  · Exam reveals mild proximal pain inhibition/weakness 4/5  Mild decreased PP L>R L5 distribution  dressing intact with expected mild postoperative drainage  · Imaging reviewed personally and by attending  Final results as below  · MRI lumbar spine wo 2/11/19:  Disc bolster at L5-S1 with cephalad migration causing central and left paracentral stenosis, left greater than right  · Maintain dressing until postoperative day three  At that time dressing may be removed incision may be left open to air  · Patient may shower after postoperative day three  · Pain control - continue current regimen  May consider changing baclofen to Robaxin 750 mg every 6 hours if poor pain control with recent dose of baclofen  · Recommend patient be discharged home on current oral regimen including Tylenol, gabapentin, p r n  Muscle relaxer and narcotic  · Mobilize with physical therapy  Patient has 15 steps at home  Okay to mobilize  No bed rest required  · DVT PPX:  SCDs  Patient may start pharmacological DVT prophylaxis if requires ongoing hospitalization  · Patient clear discharge from neurosurgical standpoint  Would need clearance from physical therapy and pain control  · Will see patient as needed remainder of hospitalization  Patient has schedule outpatient follow-up for two week and six week postoperative visit  · Call if any questions or concerns  Subjective/Objective   Chief Complaint:  Postoperative follow-up    Subjective: patient complaining of severe incisional low back pain with radiation to bilateral hip  Does note some radiation down posterior aspect of her left Friday with significant improvement from preoperative state  Continues with heaviness of her left greater than right leg  Continues with mild sensory changes L5-S1 distribution  No chest pain, shortness of breath, nausea or vomiting  Objective:  Lying in bed  NAD  I/O       02/12 0701 - 02/13 0700 02/13 0701 - 02/14 0700 02/14 0701 - 02/15 0700    P  O  0 0 187    I V  (mL/kg)  1100 (14 3)     Total Intake(mL/kg) 0 (0) 1100 (14 3) 187 (2 4)    Urine (mL/kg/hr)  2600 (1 4) 600 (1 9)    Blood  25     Total Output  2625 600    Net 0 -1525 -413           Unmeasured Urine Occurrence 1 x            Invasive Devices     Peripheral Intravenous Line            Peripheral IV 02/10/19 Right Antecubital 3 days    Peripheral IV 02/13/19 Left Hand less than 1 day                Physical Exam:  Vitals: Blood pressure 114/71, pulse 85, temperature 98 6 °F (37 °C), temperature source Oral, resp  rate 18, height 4' 11" (1 499 m), weight 77 1 kg (170 lb), last menstrual period 01/10/2019, SpO2 98 %  ,Body mass index is 34 34 kg/m²  General appearance: alert, appears stated age, cooperative and no distress  Head: Normocephalic, without obvious abnormality, atraumatic  Eyes:  Conjugate gaze and tracks appropriately room  Neck: supple, symmetrical, trachea midline   Back:  Dressing intact with expected mild serosanguineous drainage  Lungs: non labored breathing  Heart: regular heart rate  Neurologic:   Mental status: Alert, oriented, thought content appropriate  Sensory:  Decreased pinprick left greater than right L5, S1  Motor: moving all extremities with mild proximal leg weakness 4-4+ related to pain inhibition  Reflexes: 2+ and symmetric patellar, no clonus    JPS intact bilateral hallus    Lab Results:  Results from last 7 days   Lab Units 02/11/19  0548 02/10/19  1656   WBC Thousand/uL 17 32* 10 34*   HEMOGLOBIN g/dL 13 5 14 7   HEMATOCRIT % 41 0 44 8   PLATELETS Thousands/uL 213 208   NEUTROS PCT % 79* 84*   MONOS PCT % 7 3*     Results from last 7 days   Lab Units 02/11/19  0548 02/10/19  1656   POTASSIUM mmol/L 3 8 4 2   CHLORIDE mmol/L 108 105   CO2 mmol/L 26 28   BUN mg/dL 12 13   CREATININE mg/dL 0 72 0 83   CALCIUM mg/dL 8 7 8 9   ALK PHOS U/L  --  54   ALT U/L  --  29   AST U/L  --  21     Results from last 7 days   Lab Units 02/11/19  0548   MAGNESIUM mg/dL 2 2     Results from last 7 days   Lab Units 02/11/19  0548   PHOSPHORUS mg/dL 2 6*     Results from last 7 days   Lab Units 02/11/19  0023   INR  1 02   PTT seconds 25*     No results found for: TROPONINT  ABG:No results found for: PHART, ZWK7VNR, PO2ART, WLO9MQM, L2HXIRTG, BEART, SOURCE    Imaging Studies: I have personally reviewed pertinent reports  and I have personally reviewed pertinent films in PACS    Mri Lumbar Spine Wo Contrast    Result Date: 2/11/2019  Impression: 1  Disc bulge with superimposed central/left paracentral disc extrusion at level L5-S1 with 8 mm cephalad migration and abutment of descending S1 nerve roots, left greater than right  It causes mild canal narrowing at this level  2   Partially imaged left ovarian cyst with internal septa versus 2 adjacent cysts  Recommend dedicated pelvic ultrasound for further evaluation  if indicated  The study was marked in EPIC for significant notification  Workstation performed: [id number]     Xr Spine Lumbosacral 1 View    Result Date: 2/13/2019  Impression: Fluoroscopic guidance provided for surgical procedure  Please refer to the separate procedure notes for additional details  Workstation performed: [id number]     Ct Abdomen Pelvis With Contrast    Result Date: 2/10/2019  Impression: Prominent disc bulge at L5-S1 causing severe spinal canal narrowing, correlate with lumbar spine MRI  Increased endometrial fluid and 3 cm left ovarian cyst, correlate with beta-hCG  If positive beta hCG, correlate with pelvic ultrasound  Workstation performed: [id number]     EKG, Pathology, and Other Studies: I have personally reviewed pertinent reports        VTE Pharmacologic Prophylaxis: none currently ordered  Okay to start from neurosurgical standpoint       VTE Mechanical Prophylaxis: sequential compression device

## 2022-04-12 ENCOUNTER — NURSE TRIAGE (OUTPATIENT)
Dept: OTHER | Facility: OTHER | Age: 32
End: 2022-04-12

## 2022-04-12 NOTE — TELEPHONE ENCOUNTER
Regarding: Headches and pain on left eye, neck, and shoulder (Monegasque speaker)  ----- Message from [facility] sent at 4/12/2022  7:57 AM EDT -----  "I am having headaches on the left of side of my head  Pain behind my left eye, neck and shoulder   I've also had a nasal congestion for the past week " (call back Monegasque speaker or have )

## 2022-04-12 NOTE — TELEPHONE ENCOUNTER
Call came in for Neuroscience  Unable to reach patient x 4 attempts  Patient last seen by Neurology 7/1/2020  Reason for Disposition   Third attempt to contact caller AND no contact made  Phone number verified      Protocols used: NO CONTACT OR DUPLICATE CONTACT CALL-ADULT-[place]

## 2022-04-19 ENCOUNTER — APPOINTMENT (EMERGENCY)
Dept: RADIOLOGY | Facility: HOSPITAL | Age: 32
End: 2022-04-19
Payer: COMMERCIAL

## 2022-04-19 ENCOUNTER — HOSPITAL ENCOUNTER (EMERGENCY)
Facility: HOSPITAL | Age: 32
Discharge: HOME | End: 2022-04-19
Attending: EMERGENCY MEDICINE | Admitting: EMERGENCY MEDICINE
Payer: COMMERCIAL

## 2022-04-19 VITALS
RESPIRATION RATE: 18 BRPM | SYSTOLIC BLOOD PRESSURE: 153 MMHG | HEART RATE: 102 BPM | OXYGEN SATURATION: 97 % | TEMPERATURE: 97 F | DIASTOLIC BLOOD PRESSURE: 90 MMHG

## 2022-04-19 DIAGNOSIS — M54.50 LOWER BACK PAIN: ICD-10-CM

## 2022-04-19 DIAGNOSIS — M54.9 BACK PAIN: Primary | ICD-10-CM

## 2022-04-19 LAB
ANION GAP SERPL CALCULATED.3IONS-SCNC: 4 MMOL/L (ref 4–13)
BACTERIA UR QL AUTO: ABNORMAL /HPF
BASOPHILS # BLD AUTO: 0.04 THOUSANDS/ΜL (ref 0–0.1)
BASOPHILS NFR BLD AUTO: 1 % (ref 0–1)
BILIRUB UR QL STRIP: NEGATIVE
BUN SERPL-MCNC: 14 MG/DL (ref 5–25)
CALCIUM SERPL-MCNC: 9.9 MG/DL (ref 8.3–10.1)
CHLORIDE SERPL-SCNC: 105 MMOL/L (ref 100–108)
CLARITY UR: ABNORMAL
CO2 SERPL-SCNC: 29 MMOL/L (ref 21–32)
COLOR UR: YELLOW
CREAT SERPL-MCNC: 0.88 MG/DL (ref 0.6–1.3)
EOSINOPHIL # BLD AUTO: 0.3 THOUSAND/ΜL (ref 0–0.61)
EOSINOPHIL NFR BLD AUTO: 4 % (ref 0–6)
ERYTHROCYTE [DISTWIDTH] IN BLOOD BY AUTOMATED COUNT: 12.1 % (ref 11.6–15.1)
EXT PREG TEST URINE: NEGATIVE
EXT. CONTROL ED NAV: NORMAL
GFR SERPL CREATININE-BSD FRML MDRD: 87 ML/MIN/1.73SQ M
GLUCOSE SERPL-MCNC: 92 MG/DL (ref 65–140)
GLUCOSE UR STRIP-MCNC: NEGATIVE MG/DL
HCT VFR BLD AUTO: 46.9 % (ref 34.8–46.1)
HGB BLD-MCNC: 15.4 G/DL (ref 11.5–15.4)
HGB UR QL STRIP.AUTO: ABNORMAL
IMM GRANULOCYTES # BLD AUTO: 0.05 THOUSAND/UL (ref 0–0.2)
IMM GRANULOCYTES NFR BLD AUTO: 1 % (ref 0–2)
KETONES UR STRIP-MCNC: NEGATIVE MG/DL
LEUKOCYTE ESTERASE UR QL STRIP: NEGATIVE
LYMPHOCYTES # BLD AUTO: 2.22 THOUSANDS/ΜL (ref 0.6–4.47)
LYMPHOCYTES NFR BLD AUTO: 29 % (ref 14–44)
MCH RBC QN AUTO: 31.8 PG (ref 26.8–34.3)
MCHC RBC AUTO-ENTMCNC: 32.8 G/DL (ref 31.4–37.4)
MCV RBC AUTO: 97 FL (ref 82–98)
MONOCYTES # BLD AUTO: 0.65 THOUSAND/ΜL (ref 0.17–1.22)
MONOCYTES NFR BLD AUTO: 9 % (ref 4–12)
MUCOUS THREADS UR QL AUTO: ABNORMAL
NEUTROPHILS # BLD AUTO: 4.31 THOUSANDS/ΜL (ref 1.85–7.62)
NEUTS SEG NFR BLD AUTO: 56 % (ref 43–75)
NITRITE UR QL STRIP: NEGATIVE
NON-SQ EPI CELLS URNS QL MICRO: ABNORMAL /HPF
NRBC BLD AUTO-RTO: 0 /100 WBCS
PH UR STRIP.AUTO: 7 [PH] (ref 4.5–8)
PLATELET # BLD AUTO: 212 THOUSANDS/UL (ref 149–390)
PMV BLD AUTO: 10.1 FL (ref 8.9–12.7)
POTASSIUM SERPL-SCNC: 3.9 MMOL/L (ref 3.5–5.3)
PROT UR STRIP-MCNC: NEGATIVE MG/DL
RBC # BLD AUTO: 4.85 MILLION/UL (ref 3.81–5.12)
RBC #/AREA URNS AUTO: ABNORMAL /HPF
SODIUM SERPL-SCNC: 138 MMOL/L (ref 136–145)
SP GR UR STRIP.AUTO: 1.02 (ref 1–1.03)
UROBILINOGEN UR QL STRIP.AUTO: 0.2 E.U./DL
WBC # BLD AUTO: 7.57 THOUSAND/UL (ref 4.31–10.16)
WBC #/AREA URNS AUTO: ABNORMAL /HPF

## 2022-04-19 PROCEDURE — 99285 EMERGENCY DEPT VISIT HI MDM: CPT | Performed by: PHYSICIAN ASSISTANT

## 2022-04-19 PROCEDURE — 72100 X-RAY EXAM L-S SPINE 2/3 VWS: CPT

## 2022-04-19 PROCEDURE — 81025 URINE PREGNANCY TEST: CPT | Performed by: PHYSICIAN ASSISTANT

## 2022-04-19 PROCEDURE — 80048 BASIC METABOLIC PNL TOTAL CA: CPT | Performed by: PHYSICIAN ASSISTANT

## 2022-04-19 PROCEDURE — G1004 CDSM NDSC: HCPCS

## 2022-04-19 PROCEDURE — A9585 GADOBUTROL INJECTION: HCPCS | Performed by: PHYSICIAN ASSISTANT

## 2022-04-19 PROCEDURE — 85025 COMPLETE CBC W/AUTO DIFF WBC: CPT | Performed by: PHYSICIAN ASSISTANT

## 2022-04-19 PROCEDURE — 99284 EMERGENCY DEPT VISIT MOD MDM: CPT

## 2022-04-19 PROCEDURE — 36415 COLL VENOUS BLD VENIPUNCTURE: CPT | Performed by: PHYSICIAN ASSISTANT

## 2022-04-19 PROCEDURE — 96372 THER/PROPH/DIAG INJ SC/IM: CPT

## 2022-04-19 PROCEDURE — 72158 MRI LUMBAR SPINE W/O & W/DYE: CPT

## 2022-04-19 PROCEDURE — 81001 URINALYSIS AUTO W/SCOPE: CPT

## 2022-04-19 RX ORDER — CYCLOBENZAPRINE HCL 10 MG
10 TABLET ORAL ONCE
Status: COMPLETED | OUTPATIENT
Start: 2022-04-19 | End: 2022-04-19

## 2022-04-19 RX ORDER — LIDOCAINE 50 MG/G
1 PATCH TOPICAL DAILY
Qty: 15 PATCH | Refills: 0 | Status: SHIPPED | OUTPATIENT
Start: 2022-04-19

## 2022-04-19 RX ORDER — LIDOCAINE 50 MG/G
1 PATCH TOPICAL ONCE
Status: DISCONTINUED | OUTPATIENT
Start: 2022-04-19 | End: 2022-04-19 | Stop reason: HOSPADM

## 2022-04-19 RX ORDER — KETOROLAC TROMETHAMINE 30 MG/ML
15 INJECTION, SOLUTION INTRAMUSCULAR; INTRAVENOUS ONCE
Status: COMPLETED | OUTPATIENT
Start: 2022-04-19 | End: 2022-04-19

## 2022-04-19 RX ADMIN — KETOROLAC TROMETHAMINE 15 MG: 30 INJECTION, SOLUTION INTRAMUSCULAR at 10:32

## 2022-04-19 RX ADMIN — LIDOCAINE 5% 1 PATCH: 700 PATCH TOPICAL at 10:32

## 2022-04-19 RX ADMIN — CYCLOBENZAPRINE HYDROCHLORIDE 10 MG: 10 TABLET, FILM COATED ORAL at 10:32

## 2022-04-19 RX ADMIN — GADOBUTROL 8 ML: 604.72 INJECTION INTRAVENOUS at 12:59

## 2022-04-19 NOTE — ED PROVIDER NOTES
History  Chief Complaint   Patient presents with    Back Pain     Patient states that she was helping with her daughter to the car and while reaching she injuried her lower back  Complaining of pain that radiates down her left leg with numbness  Denies any incontinence  Patient presents to the ER for evaluation of back pain  The patient states that yesterday she bent down to get something and felt a pull in her lower back that radiates to her left side and down her left leg  The patient states that since then she has had persistent worsening pain  Notes some mild numbness to her left leg  Patient does note that she has a history of a laminectomy and diskectomy of her L5-S1 that was done in 2019  Patient states that yesterday she took Aleve and Flexeril with minimal relief, denies taking any medication this morning  Patient denies any headache, dizziness weakness, abdominal pain, difficulty with bowel or bladder, or any other concerning symptoms  Prior to Admission Medications   Prescriptions Last Dose Informant Patient Reported? Taking?    DICLOFENAC SODIUM ER PO   Yes No   Sig: Take by mouth   DULoxetine (CYMBALTA) 30 mg delayed release capsule   Yes No   Sig: Take 30 mg by mouth daily   acetaminophen (TYLENOL) 325 mg tablet  Self No No   Sig: Take 3 tablets (975 mg total) by mouth every 8 (eight) hours   Patient not taking: Reported on 7/29/2021   baclofen 10 mg tablet  Self No No   Sig: Take one tablet by mouth every 8 hours for back and leg spasm   Patient not taking: Reported on 2/17/2020   baclofen 20 mg tablet  Self Yes No   Sig: TAKE 1 TABLET BY MOUTH TWICE A DAY AS NEEDED FOR PAIN/SPASM   esomeprazole (NexIUM) 10 MG packet  Self Yes No   Sig: Take by mouth every morning before breakfast   gabapentin (NEURONTIN) 100 mg capsule  Self No No   Sig: Take 1 capsule (100 mg total) by mouth 3 (three) times a day   Patient not taking: Reported on 2/17/2020   gabapentin (NEURONTIN) 300 mg capsule Self Yes No   Sig: TAKE 1 TABLET BY MOUTH IN THE MORNING AND TAKE 2 TABLETS AT BEDTIME   hyoscyamine (ANASPAZ,LEVSIN) 0 125 MG tablet  Self Yes No   Sig: TAKE 1 TABLET BY MOUTH 3 TIMES A DAY AS NEEDED FOR LOOSE BOWELS   ibuprofen (MOTRIN) 600 mg tablet  Self Yes No   Sig: Take by mouth as needed   meloxicam (MOBIC) 15 mg tablet  Self Yes No   Sig: Take 15 mg by mouth daily   omeprazole (PriLOSEC) 20 mg delayed release capsule  Self Yes No   Sig: Take 1 tablet by mouth daily   predniSONE 20 mg tablet   No No   Si tab TID x 2 days, then BID x 2 days, then daily x 2 days      Facility-Administered Medications: None       Past Medical History:   Diagnosis Date    GERD (gastroesophageal reflux disease)     Obesity        Past Surgical History:   Procedure Laterality Date    HERNIA REPAIR      POSTERIOR LAMINECTOMY THORACIC AND LUMBAR SPINE Left 2019    Procedure: LUMBAR LAMINOTOMY/DISCECTOMY L5-S1, LEFT;  Surgeon: Shakira Mast MD;  [facility];  Service: Neurosurgery    NM COLONOSCOPY FLX DX W/COLLJ SPEC WHEN PFRMD N/A 2018    Procedure: COLONOSCOPY;  Surgeon: Maria D Wilson MD;  [facility]; Service: Gastroenterology    NM ESOPHAGOGASTRODUODENOSCOPY TRANSORAL DIAGNOSTIC N/A 2018    Procedure: ESOPHAGOGASTRODUODENOSCOPY (EGD); Surgeon: Maria D Wilson MD;  [facility]; Service: Gastroenterology    TUBAL LIGATION         Family History   Problem Relation Age of Onset    Diabetes Mother     Diabetes Father     Diabetes Sister     Coronary artery disease Paternal Grandmother      I have reviewed and agree with the history as documented      E-Cigarette/Vaping    E-Cigarette Use Never User      E-Cigarette/Vaping Substances     Social History     Tobacco Use    Smoking status: Never Smoker    Smokeless tobacco: Never Used   Vaping Use    Vaping Use: Never used   Substance Use Topics    Alcohol use: No    Drug use: No       Review of Systems   Constitutional: Negative for fever  HENT: Negative for congestion, rhinorrhea and sore throat  Respiratory: Negative for shortness of breath  Cardiovascular: Negative for chest pain  Gastrointestinal: Negative for abdominal pain, nausea and vomiting  Genitourinary: Negative for dysuria  Musculoskeletal: Positive for back pain  Negative for neck pain  Skin: Negative for rash  Neurological: Negative for weakness, numbness and headaches  All other systems reviewed and are negative  Physical Exam  Physical Exam  Constitutional:       Appearance: She is well-developed  HENT:      Head: Normocephalic and atraumatic  Nose: Nose normal    Eyes:      Conjunctiva/sclera: Conjunctivae normal    Cardiovascular:      Rate and Rhythm: Normal rate  Pulmonary:      Effort: Pulmonary effort is normal    Abdominal:      Palpations: Abdomen is soft  Tenderness: There is no abdominal tenderness  Musculoskeletal:         General: Normal range of motion  Cervical back: Normal range of motion  Comments: Positive left straight leg raise  4/5 strength of flexion and extension of left knee and plantar flexion and dorsiflexion of left ankle with moderate molar pain during these movements  5/5 strength of right lower extremity  No footdrop bilaterally  Skin:     General: Skin is warm  Capillary Refill: Capillary refill takes less than 2 seconds  Neurological:      General: No focal deficit present  Mental Status: She is alert and oriented to person, place, and time  Comments: Subjective decreased sensation to left leg compared to right however sensation is intact throughout left lower extremity           Vital Signs  ED Triage Vitals [04/19/22 0928]   Temperature Pulse Respirations Blood Pressure SpO2   (!) 97 °F (36 1 °C) 102 18 153/90 97 %      Temp Source Heart Rate Source Patient Position - Orthostatic VS BP Location FiO2 (%)   Oral Monitor Sitting Right arm --      Pain Score 10 - Worst Possible Pain           Vitals:    04/19/22 0928   BP: 153/90   Pulse: 102   Patient Position - Orthostatic VS: Sitting         Visual Acuity      ED Medications  Medications   lidocaine (LIDODERM) 5 % patch 1 patch (1 patch Topical Patch Removed 4/19/22 2232)   ketorolac (TORADOL) injection 15 mg (15 mg Intramuscular Given 4/19/22 1032)   cyclobenzaprine (FLEXERIL) tablet 10 mg (10 mg Oral Given 4/19/22 1032)   Gadobutrol injection (SINGLE-DOSE) SOLN 8 mL (8 mL Intravenous Given 4/19/22 1259)       Diagnostic Studies  Results Reviewed     Procedure Component Value Units Date/Time    Urine Microscopic [[id number]]  (Abnormal) Collected: 04/19/22 1139    Lab Status: Final result Specimen: Urine, Clean Catch Updated: 04/19/22 1337     RBC, UA 1-2 /hpf      WBC, UA None Seen /hpf      Epithelial Cells Occasional /hpf      Bacteria, UA None Seen /hpf      MUCUS THREADS Occasional    Basic metabolic panel [[id number]] Collected: 04/19/22 1217    Lab Status: Final result Specimen: Blood from Arm, Left Updated: 04/19/22 1252     Sodium 138 mmol/L      Potassium 3 9 mmol/L      Chloride 105 mmol/L      CO2 29 mmol/L      ANION GAP 4 mmol/L      BUN 14 mg/dL      Creatinine 0 88 mg/dL      Glucose 92 mg/dL      Calcium 9 9 mg/dL      eGFR 87 ml/min/1 73sq m     Narrative:      [place] guidelines for Chronic Kidney Disease (CKD):     Stage 1 with normal or high GFR (GFR > 90 mL/min/1 73 square meters)    Stage 2 Mild CKD (GFR = 60-89 mL/min/1 73 square meters)    Stage 3A Moderate CKD (GFR = 45-59 mL/min/1 73 square meters)    Stage 3B Moderate CKD (GFR = 30-44 mL/min/1 73 square meters)    Stage 4 Severe CKD (GFR = 15-29 mL/min/1 73 square meters)    Stage 5 End Stage CKD (GFR <15 mL/min/1 73 square meters)  Note: GFR calculation is accurate only with a steady state creatinine    CBC and differential [[id number]]  (Abnormal) Collected: 04/19/22 1217    Lab Status: Final result Specimen: Blood from Arm, Left Updated: 04/19/22 1246     WBC 7 57 Thousand/uL      RBC 4 85 Million/uL      Hemoglobin 15 4 g/dL      Hematocrit 46 9 %      MCV 97 fL      MCH 31 8 pg      MCHC 32 8 g/dL      RDW 12 1 %      MPV 10 1 fL      Platelets 073 Thousands/uL      nRBC 0 /100 WBCs      Neutrophils Relative 56 %      Immat GRANS % 1 %      Lymphocytes Relative 29 %      Monocytes Relative 9 %      Eosinophils Relative 4 %      Basophils Relative 1 %      Neutrophils Absolute 4 31 Thousands/µL      Immature Grans Absolute 0 05 Thousand/uL      Lymphocytes Absolute 2 22 Thousands/µL      Monocytes Absolute 0 65 Thousand/µL      Eosinophils Absolute 0 30 Thousand/µL      Basophils Absolute 0 04 Thousands/µL     POCT pregnancy, urine [[id number]]  (Normal) Resulted: 04/19/22 1140    Lab Status: Final result Updated: 04/19/22 1156     EXT PREG TEST UR (Ref: Negative) NEGATIVE     Control Valid    Urine Macroscopic, POC [[id number]]  (Abnormal) Collected: 04/19/22 1139    Lab Status: Final result Specimen: Urine Updated: 04/19/22 1141     Color, UA Yellow     Clarity, UA Slightly Cloudy     pH, UA 7 0     Leukocytes, UA Negative     Nitrite, UA Negative     Protein, UA Negative mg/dl      Glucose, UA Negative mg/dl      Ketones, UA Negative mg/dl      Urobilinogen, UA 0 2 E U /dl      Bilirubin, UA Negative     Blood, UA Trace     Specific [place], UA 1 020    Narrative:      CLINITEK RESULT                 MRI lumbar spine w wo contrast   Final Result by Bud Wu MD (04/19 1330)      Lumbar spondylosis is similar to the prior study with scar tissue and a small amount of residual disc herniation at L5-S1  Additionally disc herniation at L4-5 is also similar to the previous study  Workstation performed: [id number]         XR lumbar spine 2 or 3 views   Final Result by Sunita Moreno MD (04/19 6143)      No acute osseous abnormality           Workstation performed: [id number] Procedures  Procedures         ED Course  ED Course as of 04/19/22 1559   Tue Apr 19, 2022   1116 Blood Pressure: 153/90   1116 Temperature(!): 97 °F (36 1 °C)   1116 Pulse: 102   1116 Respirations: 18   1116 SpO2: 97 %   1116 Reviewed with neurosurgery, recommends MRI lumbar with and without   1120 MRI approved by Dr Saray Traylor, radiology   1209 Leukocytes, UA: Negative   1209 Nitrite, UA: Negative   1209 Blood, UA(!): Trace   1209 PREGNANCY TEST URINE: NEGATIVE   1354 MRI lumbar spine w wo contrast  IMPRESSION:     Lumbar spondylosis is similar to the prior study with scar tissue and a small amount of residual disc herniation at L5-S1  Additionally disc herniation at L4-5 is also similar to the previous study  [facility] tigertexted for results   1440 Neurosurgery saw in ER  Cleared for discharge with follow up                               SBIRT 20yo+      Most Recent Value   SBIRT (24 yo +)    In order to provide better care to our patients, we are screening all of our patients for alcohol and drug use  Would it be okay to ask you these screening questions? Unable to answer at this time Filed at: 04/19/2022 1444                    [facility]     Reviewed case with neurosurgery who recommended MRI with and without contrast   MRI showed no acute abnormality that required immediate surgery  Neuro surgery saw the patient in the ED in reviewed MRI results  On neuro surgery re-examination after pain medication, no true weakness noted  Offered patient Medrol Dosepak which she declined as she states steroids cause complications for her  Patient cleared for discharge from Neurosurgery standpoint  Discussed symptomatic treatment patient and close follow-up  Strict return instructions given  Patient in no acute distress throughout ER stay  Vitals stable and reassuring  Patient stable for discharge at this time  Reviewed plan with patient/family  Reviewed red flag symptoms and strict return instructions  Patient/family voiced understanding and agreement to plan  Patient/family had opportunity to ask questions and all questions were answered at bedside  Disposition  Final diagnoses:   Back pain   Lower back pain     Time reflects when diagnosis was documented in both MDM as applicable and the Disposition within this note     Time User Action Codes Description Comment    4/19/2022 11:04 AM Raven Hulbert Add [M54 9] Back pain     4/19/2022  2:45 PM Raven Hulbert Add [M54 50] Lower back pain       ED Disposition     ED Disposition Condition Date/Time Comment    Discharge Stable Tue Apr 19, 2022 Marco A Akira discharge to home/self care              Follow-up Information     Follow up With Specialties Details Why Contact Info Additional [address] [facility] Emergency Medicine  If symptoms worsen [address]  [address] [facility] 64 [facility], [address], [place], [facility] 108    DAMION Lagos Nurse Practitioner In 2 days  [address] [facility] Lisa Lux 34 Neurosurgery Follow up follow up in our office as needed if symptoms do not improve [address] [facility] [address] [facility] [address]  [facility] 9, [address] [place], [place], [address] [phone]          Discharge Medication List as of 4/19/2022  2:48 PM      START taking these medications    Details   lidocaine (LIDODERM) 5 % Apply 1 patch topically daily Remove & Discard patch within 12 hours or as directed by MD, Starting Tue 4/19/2022, Normal         CONTINUE these medications which have NOT CHANGED    Details   acetaminophen (TYLENOL) 325 mg tablet Take 3 tablets (975 mg total) by mouth every 8 (eight) hours, Starting Sat 2/16/2019, No Print !! baclofen 10 mg tablet Take one tablet by mouth every 8 hours for back and leg spasm, Normal      !! baclofen 20 mg tablet TAKE 1 TABLET BY MOUTH TWICE A DAY AS NEEDED FOR PAIN/SPASM, Historical Med      DICLOFENAC SODIUM ER PO Take by mouth, Historical Med      DULoxetine (CYMBALTA) 30 mg delayed release capsule Take 30 mg by mouth daily, Historical Med      esomeprazole (NexIUM) 10 MG packet Take by mouth every morning before breakfast, Historical Med      !! gabapentin (NEURONTIN) 100 mg capsule Take 1 capsule (100 mg total) by mouth 3 (three) times a day, Starting Sat 2/16/2019, No Print      !! gabapentin (NEURONTIN) 300 mg capsule TAKE 1 TABLET BY MOUTH IN THE MORNING AND TAKE 2 TABLETS AT BEDTIME, Historical Med      hyoscyamine (ANASPAZ,LEVSIN) 0 125 MG tablet TAKE 1 TABLET BY MOUTH 3 TIMES A DAY AS NEEDED FOR LOOSE BOWELS, Historical Med      ibuprofen (MOTRIN) 600 mg tablet Take by mouth as needed, Starting Mon 4/4/2016, Historical Med      meloxicam (MOBIC) 15 mg tablet Take 15 mg by mouth daily, Historical Med      omeprazole (PriLOSEC) 20 mg delayed release capsule Take 1 tablet by mouth daily, Starting Tue 2/28/2017, Historical Med      predniSONE 20 mg tablet 1 tab TID x 2 days, then BID x 2 days, then daily x 2 days, Normal       !! - Potential duplicate medications found  Please discuss with provider                PDMP Review     None          ED Provider  Electronically Signed by           Jeramy Llamas PA-C  04/19/22 3561

## 2022-04-19 NOTE — Clinical Note
Nicolas Devang was seen and treated in our emergency department on 4/19/2022  No heavy lifting    Diagnosis:     Abigail Gavin  may return to work on return date  She may return on this date: 04/20/2022         If you have any questions or concerns, please don't hesitate to call        Abdulaziz Marquez PA-C    ______________________________           _______________          _______________  Hospital Representative                              Date                                Time

## 2022-04-19 NOTE — Clinical Note
Sun Pavon accompanied Gely Huynh to the emergency department on 4/19/2022  Return date if applicable: If you have any questions or concerns, please don't hesitate to call        Noel Garber PA-C

## 2022-04-19 NOTE — CONSULTS
[id number] [facility] 1990, 27 y o  female MRN: [id number]  Unit/Bed#: [facility] Encounter: [id number]  Primary Care Provider: DAMION Gonzales   Date and time admitted to hospital: 4/19/2022  9:22 AM    Inpatient consult to Neurosurgery  Consult performed by: Sophia Zaman PA-C  Consult ordered by: Goran Sanchez PA-C      Imaging reviewed 4/19/22 at 11:30am  Patient examined at bedside 4/19/22 at 2:25pm      Back pain of lumbosacral region with radiculopathy  Assessment & Plan  Acute on chronic low back pain with LLE radiculopathy down posterior thigh to knee  · H/o left L5-S1 microdiscectomy in 2019 by Dr Shorty Melendez with little improvement in symptoms  · +numbness, tingling  · +pain limiting weakness but no true weakness on exam  Decreased sensation LLE medial and lateral calf  · No UI/BI/saddle anesthesia    Imaging:  · MRI lumbar spine w/wo, 4/19/22: Lumbar spondylosis is similar to the prior study with scar tissue and a small amount of residual disc herniation at L5-S1  Additionally disc herniation at L4-5 is also similar to the previous study  · Xray lumbar spine, 4/19/22: No acute abnormality appreciated    Plan:  · Reviewed MRI with the patient which shows similar changes as compared to 2019   No acute pathology  · Recommend multi modal pain medication with muscle relaxer, NSAIDs, oral steroids, lidoderm patch  · Patient should follow up with her PCP for referral to pain management for steroid injection and PT if symptoms do not improve with medication  · Call our office or return to ED with any red flag symptoms (weakness, bowel/bladder incontinence or urinary retention, etc)  · Otherwise, patient can follow up in our office on an as needed basis    History of Present Illness     HPI: Donna Glover is a 27y o  year old female with PMH including left L5-S1 microdiscectomy in 2019 by Dr Shorty Melendez with little symptomatic relief who presents with complaint of acute on chronic LBP with LLE radiculopathy  She states she leaned over to pick something up in her car yesterday when she felt a pulling in her left low back  She described significant back pain with pain radiating down her left posterior leg to her knee; she has associated numbness and tingling  She is complaining of subjective leg weakness  She states she is able to ambulate  She denies UI/BI, saddle anesthesia, urinary retention  She admits that the symptoms are not as severe as they were prior to her surgery  After surgery her symptoms have continued intermittently and never fully resolved  Review of Systems   Constitutional: Negative for chills and fever  HENT: Negative for ear pain and sore throat  Eyes: Negative for pain and visual disturbance  Respiratory: Negative for cough and shortness of breath  Cardiovascular: Negative for chest pain and palpitations  Gastrointestinal: Negative for abdominal pain and vomiting  Genitourinary: Negative for dysuria and hematuria  Musculoskeletal: Positive for back pain  Negative for arthralgias  Skin: Negative for color change and rash  Neurological: Positive for weakness and numbness  Negative for seizures and syncope  All other systems reviewed and are negative  Historical Information   Past Medical History:   Diagnosis Date    GERD (gastroesophageal reflux disease)     Obesity      Past Surgical History:   Procedure Laterality Date    HERNIA REPAIR      POSTERIOR LAMINECTOMY THORACIC AND LUMBAR SPINE Left 2/13/2019    Procedure: LUMBAR LAMINOTOMY/DISCECTOMY L5-S1, LEFT;  Surgeon: Tacho Simmons MD;  [facility];  Service: Neurosurgery    MN COLONOSCOPY FLX DX W/COLLJ SPEC WHEN PFRMD N/A 12/20/2018    Procedure: COLONOSCOPY;  Surgeon: Kay Lennox, MD;  [facility];   Service: Gastroenterology    MN ESOPHAGOGASTRODUODENOSCOPY TRANSORAL DIAGNOSTIC N/A 12/20/2018    Procedure: ESOPHAGOGASTRODUODENOSCOPY (EGD); Surgeon: Karen Maharaj MD;  [facility]; Service: Gastroenterology    TUBAL LIGATION       Social History     Substance and Sexual Activity   Alcohol Use No     Social History     Substance and Sexual Activity   Drug Use No     Social History     Tobacco Use   Smoking Status Never Smoker   Smokeless Tobacco Never Used     Family History   Problem Relation Age of Onset    Diabetes Mother     Diabetes Father     Diabetes Sister     Coronary artery disease Paternal Grandmother        Meds/Allergies   all current active meds have been reviewed, current meds:   Current Facility-Administered Medications   Medication Dose Route Frequency    lidocaine (LIDODERM) 5 % patch 1 patch  1 patch Topical Once    and PTA meds:   Prior to Admission Medications   Prescriptions Last Dose Informant Patient Reported? Taking?    DICLOFENAC SODIUM ER PO   Yes No   Sig: Take by mouth   DULoxetine (CYMBALTA) 30 mg delayed release capsule   Yes No   Sig: Take 30 mg by mouth daily   acetaminophen (TYLENOL) 325 mg tablet  Self No No   Sig: Take 3 tablets (975 mg total) by mouth every 8 (eight) hours   Patient not taking: Reported on 7/29/2021   baclofen 10 mg tablet  Self No No   Sig: Take one tablet by mouth every 8 hours for back and leg spasm   Patient not taking: Reported on 2/17/2020   baclofen 20 mg tablet  Self Yes No   Sig: TAKE 1 TABLET BY MOUTH TWICE A DAY AS NEEDED FOR PAIN/SPASM   esomeprazole (NexIUM) 10 MG packet  Self Yes No   Sig: Take by mouth every morning before breakfast   gabapentin (NEURONTIN) 100 mg capsule  Self No No   Sig: Take 1 capsule (100 mg total) by mouth 3 (three) times a day   Patient not taking: Reported on 2/17/2020   gabapentin (NEURONTIN) 300 mg capsule  Self Yes No   Sig: TAKE 1 TABLET BY MOUTH IN THE MORNING AND TAKE 2 TABLETS AT BEDTIME   hyoscyamine (ANASPAZ,LEVSIN) 0 125 MG tablet  Self Yes No   Sig: TAKE 1 TABLET BY MOUTH 3 TIMES A DAY AS NEEDED FOR LOOSE BOWELS   ibuprofen (MOTRIN) 600 mg tablet  Self Yes No   Sig: Take by mouth as needed   meloxicam (MOBIC) 15 mg tablet  Self Yes No   Sig: Take 15 mg by mouth daily   omeprazole (PriLOSEC) 20 mg delayed release capsule  Self Yes No   Sig: Take 1 tablet by mouth daily   predniSONE 20 mg tablet   No No   Si tab TID x 2 days, then BID x 2 days, then daily x 2 days      Facility-Administered Medications: None     Allergies   Allergen Reactions    Septra [Sulfamethoxazole-Trimethoprim] Rash and Shortness Of Breath    Ciprofloxacin Hcl     Penicillins Rash    Zosyn [Piperacillin Sod-Tazobactam So] Hives and Rash       Objective   I/O     None          Physical Exam  Vitals and nursing note reviewed  Constitutional:       Appearance: She is well-developed  HENT:      Head: Normocephalic and atraumatic  Eyes:      Extraocular Movements: Extraocular movements intact  Pupils: Pupils are equal, round, and reactive to light  Cardiovascular:      Rate and Rhythm: Normal rate  Pulmonary:      Effort: Pulmonary effort is normal  No respiratory distress  Abdominal:      Palpations: Abdomen is soft  Musculoskeletal:         General: Normal range of motion  Cervical back: Normal range of motion  Skin:     General: Skin is warm and dry  Neurological:      Mental Status: She is alert and oriented to person, place, and time  Deep Tendon Reflexes:      Reflex Scores:       Patellar reflexes are 2+ on the right side and 2+ on the left side  Achilles reflexes are 2+ on the right side and 2+ on the left side  Psychiatric:         Mood and Affect: Mood normal          Speech: Speech normal          Behavior: Behavior normal          Thought Content: Thought content normal          Judgment: Judgment normal        Neurologic Exam     Mental Status   Oriented to person, place, and time  Follows 2 step commands     Attention: normal  Concentration: normal    Speech: speech is normal   Level of consciousness: alert  Knowledge: good  Able to repeat  Normal comprehension  Thai speaking     Cranial Nerves   Cranial nerves II through XII intact  CN III, IV, VI   Pupils are equal, round, and reactive to light  Motor Exam   Muscle bulk: normal  Overall muscle tone: normal    Strength   Strength 5/5 except as noted  4+/5 left HF weakness, pain limiting     Sensory Exam   Decreased sensation to LT/PP medial and lateral calf left lower extremity     Gait, Coordination, and Reflexes     Tremor   Resting tremor: absent    Reflexes   Right patellar: 2+  Left patellar: 2+  Right achilles: 2+  Left achilles: 2+  Right ankle clonus: absent  Left ankle clonus: absent        Vitals:Blood pressure 153/90, pulse 102, temperature (!) 97 °F (36 1 °C), temperature source Oral, resp  rate 18, SpO2 97 %  ,There is no height or weight on file to calculate BMI  Lab Results:   Results from last 7 days   Lab Units 04/19/22  1217   WBC Thousand/uL 7 57   HEMOGLOBIN g/dL 15 4   HEMATOCRIT % 46 9*   PLATELETS Thousands/uL 212   NEUTROS PCT % 56   MONOS PCT % 9     Results from last 7 days   Lab Units 04/19/22  1217   POTASSIUM mmol/L 3 9   CHLORIDE mmol/L 105   CO2 mmol/L 29   BUN mg/dL 14   CREATININE mg/dL 0 88   CALCIUM mg/dL 9 9                 No results found for: TROPONINT  ABG:No results found for: PHART, OLQ8PDM, PO2ART, QWR2JDE, A8XAHWQU, BEART, SOURCE    Imaging Studies: I have personally reviewed pertinent reports  and I have personally reviewed pertinent films in PACS     MRI lumbar spine w wo contrast    Result Date: 4/19/2022  Narrative: MRI LUMBAR SPINE WITH AND WITHOUT CONTRAST INDICATION: Lumbar back pain with radiculopathy  COMPARISON:  6/9/2019 TECHNIQUE:  Sagittal T1, sagittal T2, sagittal inversion recovery, axial T1 and axial T2, coronal T2    Sagittal and axial T1 postcontrast  IV Contrast:  8 mL of Gadobutrol injection (SINGLE-DOSE) IMAGE QUALITY:  Diagnostic FINDINGS: VERTEBRAL BODIES: There are 5 lumbar type vertebral bodies  Normal alignment of the lumbar spine  No spondylolysis or spondylolisthesis  No scoliosis  No compression fracture  Type II Modic endplate changes associated with the inferior endplate of  L5 with a chronic appearing Schmorl's node, similar to the prior MRI  No bone marrow edema or focally suspicious marrow lesions  No compression abnormality  Mild spondylotic changes elsewhere  SACRUM:  Normal signal within the sacrum  No evidence of insufficiency or stress fracture  DISTAL CORD AND CONUS:  Normal size and signal within the distal cord and conus  The conus medullaris terminates at the T12-L1 intervertebral disc level  Unk Dulce Maria PARASPINAL SOFT TISSUES:  Paraspinal soft tissues are unremarkable  LOWER THORACIC DISC SPACES:  Normal disc height and signal   No disc herniation, canal stenosis or foraminal narrowing  LUMBAR DISC SPACES: L1-L2:  Normal  L2-L3:  Normal  L3-L4:  Normal  L4-L5:  There is a small focus of enhancement in the posterior annulus with a subtle focus of T2 hyperintensity, indicative of a high intensity zone, image 19, series 6 correlating finding on image 8, series 9  There is a diffuse disk bulge  No significant central canal or neural foraminal narrowing  Bilateral facet hypertrophy noted  This level is similar to the prior study  L5-S1:  There is a left paracentral disc herniation, protrusion type  Evidence of prior left hemilaminotomy at this level, similar to the prior study  There is a small amount of associated scar tissue/enhancement admixed with nonenhancing recurrent/residual disc at this level  Moderate narrowing left subarticular recess  Moderate left neural foraminal narrowing  Central canal patent  Right neural foramen patent  This level is similar to the prior study   POSTCONTRAST IMAGING:  Small amount of residual enhancement associated with the posterior margin of the left L5 intervertebral disc in the left subarticular recess likely a combination of scar tissue and/or small amount of residual disc, stable from the prior study  Impression: Lumbar spondylosis is similar to the prior study with scar tissue and a small amount of residual disc herniation at L5-S1  Additionally disc herniation at L4-5 is also similar to the previous study  Workstation performed: [id number]     EKG, Pathology, and Other Studies: I have personally reviewed pertinent reports  VTE Prophylaxis: Sequential compression device Ragena Marker)     Code Status: Prior  Advance Directive and Living Will:      Power of :    POLST:      Counseling / Coordination of Care  I spent 30 minutes with the patient

## 2022-04-19 NOTE — ASSESSMENT & PLAN NOTE
Acute on chronic low back pain with LLE radiculopathy down posterior thigh to knee  · H/o left L5-S1 microdiscectomy in 2019 by Dr Alycia Bhandari with little improvement in symptoms  · +numbness, tingling  · +pain limiting weakness but no true weakness on exam  Decreased sensation LLE medial and lateral calf  · No UI/BI/saddle anesthesia    Imaging:  · MRI lumbar spine w/wo, 4/19/22: Lumbar spondylosis is similar to the prior study with scar tissue and a small amount of residual disc herniation at L5-S1  Additionally disc herniation at L4-5 is also similar to the previous study  · Xray lumbar spine, 4/19/22: No acute abnormality appreciated    Plan:  · Reviewed MRI with the patient which shows similar changes as compared to 2019   No acute pathology  · Recommend multi modal pain medication with muscle relaxer, NSAIDs, oral steroids, lidoderm patch  · Patient should follow up with her PCP for referral to pain management for steroid injection and PT if symptoms do not improve with medication  · Call our office or return to ED with any red flag symptoms (weakness, bowel/bladder incontinence or urinary retention, etc)  · Otherwise, patient can follow up in our office on an as needed basis

## 2022-04-19 NOTE — DISCHARGE INSTRUCTIONS
Return to the ER with any new or worsening of symptoms such as but not limited to increased pain, numbness, weakness, abdominal pain, fevers, or any other concerning symptoms    Thank you for allowing us to be part of your care today

## 2022-04-20 ENCOUNTER — TELEPHONE (OUTPATIENT)
Dept: PHYSICAL THERAPY | Facility: OTHER | Age: 32
End: 2022-04-20

## 2022-04-26 ENCOUNTER — TELEPHONE (OUTPATIENT)
Dept: PHYSICAL THERAPY | Facility: OTHER | Age: 32
End: 2022-04-26

## 2022-07-12 ENCOUNTER — OFFICE VISIT (OUTPATIENT)
Dept: DENTISTRY | Facility: CLINIC | Age: 32
End: 2022-07-12

## 2022-07-12 VITALS — DIASTOLIC BLOOD PRESSURE: 92 MMHG | HEART RATE: 105 BPM | TEMPERATURE: 98.3 F | SYSTOLIC BLOOD PRESSURE: 130 MMHG

## 2022-07-12 DIAGNOSIS — Z00.00 ENCOUNTER FOR SCREENING AND PREVENTATIVE CARE: Primary | ICD-10-CM

## 2022-07-12 PROBLEM — R10.13 DYSPEPSIA: Status: ACTIVE | Noted: 2017-02-28

## 2022-07-12 PROBLEM — Z13.220 SCREENING FOR HYPERLIPIDEMIA: Status: ACTIVE | Noted: 2022-07-12

## 2022-07-12 PROBLEM — M79.7 FIBROMYALGIA: Status: ACTIVE | Noted: 2020-08-20

## 2022-07-12 PROBLEM — G43.009 MIGRAINE WITHOUT AURA: Status: ACTIVE | Noted: 2020-08-20

## 2022-07-12 PROBLEM — K58.9 IRRITABLE BOWEL SYNDROME: Status: ACTIVE | Noted: 2020-08-20

## 2022-07-12 PROBLEM — B00.9 HERPES SIMPLEX TYPE 1 ANTIBODY POSITIVE: Status: ACTIVE | Noted: 2020-09-24

## 2022-07-12 PROBLEM — F32.A DEPRESSION: Status: ACTIVE | Noted: 2022-07-12

## 2022-07-12 PROCEDURE — D0210 INTRAORAL - COMPLETE SERIES OF RADIOGRAPHIC IMAGES: HCPCS

## 2022-07-12 PROCEDURE — D0150 COMPREHENSIVE ORAL EVALUATION - NEW OR ESTABLISHED PATIENT: HCPCS

## 2022-07-12 RX ORDER — NAPROXEN 500 MG/1
500 TABLET ORAL 2 TIMES DAILY WITH MEALS
COMMUNITY

## 2022-07-12 NOTE — PROGRESS NOTES
ASA II  CC: sensitive teeth, slt pain UR arrived as NP (hasn't been in to see us since 2017 but had gone to [facility] two yrs ago  Checked insurance to see last FMX-was sone 2017)    Comp exam, PC, Fmx  Perio local  Poster  4-6mm, lt local  Recession  DR Ferrell Neigh: subging  Calc   Watch #15L  #2 badly decayed  Explained to pt need for deep cleaning-she understands    NV 75 mins SRP UR,LR  Srp UL,LL  #2 sending preauth Rct, crown-try to save   Rest #3,13,18,28,29,30  Crown #12

## 2022-08-04 ENCOUNTER — OFFICE VISIT (OUTPATIENT)
Dept: DENTISTRY | Facility: CLINIC | Age: 32
End: 2022-08-04

## 2022-08-04 DIAGNOSIS — K04.7 DENTAL INFECTION: Primary | ICD-10-CM

## 2022-08-04 PROBLEM — M25.562 CHRONIC PAIN OF LEFT KNEE: Status: ACTIVE | Noted: 2021-03-15

## 2022-08-04 PROBLEM — M79.641 PAIN OF RIGHT HAND: Status: ACTIVE | Noted: 2020-08-20

## 2022-08-04 PROBLEM — N64.3 GALACTORRHEA NOT ASSOCIATED WITH CHILDBIRTH: Status: ACTIVE | Noted: 2022-08-04

## 2022-08-04 PROBLEM — H10.10 ALLERGIC CONJUNCTIVITIS: Status: ACTIVE | Noted: 2022-08-04

## 2022-08-04 PROBLEM — Z87.42 HISTORY OF OVARIAN CYST: Status: ACTIVE | Noted: 2019-07-05

## 2022-08-04 PROBLEM — R87.610 ASCUS OF CERVIX WITH NEGATIVE HIGH RISK HPV: Status: ACTIVE | Noted: 2017-09-06

## 2022-08-04 PROBLEM — K52.9 GASTROENTERITIS: Status: ACTIVE | Noted: 2022-08-04

## 2022-08-04 PROBLEM — Z23 NEED FOR IMMUNIZATION AGAINST INFLUENZA: Status: ACTIVE | Noted: 2022-08-04

## 2022-08-04 PROBLEM — G89.29 CHRONIC PAIN OF LEFT KNEE: Status: ACTIVE | Noted: 2021-03-15

## 2022-08-04 PROBLEM — J01.00 ACUTE ANTRITIS: Status: ACTIVE | Noted: 2022-08-04

## 2022-08-04 PROBLEM — J06.9 ACUTE UPPER RESPIRATORY INFECTION: Status: ACTIVE | Noted: 2022-08-04

## 2022-08-04 PROBLEM — R87.619 ABNORMAL CERVICAL PAPANICOLAOU SMEAR: Status: ACTIVE | Noted: 2022-08-04

## 2022-08-04 PROBLEM — L23.7 CONTACT DERMATITIS DUE TO POISON IVY: Status: ACTIVE | Noted: 2022-08-04

## 2022-08-04 PROBLEM — L50.0 ALLERGIC URTICARIA: Status: ACTIVE | Noted: 2022-08-04

## 2022-08-04 PROBLEM — K76.9 CHRONIC NONALCOHOLIC LIVER DISEASE: Status: ACTIVE | Noted: 2019-01-25

## 2022-08-04 PROBLEM — R21 RASH: Status: ACTIVE | Noted: 2021-03-15

## 2022-08-04 PROBLEM — Z23 NEED FOR TDAP VACCINATION: Status: ACTIVE | Noted: 2022-08-04

## 2022-08-04 PROBLEM — E66.9 OBESITY (BMI 35.0-39.9 WITHOUT COMORBIDITY): Status: ACTIVE | Noted: 2022-03-09

## 2022-08-04 PROBLEM — R10.2 PELVIC PAIN IN FEMALE: Status: ACTIVE | Noted: 2019-07-05

## 2022-08-04 PROBLEM — M54.12 CERVICAL RADICULAR PAIN: Status: ACTIVE | Noted: 2020-08-20

## 2022-08-04 PROBLEM — E55.9 VITAMIN D DEFICIENCY: Status: ACTIVE | Noted: 2020-08-20

## 2022-08-04 PROCEDURE — D0270 BITEWING - SINGLE RADIOGRAPHIC IMAGE: HCPCS

## 2022-08-04 PROCEDURE — D0140 LIMITED ORAL EVALUATION - PROBLEM FOCUSED: HCPCS

## 2022-08-04 PROCEDURE — D0220 INTRAORAL - PERIAPICAL FIRST RADIOGRAPHIC IMAGE: HCPCS

## 2022-08-04 RX ORDER — CLINDAMYCIN HYDROCHLORIDE 300 MG/1
300 CAPSULE ORAL 3 TIMES DAILY
Qty: 21 CAPSULE | Refills: 0 | Status: SHIPPED | OUTPATIENT
Start: 2022-08-04 | End: 2022-08-11

## 2022-08-04 NOTE — PROGRESS NOTES
Patient presents for limited exam    CC: "I have pain for two weeks on my upper right molar " Pt is pointing to #2  HHX reviewed    Radiographs taken: 1 bw and 1 PA #2 which reveals large decay  The pt was previously tx planned for rct/crown but now would like the tooth removed  Dr Farideh Vazquez did exam-  Findings/recommendations:  Recommend returning for extraction #2  Pt is slightly nervous, pt is aware we do not do general sedation for extractions  RX antibiotics- pt has penicillin allergy  Pt was prescribed Clindamycin for 7 days while she awaits appt for extraction  Pharmacy: [facility] in [address] [place] [address] dismissed in good health, no complications and all questions answered  NV: EXT #2 with resident

## 2022-09-02 ENCOUNTER — OFFICE VISIT (OUTPATIENT)
Dept: BARIATRICS | Facility: CLINIC | Age: 32
End: 2022-09-02

## 2022-09-02 VITALS — WEIGHT: 190 LBS | BODY MASS INDEX: 38.3 KG/M2 | HEIGHT: 59 IN

## 2022-09-02 DIAGNOSIS — R63.5 ABNORMAL WEIGHT GAIN: ICD-10-CM

## 2022-09-02 PROCEDURE — WMDI30: Performed by: DIETITIAN, REGISTERED

## 2022-09-02 PROCEDURE — RECHECK: Performed by: DIETITIAN, REGISTERED

## 2022-09-02 NOTE — PROGRESS NOTES
Weight Management Medical Nutrition Assessment  Gloria Leary presented for a meal planning session  Today's weight is 190#  Per dietary recall patient consumes excess calories from larger portions at mid day meal and sugary beverages  She is not formally food logging  She is going to the gym daily  Developed and reviewed a low calorie meal plan    Patient seen by Medical Provider in past 6 months:  no  Requested to schedule appointment with Medical Provider: No      Anthropometric Measurements  Start Weight (#): 190#  Current Weight (#): n/a  TBW % Change from start weight:n/a  Ideal Body Weight (#):95#  Goal Weight (#):ST# LT#    Weight Loss History  Previous weight loss attempts: Self Created Diets (Portion Control, Healthy Food Choices, etc )    Food and Nutrition Related History    Food Recall  Work 8am- 1:00pm  Breakfast:2 eggs with 2 toast   1 cup oatmeal 1/2 cup milk 1 banana 1 spoon sugar / Honey    Snack:skip or crackers or yogurt   2:00pm-3:00pm Lunch:5 oz protein/ 1 cup rice/ 12/ cup beans / carrots or corn   Sweet   Snack:skip   Dinner:1 cup milk with cereal- unmeasured   Snack:skip      Beverages: water/ soda  / coffee milk( 1 sugar )  Volume of beverage intake: 6oz    Weekends: Same  Cravings: salty   Trouble area of day:mid day meal     Frequency of Eating out: irregularly  Food restrictions:lactose free milk   Cooking: self   Food Shopping: self    Physical Activity Intake  Activity:Walking  (30min)  Frequency:daily  Physical limitations/barriers to exercise: back surgery     Estimated Needs  Energy  [employer] Needs: BMR : 1465 calories    1-2# loss weekly sedentary:  757-1257 calories             1-2# loss weekly lightly active:9377-7941 calories  Maintenance calories for sedentary activity level: 1757 calories  Protein:52-65gm      (1 2-1 5g/kg IBW)  Fluid: 50oz     (35mL/kg IBW)    Nutrition Diagnosis  Yes;     Overweight/obesity  related to Excess energy intake as evidenced by BMI more than normative standard for age and sex (obesity-grade II 32-38  9)       Nutrition Intervention    Nutrition Prescription  Calories:1200 calories on sedentary days and flex to 1400 calories on cardio days  Protein:60-65gm  Fluid:50oz    Meal Plan (Mohit/Pro/Carb)  Breakfast: 200-300/20/30  Snack:  Lunch: 300/30/30-45  Snack: 150/>5/20  Dinner: 300/30/30-45  Snack: 150/>5/20    Nutrition Education:    Calorie controlled menu  Lean protein food choices  Healthy snack options  Food journaling tips      Nutrition Counseling:  Strategies: meal planning, portion sizes, healthy snack choices, hydration, fiber intake, protein intake, exercise, food journal      Monitoring and Evaluation:  Evaluation criteria:  Energy Intake  Meet protein needs  Maintain adequate hydration  Monitor weekly weight  Meal planning/preparation  Food journal   Decreased portions at mealtimes and snacks  Physical activity     Barriers to learning:none  Readiness to change: Preparation:  (Getting ready to change)   Comprehension: good  Expected Compliance: good

## 2022-10-11 PROBLEM — J01.00 ACUTE ANTRITIS: Status: RESOLVED | Noted: 2022-08-04 | Resolved: 2022-10-11

## 2022-10-11 PROBLEM — Z13.220 SCREENING FOR HYPERLIPIDEMIA: Status: RESOLVED | Noted: 2022-07-12 | Resolved: 2022-10-11

## 2022-10-11 PROBLEM — H10.10 ALLERGIC CONJUNCTIVITIS: Status: RESOLVED | Noted: 2022-08-04 | Resolved: 2022-10-11

## 2022-10-11 PROBLEM — J06.9 ACUTE UPPER RESPIRATORY INFECTION: Status: RESOLVED | Noted: 2022-08-04 | Resolved: 2022-10-11

## 2022-10-11 PROBLEM — K52.9 GASTROENTERITIS: Status: RESOLVED | Noted: 2022-08-04 | Resolved: 2022-10-11

## 2022-12-03 ENCOUNTER — HOSPITAL ENCOUNTER (EMERGENCY)
Facility: HOSPITAL | Age: 32
Discharge: HOME | End: 2022-12-03
Attending: EMERGENCY MEDICINE

## 2022-12-03 VITALS
TEMPERATURE: 97.8 F | DIASTOLIC BLOOD PRESSURE: 100 MMHG | RESPIRATION RATE: 16 BRPM | OXYGEN SATURATION: 100 % | WEIGHT: 185 LBS | BODY MASS INDEX: 38.01 KG/M2 | HEART RATE: 80 BPM | SYSTOLIC BLOOD PRESSURE: 155 MMHG

## 2022-12-03 DIAGNOSIS — K08.89 PAIN, DENTAL: Primary | ICD-10-CM

## 2022-12-03 RX ORDER — CLINDAMYCIN HYDROCHLORIDE 300 MG/1
300 CAPSULE ORAL 4 TIMES DAILY
Qty: 28 CAPSULE | Refills: 0 | Status: SHIPPED | OUTPATIENT
Start: 2022-12-03 | End: 2022-12-10

## 2022-12-03 RX ORDER — NAPROXEN 500 MG/1
500 TABLET ORAL 2 TIMES DAILY WITH MEALS
Qty: 30 TABLET | Refills: 0 | Status: SHIPPED | OUTPATIENT
Start: 2022-12-03

## 2022-12-03 RX ORDER — ROPIVACAINE HYDROCHLORIDE 5 MG/ML
15 INJECTION, SOLUTION EPIDURAL; INFILTRATION; PERINEURAL ONCE
Status: COMPLETED | OUTPATIENT
Start: 2022-12-03 | End: 2022-12-03

## 2022-12-03 RX ORDER — CLINDAMYCIN HYDROCHLORIDE 150 MG/1
450 CAPSULE ORAL ONCE
Status: COMPLETED | OUTPATIENT
Start: 2022-12-03 | End: 2022-12-03

## 2022-12-03 RX ORDER — CHLORHEXIDINE GLUCONATE 0.12 MG/ML
15 RINSE ORAL EVERY 12 HOURS SCHEDULED
Status: DISCONTINUED | OUTPATIENT
Start: 2022-12-03 | End: 2022-12-03 | Stop reason: HOSPADM

## 2022-12-03 RX ORDER — KETOROLAC TROMETHAMINE 30 MG/ML
15 INJECTION, SOLUTION INTRAMUSCULAR; INTRAVENOUS ONCE
Status: COMPLETED | OUTPATIENT
Start: 2022-12-03 | End: 2022-12-03

## 2022-12-03 RX ADMIN — ROPIVACAINE HYDROCHLORIDE 15 ML: 5 INJECTION, SOLUTION EPIDURAL; INFILTRATION; PERINEURAL at 20:06

## 2022-12-03 RX ADMIN — CLINDAMYCIN HYDROCHLORIDE 450 MG: 150 CAPSULE ORAL at 20:14

## 2022-12-03 RX ADMIN — TOPICAL ANESTHETIC 2 SPRAY: 200 SPRAY DENTAL; PERIODONTAL at 20:05

## 2022-12-03 RX ADMIN — KETOROLAC TROMETHAMINE 15 MG: 30 INJECTION, SOLUTION INTRAMUSCULAR; INTRAVENOUS at 20:06

## 2022-12-03 RX ADMIN — CHLORHEXIDINE GLUCONATE 15 ML: 1.2 SOLUTION ORAL at 20:06

## 2022-12-04 NOTE — DISCHARGE INSTRUCTIONS
Please follow up with your dental clinic  Take motrin or naproxen to help with pain  Antibiotic course for 7 days  If you develop new or worsening symptoms, please return to the Emergency Department for further evaluation

## 2022-12-04 NOTE — ED PROVIDER NOTES
History  Chief Complaint   Patient presents with   • Dental Pain     Pt c/o pain in upper right molar  Extraction scheduled for February but pt has ear pain and headache  HPI    Patient is a 34year old female with PMH fibromyalgia presenting with dental pain  Patient has had pain since August, evaluated and recommended for extraction of tooth #2, pt states extraction scheduled in February  Pain worsened since last night, hurts to chew, pain radiates up towards the ear  Denies fever, chills, difficulty breathing, difficulty swallowing, sore throat, chest pain, shortness of breath  Prior to Admission Medications   Prescriptions Last Dose Informant Patient Reported? Taking?    DICLOFENAC SODIUM ER PO   Yes No   Sig: Take by mouth   Patient not taking: Reported on 7/12/2022   DULoxetine (CYMBALTA) 30 mg delayed release capsule   Yes No   Sig: Take 30 mg by mouth daily   Patient not taking: Reported on 7/12/2022   acetaminophen (TYLENOL) 325 mg tablet   No No   Sig: Take 3 tablets (975 mg total) by mouth every 8 (eight) hours   Patient taking differently: Take 975 mg by mouth every 8 (eight) hours Takes sometimes   baclofen 10 mg tablet   No No   Sig: Take one tablet by mouth every 8 hours for back and leg spasm   Patient not taking: Reported on 2/17/2020   baclofen 20 mg tablet   Yes No   Sig: TAKE 1 TABLET BY MOUTH TWICE A DAY AS NEEDED FOR PAIN/SPASM   Patient not taking: Reported on 7/12/2022   esomeprazole (NexIUM) 10 MG packet   Yes No   Sig: Take by mouth every morning before breakfast   gabapentin (NEURONTIN) 100 mg capsule   No No   Sig: Take 1 capsule (100 mg total) by mouth 3 (three) times a day   Patient taking differently: Take 100 mg by mouth 3 (three) times a day Rarely takes   gabapentin (NEURONTIN) 300 mg capsule   Yes No   Sig: TAKE 1 TABLET BY MOUTH IN THE MORNING AND TAKE 2 TABLETS AT BEDTIME   hyoscyamine (ANASPAZ,LEVSIN) 0 125 MG tablet   Yes No   Sig: TAKE 1 TABLET BY MOUTH 3 TIMES A DAY AS NEEDED FOR LOOSE BOWELS   Patient not taking: Reported on 2022   ibuprofen (MOTRIN) 600 mg tablet   Yes No   Sig: Take by mouth as needed   lidocaine (LIDODERM) 5 %   No No   Sig: Apply 1 patch topically daily Remove & Discard patch within 12 hours or as directed by MD   meloxicam (MOBIC) 15 mg tablet   Yes No   Sig: Take 15 mg by mouth daily   Patient not taking: Reported on 2022   naproxen (NAPROSYN) 500 mg tablet   Yes No   Sig: Take 500 mg by mouth 2 (two) times a day with meals Pt unsure dosage but is currently taking   omeprazole (PriLOSEC) 20 mg delayed release capsule   Yes No   Sig: Take 1 tablet by mouth daily   predniSONE 20 mg tablet   No No   Si tab TID x 2 days, then BID x 2 days, then daily x 2 days   Patient not taking: Reported on 2022      Facility-Administered Medications: None       Past Medical History:   Diagnosis Date   • Fibromyalgia    • GERD (gastroesophageal reflux disease)    • Obesity        Past Surgical History:   Procedure Laterality Date   • HERNIA REPAIR     • POSTERIOR LAMINECTOMY THORACIC AND LUMBAR SPINE Left 2019    Procedure: LUMBAR LAMINOTOMY/DISCECTOMY L5-S1, LEFT;  Surgeon: Earline Buenrostro MD;  Location: [facility];  Service: Neurosurgery   • CT COLONOSCOPY FLX DX W/COLLJ SPEC WHEN PFRMD N/A 2018    Procedure: COLONOSCOPY;  Surgeon: Donna Gomez MD;  Location: [facility]; Service: Gastroenterology   • CT ESOPHAGOGASTRODUODENOSCOPY TRANSORAL DIAGNOSTIC N/A 2018    Procedure: ESOPHAGOGASTRODUODENOSCOPY (EGD); Surgeon: Donna Gomez MD;  Location: [facility]; Service: Gastroenterology   • TUBAL LIGATION         Family History   Problem Relation Age of Onset   • Diabetes Mother    • Diabetes Father    • Diabetes Sister    • Coronary artery disease Paternal Grandmother      I have reviewed and agree with the history as documented      E-Cigarette/Vaping   • E-Cigarette Use Never User      E-Cigarette/Vaping Substances     Social History Tobacco Use   • Smoking status: Never   • Smokeless tobacco: Never   Vaping Use   • Vaping Use: Never used   Substance Use Topics   • Alcohol use: No   • Drug use: No        Review of Systems   Constitutional: Negative for chills and fever  HENT: Positive for dental problem  Negative for ear pain and sore throat  Eyes: Negative for pain and visual disturbance  Respiratory: Negative for cough and shortness of breath  Cardiovascular: Negative for chest pain and palpitations  Gastrointestinal: Negative for abdominal pain and vomiting  Genitourinary: Negative for dysuria and hematuria  Musculoskeletal: Negative for arthralgias and back pain  Skin: Negative for color change and rash  Neurological: Negative for seizures and syncope  All other systems reviewed and are negative  Physical Exam  ED Triage Vitals   Temperature Pulse Respirations Blood Pressure SpO2   12/03/22 1908 12/03/22 1907 12/03/22 1907 12/03/22 1907 12/03/22 1907   97 8 °F (36 6 °C) 80 16 155/100 100 %      Temp src Heart Rate Source Patient Position - Orthostatic VS BP Location FiO2 (%)   -- -- -- -- --             Pain Score       --                    Orthostatic Vital Signs  Vitals:    12/03/22 1907   BP: 155/100   Pulse: 80       Physical Exam  Vitals and nursing note reviewed  Constitutional:       General: She is not in acute distress  HENT:      Head: Normocephalic and atraumatic  Right Ear: External ear normal       Left Ear: External ear normal       Nose: Nose normal       Mouth/Throat:      Pharynx: Oropharynx is clear  Comments: Decay of tooth #2 without evidence of abscess  No evidence of deep space infection  Eyes:      Extraocular Movements: Extraocular movements intact  Pupils: Pupils are equal, round, and reactive to light  Cardiovascular:      Rate and Rhythm: Normal rate and regular rhythm  Pulses: Normal pulses  Heart sounds: Normal heart sounds  No murmur heard      No friction rub  No gallop  Pulmonary:      Effort: Pulmonary effort is normal  No respiratory distress  Breath sounds: Normal breath sounds  No wheezing, rhonchi or rales  Abdominal:      General: Abdomen is flat  There is no distension  Palpations: Abdomen is soft  Tenderness: There is no abdominal tenderness  There is no guarding or rebound  Musculoskeletal:         General: No deformity  Normal range of motion  Cervical back: Normal range of motion  Right lower leg: No edema  Left lower leg: No edema  Skin:     General: Skin is warm and dry  Capillary Refill: Capillary refill takes less than 2 seconds  Findings: No rash  Neurological:      General: No focal deficit present  Mental Status: She is alert and oriented to person, place, and time  Gait: Gait normal    Psychiatric:         Mood and Affect: Mood normal          ED Medications  Medications   chlorhexidine (PERIDEX) 0 12 % oral rinse 15 mL (15 mL Swish & Spit Given 12/3/22 2006)   ketorolac (TORADOL) injection 15 mg (15 mg Intramuscular Given 12/3/22 2006)   clindamycin (CLEOCIN) capsule 450 mg (450 mg Oral Given 12/3/22 2014)   ropivacaine (NAROPIN) 0 5 % injection 15 mL (15 mL Infiltration Given by Other 12/3/22 2006)   benzocaine (HURRICAINE) 20 % mucosal spray 2 spray (2 sprays Mucosal Given 12/3/22 2005)       Diagnostic Studies  Results Reviewed     None                 No orders to display         Procedures  Nerve block    Date/Time: 12/3/2022 8:22 PM  Performed by: Breann Brown MD  Authorized by: Breann Brown MD   Universal Protocol:  Consent: Verbal consent obtained  Risks and benefits: risks, benefits and alternatives were discussed  Consent given by: patient  Time out: Immediately prior to procedure a "time out" was called to verify the correct patient, procedure, equipment, support staff and site/side marked as required    Timeout called at: 12/3/2022 8:22 PM   Patient identity confirmed: verbally with patient and arm band      Indications:     Indications:  Pain relief  Location:     Body area:  Head    Head nerve blocked: superior alveolar  Laterality:  Right  Pre-procedure details:     Skin preparation:  2% chlorhexidine  Skin anesthesia (see MAR for exact dosages):     Skin anesthesia method:  Topical application    Topical anesthetic:  Benzocaine gel  Procedure details (see MAR for exact dosages): Block needle gauge:  25 G    Block anesthetic: ropivicaine 0 5%    Injection procedure:  Anatomic landmarks identified, anatomic landmarks palpated, introduced needle, incremental injection and negative aspiration for blood  Post-procedure details:     Dressing:  None    Outcome:  Pain relieved    Patient tolerance of procedure: Tolerated well, no immediate complications          ED Course                                       MDM  Number of Diagnoses or Management Options  Pain, dental  Diagnosis management comments: 33 y/o F presenting with dental pain  Pain to tooth #2 with notable decay of tooth  No evidence of abscess  Plan for pain control, abx  Offered dental block which patient accepted with pain relief  Plan for dental clinic f/u  Pt agreeable, discharged  Disposition  Final diagnoses:   Pain, dental     Time reflects when diagnosis was documented in both MDM as applicable and the Disposition within this note     Time User Action Codes Description Comment    12/3/2022  8:08 PM Freddy Tan Add [K08 89] Pain, dental       ED Disposition     ED Disposition   Discharge    Condition   Stable    Date/Time   Sat Dec 3, 2022  8:08 PM    Comment   Sujit Rogers discharge to home/self care                 Follow-up Information     Follow up With Specialties Details Why Contact Info Additional [address] [facility]   [address] [id number]  [address], [address], [facility] [place] [place] [place], [address], [phone]          Patient's Medications   Discharge Prescriptions    CLINDAMYCIN (CLEOCIN) 300 MG CAPSULE    Take 1 capsule (300 mg total) by mouth 4 (four) times a day for 7 days       Start Date: 12/3/2022 End Date: 12/10/2022       Order Dose: 300 mg       Quantity: 28 capsule    Refills: 0    NAPROXEN (NAPROSYN) 500 MG TABLET    Take 1 tablet (500 mg total) by mouth 2 (two) times a day with meals       Start Date: 12/3/2022 End Date: --       Order Dose: 500 mg       Quantity: 30 tablet    Refills: 0     No discharge procedures on file  PDMP Review     None           ED Provider  Attending physically available and evaluated Nguyễn Guallpa I managed the patient along with the ED Attending      Electronically Signed by         Abdirahman Day MD  12/03/22 2027

## 2022-12-04 NOTE — ED ATTENDING ATTESTATION
12/3/2022  IChandu MD, saw and evaluated the patient  I have discussed the patient with the resident/non-physician practitioner and agree with the resident's/non-physician practitioner's findings, Plan of Care, and MDM as documented in the resident's/non-physician practitioner's note, except where noted  All available labs and Radiology studies were reviewed  I was present for key portions of any procedure(s) performed by the resident/non-physician practitioner and I was immediately available to provide assistance  At this point I agree with the current assessment done in the Emergency Department  I have conducted an independent evaluation of this patient a history and physical is as follows:    ED Course      59-year-old female presenting to the emergency department for evaluation of dental pain in tooth 2  No reported fever  No trismus  On examination the patient is noted to have moderately poor dentition with numerous caries and fillings  There is no gingival swelling  No trismus  Submental space is soft  No bucal swelling  Plan is antibiotics, anti-inflammatories, dental follow-up      Critical Care Time  Procedures

## 2023-02-27 ENCOUNTER — APPOINTMENT (EMERGENCY)
Dept: RADIOLOGY | Facility: HOSPITAL | Age: 33
End: 2023-02-27

## 2023-02-27 ENCOUNTER — HOSPITAL ENCOUNTER (EMERGENCY)
Facility: HOSPITAL | Age: 33
Discharge: HOME | End: 2023-02-27
Attending: EMERGENCY MEDICINE

## 2023-02-27 VITALS
OXYGEN SATURATION: 97 % | HEART RATE: 90 BPM | RESPIRATION RATE: 17 BRPM | DIASTOLIC BLOOD PRESSURE: 69 MMHG | SYSTOLIC BLOOD PRESSURE: 114 MMHG | TEMPERATURE: 97.4 F

## 2023-02-27 DIAGNOSIS — R11.2 NAUSEA AND VOMITING: ICD-10-CM

## 2023-02-27 DIAGNOSIS — R19.7 DIARRHEA: ICD-10-CM

## 2023-02-27 DIAGNOSIS — R10.13 EPIGASTRIC PAIN: Primary | ICD-10-CM

## 2023-02-27 LAB
ALBUMIN SERPL BCP-MCNC: 4.2 G/DL (ref 3.5–5)
ALP SERPL-CCNC: 76 U/L (ref 46–116)
ALT SERPL W P-5'-P-CCNC: 26 U/L (ref 12–78)
ANION GAP SERPL CALCULATED.3IONS-SCNC: 3 MMOL/L (ref 4–13)
AST SERPL W P-5'-P-CCNC: 18 U/L (ref 5–45)
BACTERIA UR QL AUTO: ABNORMAL /HPF
BASOPHILS # BLD AUTO: 0.01 THOUSANDS/ÂΜL (ref 0–0.1)
BASOPHILS NFR BLD AUTO: 0 % (ref 0–1)
BILIRUB SERPL-MCNC: 0.54 MG/DL (ref 0.2–1)
BILIRUB UR QL STRIP: NEGATIVE
BUN SERPL-MCNC: 14 MG/DL (ref 5–25)
CALCIUM SERPL-MCNC: 9.9 MG/DL (ref 8.3–10.1)
CHLORIDE SERPL-SCNC: 108 MMOL/L (ref 96–108)
CLARITY UR: CLEAR
CO2 SERPL-SCNC: 26 MMOL/L (ref 21–32)
COLOR UR: YELLOW
CREAT SERPL-MCNC: 0.78 MG/DL (ref 0.6–1.3)
EOSINOPHIL # BLD AUTO: 0.11 THOUSAND/ÂΜL (ref 0–0.61)
EOSINOPHIL NFR BLD AUTO: 1 % (ref 0–6)
ERYTHROCYTE [DISTWIDTH] IN BLOOD BY AUTOMATED COUNT: 12.2 % (ref 11.6–15.1)
EXT PREGNANCY TEST URINE: NEGATIVE
EXT. CONTROL: NORMAL
GFR SERPL CREATININE-BSD FRML MDRD: 100 ML/MIN/1.73SQ M
GLUCOSE SERPL-MCNC: 95 MG/DL (ref 65–140)
GLUCOSE UR STRIP-MCNC: NEGATIVE MG/DL
HCT VFR BLD AUTO: 47.9 % (ref 34.8–46.1)
HGB BLD-MCNC: 16 G/DL (ref 11.5–15.4)
HGB UR QL STRIP.AUTO: ABNORMAL
IMM GRANULOCYTES # BLD AUTO: 0.07 THOUSAND/UL (ref 0–0.2)
IMM GRANULOCYTES NFR BLD AUTO: 1 % (ref 0–2)
KETONES UR STRIP-MCNC: NEGATIVE MG/DL
LEUKOCYTE ESTERASE UR QL STRIP: NEGATIVE
LIPASE SERPL-CCNC: 216 U/L (ref 73–393)
LYMPHOCYTES # BLD AUTO: 1.29 THOUSANDS/ÂΜL (ref 0.6–4.47)
LYMPHOCYTES NFR BLD AUTO: 11 % (ref 14–44)
MCH RBC QN AUTO: 31.9 PG (ref 26.8–34.3)
MCHC RBC AUTO-ENTMCNC: 33.4 G/DL (ref 31.4–37.4)
MCV RBC AUTO: 96 FL (ref 82–98)
MONOCYTES # BLD AUTO: 0.64 THOUSAND/ÂΜL (ref 0.17–1.22)
MONOCYTES NFR BLD AUTO: 6 % (ref 4–12)
MUCOUS THREADS UR QL AUTO: ABNORMAL
NEUTROPHILS # BLD AUTO: 9.54 THOUSANDS/ÂΜL (ref 1.85–7.62)
NEUTS SEG NFR BLD AUTO: 81 % (ref 43–75)
NITRITE UR QL STRIP: NEGATIVE
NON-SQ EPI CELLS URNS QL MICRO: ABNORMAL /HPF
NRBC BLD AUTO-RTO: 0 /100 WBCS
PH UR STRIP.AUTO: 5.5 [PH] (ref 4.5–8)
PLATELET # BLD AUTO: 224 THOUSANDS/UL (ref 149–390)
PMV BLD AUTO: 9.6 FL (ref 8.9–12.7)
POTASSIUM SERPL-SCNC: 4.1 MMOL/L (ref 3.5–5.3)
PROT SERPL-MCNC: 8.1 G/DL (ref 6.4–8.4)
PROT UR STRIP-MCNC: NEGATIVE MG/DL
RBC # BLD AUTO: 5.01 MILLION/UL (ref 3.81–5.12)
RBC #/AREA URNS AUTO: ABNORMAL /HPF
SODIUM SERPL-SCNC: 137 MMOL/L (ref 135–147)
SP GR UR STRIP.AUTO: >=1.03 (ref 1–1.03)
UROBILINOGEN UR QL STRIP.AUTO: 0.2 E.U./DL
WBC # BLD AUTO: 11.66 THOUSAND/UL (ref 4.31–10.16)
WBC #/AREA URNS AUTO: ABNORMAL /HPF

## 2023-02-27 RX ORDER — ONDANSETRON 4 MG/1
4 TABLET, FILM COATED ORAL EVERY 6 HOURS
Qty: 8 TABLET | Refills: 0 | Status: SHIPPED | OUTPATIENT
Start: 2023-02-27 | End: 2023-03-01

## 2023-02-27 RX ORDER — MAGNESIUM HYDROXIDE/ALUMINUM HYDROXICE/SIMETHICONE 120; 1200; 1200 MG/30ML; MG/30ML; MG/30ML
30 SUSPENSION ORAL ONCE
Status: COMPLETED | OUTPATIENT
Start: 2023-02-27 | End: 2023-02-27

## 2023-02-27 RX ORDER — LIDOCAINE HYDROCHLORIDE 20 MG/ML
15 SOLUTION OROPHARYNGEAL ONCE
Status: COMPLETED | OUTPATIENT
Start: 2023-02-27 | End: 2023-02-27

## 2023-02-27 RX ORDER — ONDANSETRON 2 MG/ML
4 INJECTION INTRAMUSCULAR; INTRAVENOUS ONCE
Status: COMPLETED | OUTPATIENT
Start: 2023-02-27 | End: 2023-02-27

## 2023-02-27 RX ORDER — ACETAMINOPHEN 325 MG/1
975 TABLET ORAL ONCE
Status: COMPLETED | OUTPATIENT
Start: 2023-02-27 | End: 2023-02-27

## 2023-02-27 RX ORDER — KETOROLAC TROMETHAMINE 30 MG/ML
15 INJECTION, SOLUTION INTRAMUSCULAR; INTRAVENOUS ONCE
Status: COMPLETED | OUTPATIENT
Start: 2023-02-27 | End: 2023-02-27

## 2023-02-27 RX ORDER — SODIUM CHLORIDE, SODIUM GLUCONATE, SODIUM ACETATE, POTASSIUM CHLORIDE, MAGNESIUM CHLORIDE, SODIUM PHOSPHATE, DIBASIC, AND POTASSIUM PHOSPHATE .53; .5; .37; .037; .03; .012; .00082 G/100ML; G/100ML; G/100ML; G/100ML; G/100ML; G/100ML; G/100ML
1000 INJECTION, SOLUTION INTRAVENOUS ONCE
Status: COMPLETED | OUTPATIENT
Start: 2023-02-27 | End: 2023-02-27

## 2023-02-27 RX ADMIN — LIDOCAINE HYDROCHLORIDE 15 ML: 20 SOLUTION ORAL; TOPICAL at 14:06

## 2023-02-27 RX ADMIN — SODIUM CHLORIDE, SODIUM GLUCONATE, SODIUM ACETATE, POTASSIUM CHLORIDE AND MAGNESIUM CHLORIDE 1000 ML: 526; 502; 368; 37; 30 INJECTION, SOLUTION INTRAVENOUS at 14:06

## 2023-02-27 RX ADMIN — ALUMINUM HYDROXIDE, MAGNESIUM HYDROXIDE, AND SIMETHICONE 30 ML: 200; 200; 20 SUSPENSION ORAL at 14:06

## 2023-02-27 RX ADMIN — ACETAMINOPHEN 975 MG: 325 TABLET ORAL at 16:12

## 2023-02-27 RX ADMIN — ONDANSETRON 4 MG: 2 INJECTION INTRAMUSCULAR; INTRAVENOUS at 14:06

## 2023-02-27 RX ADMIN — KETOROLAC TROMETHAMINE 15 MG: 30 INJECTION, SOLUTION INTRAMUSCULAR; INTRAVENOUS at 16:12

## 2023-02-27 NOTE — ED ATTENDING ATTESTATION
2/27/2023  IMariela DO, saw and evaluated the patient  I have discussed the patient with the resident/non-physician practitioner and agree with the resident's/non-physician practitioner's findings, Plan of Care, and MDM as documented in the resident's/non-physician practitioner's note, except where noted  All available labs and Radiology studies were reviewed  I was present for key portions of any procedure(s) performed by the resident/non-physician practitioner and I was immediately available to provide assistance  At this point I agree with the current assessment done in the Emergency Department  I have conducted an independent evaluation of this patient a history and physical is as follows:    Patient is a 59-year-old female with a history of gastritis and GERD, accompanied by her   Rosalinaer indicates preferred language is Sami however she says she speaks [place] well and does not need a   Since last evening she been having some mild epigastric pain, worse with nothing and better with nothing  Some nausea, some subjective chills, had 2 episodes of nonbloody, nonbilious emesis as well as multiple episodes of nonbloody, non-mucousy diarrhea  No travel history, no sick contacts, no recent hospitalization or antibiotics  No recent hospitalization or surgeries  No camping or backpacking or unusual water exposure  She says she will occasionally get symptoms like this in terms of episodic abdominal pain lasting a day or 2 and then resolving  This feels somewhat similar  Says she has previously had an endoscopy which showed gastritis for which she was prescribed omeprazole which she is currently taking  He has no anorexia, no migration to the pain  No dysuria or hematuria   said he is feeling fine, unaware of sick contacts, has not seen any mental status changes with the patient      General:  Patient is well-appearing  Head:  Atraumatic  Eyes:  Conjunctiva pink  ENT:  Mucous membranes are moist  Neck:  Supple  Cardiac:  S1-S2, without murmurs  Lungs:  Clear to auscultation bilaterally  Abdomen: Mild diffuse upper abdominal tenderness, no tympany, no rigidity, no guarding, no CVA tenderness  Extremities:  Normal range of motion  Neurologic:  Awake, fluent speech, normal comprehension, AAOx3  Skin:  Pink warm and dry  Psychiatric:  Alert, pleasant, cooperative        ED Course     Medications   multi-electrolyte (ISOLYTE-S PH 7 4) bolus 1,000 mL (0 mL Intravenous Stopped 2/27/23 1646)   ondansetron (ZOFRAN) injection 4 mg (4 mg Intravenous Given 2/27/23 1406)   Lidocaine Viscous HCl (XYLOCAINE) 2 % mucosal solution 15 mL (15 mL Swish & Swallow Given 2/27/23 1406)   aluminum-magnesium hydroxide-simethicone (MYLANTA) oral suspension 30 mL (30 mL Oral Given 2/27/23 1406)   ketorolac (TORADOL) injection 15 mg (15 mg Intravenous Given 2/27/23 1612)   acetaminophen (TYLENOL) tablet 975 mg (975 mg Oral Given 2/27/23 1612)       Labs Reviewed   CBC AND DIFFERENTIAL - Abnormal       Result Value Ref Range Status    WBC 11 66 (*) 4 31 - 10 16 Thousand/uL Final    RBC 5 01  3 81 - 5 12 Million/uL Final    Hemoglobin 16 0 (*) 11 5 - 15 4 g/dL Final    Hematocrit 47 9 (*) 34 8 - 46 1 % Final    MCV 96  82 - 98 fL Final    MCH 31 9  26 8 - 34 3 pg Final    MCHC 33 4  31 4 - 37 4 g/dL Final    RDW 12 2  11 6 - 15 1 % Final    MPV 9 6  8 9 - 12 7 fL Final    Platelets 460  944 - 390 Thousands/uL Final    nRBC 0  /100 WBCs Final    Neutrophils Relative 81 (*) 43 - 75 % Final    Immat GRANS % 1  0 - 2 % Final    Lymphocytes Relative 11 (*) 14 - 44 % Final    Monocytes Relative 6  4 - 12 % Final    Eosinophils Relative 1  0 - 6 % Final    Basophils Relative 0  0 - 1 % Final    Neutrophils Absolute 9 54 (*) 1 85 - 7 62 Thousands/µL Final    Immature Grans Absolute 0 07  0 00 - 0 20 Thousand/uL Final    Lymphocytes Absolute 1 29  0 60 - 4 47 Thousands/µL Final    Monocytes Absolute 0 64 0 17 - 1 22 Thousand/µL Final    Eosinophils Absolute 0 11  0 00 - 0 61 Thousand/µL Final    Basophils Absolute 0 01  0 00 - 0 10 Thousands/µL Final   COMPREHENSIVE METABOLIC PANEL - Abnormal    Sodium 137  135 - 147 mmol/L Final    Potassium 4 1  3 5 - 5 3 mmol/L Final    Chloride 108  96 - 108 mmol/L Final    CO2 26  21 - 32 mmol/L Final    ANION GAP 3 (*) 4 - 13 mmol/L Final    BUN 14  5 - 25 mg/dL Final    Creatinine 0 78  0 60 - 1 30 mg/dL Final    Comment: Standardized to IDMS reference method    Glucose 95  65 - 140 mg/dL Final    Comment: If the patient is fasting, the ADA then defines impaired fasting glucose as > 100 mg/dL and diabetes as > or equal to 123 mg/dL  Specimen collection should occur prior to Sulfasalazine administration due to the potential for falsely depressed results  Specimen collection should occur prior to Sulfapyridine administration due to the potential for falsely elevated results  Calcium 9 9  8 3 - 10 1 mg/dL Final    AST 18  5 - 45 U/L Final    Comment: Specimen collection should occur prior to Sulfasalazine administration due to the potential for falsely depressed results  ALT 26  12 - 78 U/L Final    Comment: Specimen collection should occur prior to Sulfasalazine and/or Sulfapyridine administration due to the potential for falsely depressed results  Alkaline Phosphatase 76  46 - 116 U/L Final    Total Protein 8 1  6 4 - 8 4 g/dL Final    Albumin 4 2  3 5 - 5 0 g/dL Final    Total Bilirubin 0 54  0 20 - 1 00 mg/dL Final    Comment: Use of this assay is not recommended for patients undergoing treatment with eltrombopag due to the potential for falsely elevated results      eGFR 100  ml/min/1 73sq m Final    Narrative:     [place] guidelines for Chronic Kidney Disease (CKD):   •  Stage 1 with normal or high GFR (GFR > 90 mL/min/1 73 square meters)  •  Stage 2 Mild CKD (GFR = 60-89 mL/min/1 73 square meters)  •  Stage 3A Moderate CKD (GFR = 45-59 mL/min/1 73 square meters)  •  Stage 3B Moderate CKD (GFR = 30-44 mL/min/1 73 square meters)  •  Stage 4 Severe CKD (GFR = 15-29 mL/min/1 73 square meters)  •  Stage 5 End Stage CKD (GFR <15 mL/min/1 73 square meters)  Note: GFR calculation is accurate only with a steady state creatinine   URINE MICROSCOPIC - Abnormal    RBC, UA None Seen  None Seen, 1-2 /hpf Final    WBC, UA 1-2  None Seen, 1-2 /hpf Final    Epithelial Cells Occasional  None Seen, Occasional /hpf Final    Bacteria, UA Occasional  None Seen, Occasional /hpf Final    MUCUS THREADS Moderate (*) None Seen Final   URINE MACROSCOPIC, POC - Abnormal    Color, UA Yellow   Final    Clarity, UA Clear   Final    pH, UA 5 5  4 5 - 8 0 Final    Leukocytes, UA Negative  Negative Final    Nitrite, UA Negative  Negative Final    Protein, UA Negative  Negative mg/dl Final    Glucose, UA Negative  Negative mg/dl Final    Ketones, UA Negative  Negative mg/dl Final    Urobilinogen, UA 0 2  0 2, 1 0 E U /dl E U /dl Final    Bilirubin, UA Negative  Negative Final    Occult Blood, UA Large (*) Negative Final    Specific Gravity, UA >=1 030  1 003 - 1 030 Final    Narrative:     CLINITEK RESULT   LIPASE - Normal    Lipase 216  73 - 393 u/L Final   POCT PREGNANCY, URINE - Normal    EXT Preg Test, Ur Negative   Final    Control Valid   Final       CT abdomen pelvis wo contrast   Final Result      No acute CT findings in the abdomen or pelvis  Hepatic steatosis  Workstation performed: [id number]           At this point the cause the patient's symptoms is unclear but unlikely to represent an acute emergency  Urine pregnancy negative, ectopic pregnancy unlikely, patient overall well-appearing, no acute abnormality per CT  Most likely has exacerbation of her chronic gastritis      MEDICAL DECISION MAKING CODING    The differential diagnosis before testing included (but is not limited to) acute nonspecific abdominal pain, acute nausea and vomiting, acute cholecystitis, acute pancreatitis, acute small bowel obstruction, and acute ectopic pregnancy which is a medical condition that poses a threat to life/function  Chronic conditions affecting care: As per HPI    COLLECTION AND INTERPRETATION OF DATA  Additional history obtained from:   I reviewed prior external notes, including abdomen and pelvis CT from January 16, 2019, which showed no significant acute pathology  I ordered each unique test  Tests reviewed personally by me:  Labs: Reviewed by me as noted above  Imaging: I independently reviewed the abdomen and pelvis CT and found no acute pathology  RISK  Drugs (OTC, Rx, Controlled substances): Prescription management  All of the patient's current prescription medications should be continued      Surgery  -I considered surgery may be necessary prior to completion of the work up but afterwards there is no indication for immediate surgery      Critical Care Time  Procedures

## 2023-02-27 NOTE — Clinical Note
Deni Walker accompanied Maryann Aguilar to the emergency department on 2/27/2023  Return date if applicable: 12/50/7696        If you have any questions or concerns, please don't hesitate to call        Sachin Angelo MD

## 2023-02-27 NOTE — Clinical Note
Jason Samson accompanied Sierra Castellano to the emergency department on 2/27/2023  Return date if applicable: 84/38/2007        If you have any questions or concerns, please don't hesitate to call        Han Elmore MD

## 2023-02-28 NOTE — ED PROVIDER NOTES
History  Chief Complaint   Patient presents with   • Abdominal Pain     Patient started with abdominal pain last night  Patient now having diarrhea and nausea and chills  26-year-old female past medical history of GERD, fibromyalgia, obesity presents the ED complaining of nausea, vomiting diarrhea and epigastric abdominal pain since yesterday evening  Patient tells me that she may have salmon and pasta last night for dinner at home  She believes the salmon was fully cooked, nobody else at home also ate the same dinner  She went to bed out any incident and woke up with nausea, vomiting and epigastric abdominal pain  States she does occasionally get episodes of epigastric abdominal pain  She has seen GI in the past, had an EGD which showed severe gastritis and was recommended to take omeprazole which she does on a daily basis  Patient had 2 episodes of vomiting this morning and has had 7 episodes of diarrhea  She describes the diarrhea as brown to now clear as water  No blood in her stool  No fevers or chills no chest pain cough or shortness of breath  No hematuria dysuria  She has normal 28-day cycles, LMP around 1/20  Patient does have chronic skill skeletal pain secondary to her fibromyalgia specifically of her lower back and with standing for which she takes naproxen on a nearly daily basis  Prior to Admission Medications   Prescriptions Last Dose Informant Patient Reported? Taking?    DICLOFENAC SODIUM ER PO   Yes No   Sig: Take by mouth   Patient not taking: Reported on 7/12/2022   DULoxetine (CYMBALTA) 30 mg delayed release capsule   Yes No   Sig: Take 30 mg by mouth daily   Patient not taking: Reported on 7/12/2022   acetaminophen (TYLENOL) 325 mg tablet   No No   Sig: Take 3 tablets (975 mg total) by mouth every 8 (eight) hours   Patient taking differently: Take 975 mg by mouth every 8 (eight) hours Takes sometimes   baclofen 10 mg tablet   No No   Sig: Take one tablet by mouth every 8 hours for back and leg spasm   Patient not taking: Reported on 2020   baclofen 20 mg tablet   Yes No   Sig: TAKE 1 TABLET BY MOUTH TWICE A DAY AS NEEDED FOR PAIN/SPASM   Patient not taking: Reported on 2022   esomeprazole (NexIUM) 10 MG packet   Yes No   Sig: Take by mouth every morning before breakfast   gabapentin (NEURONTIN) 100 mg capsule   No No   Sig: Take 1 capsule (100 mg total) by mouth 3 (three) times a day   Patient taking differently: Take 100 mg by mouth 3 (three) times a day Rarely takes   gabapentin (NEURONTIN) 300 mg capsule   Yes No   Sig: TAKE 1 TABLET BY MOUTH IN THE MORNING AND TAKE 2 TABLETS AT BEDTIME   hyoscyamine (ANASPAZ,LEVSIN) 0 125 MG tablet   Yes No   Sig: TAKE 1 TABLET BY MOUTH 3 TIMES A DAY AS NEEDED FOR LOOSE BOWELS   Patient not taking: Reported on 2022   ibuprofen (MOTRIN) 600 mg tablet   Yes No   Sig: Take by mouth as needed   lidocaine (LIDODERM) 5 %   No No   Sig: Apply 1 patch topically daily Remove & Discard patch within 12 hours or as directed by MD   meloxicam (MOBIC) 15 mg tablet   Yes No   Sig: Take 15 mg by mouth daily   Patient not taking: Reported on 2022   naproxen (NAPROSYN) 500 mg tablet   Yes No   Sig: Take 500 mg by mouth 2 (two) times a day with meals Pt unsure dosage but is currently taking   naproxen (Naprosyn) 500 mg tablet   No No   Sig: Take 1 tablet (500 mg total) by mouth 2 (two) times a day with meals   omeprazole (PriLOSEC) 20 mg delayed release capsule   Yes No   Sig: Take 1 tablet by mouth daily   predniSONE 20 mg tablet   No No   Si tab TID x 2 days, then BID x 2 days, then daily x 2 days   Patient not taking: Reported on 2022      Facility-Administered Medications: None       Past Medical History:   Diagnosis Date   • Fibromyalgia    • GERD (gastroesophageal reflux disease)    • Obesity        Past Surgical History:   Procedure Laterality Date   • HERNIA REPAIR     • POSTERIOR LAMINECTOMY THORACIC AND LUMBAR SPINE Left 2/13/2019    Procedure: LUMBAR LAMINOTOMY/DISCECTOMY L5-S1, LEFT;  Surgeon: Naveen Boateng MD;  Location: [facility];  Service: Neurosurgery   • NH COLONOSCOPY FLX DX W/COLLJ SPEC WHEN PFRMD N/A 12/20/2018    Procedure: COLONOSCOPY;  Surgeon: Yair Tsang MD;  Location: [facility]; Service: Gastroenterology   • NH ESOPHAGOGASTRODUODENOSCOPY TRANSORAL DIAGNOSTIC N/A 12/20/2018    Procedure: ESOPHAGOGASTRODUODENOSCOPY (EGD); Surgeon: Yair Tsang MD;  Location: [facility]; Service: Gastroenterology   • TUBAL LIGATION         Family History   Problem Relation Age of Onset   • Diabetes Mother    • Diabetes Father    • Diabetes Sister    • Coronary artery disease Paternal Grandmother      I have reviewed and agree with the history as documented  E-Cigarette/Vaping   • E-Cigarette Use Never User      E-Cigarette/Vaping Substances     Social History     Tobacco Use   • Smoking status: Never   • Smokeless tobacco: Never   Vaping Use   • Vaping Use: Never used   Substance Use Topics   • Alcohol use: No   • Drug use: No        Review of Systems   Constitutional: Negative for chills and fever  HENT: Negative for ear pain and sore throat  Eyes: Negative for pain and visual disturbance  Respiratory: Negative for cough and shortness of breath  Cardiovascular: Negative for chest pain and palpitations  Gastrointestinal: Positive for abdominal pain, diarrhea, nausea and vomiting  Genitourinary: Negative for dysuria and hematuria  Musculoskeletal: Negative for arthralgias and back pain  Skin: Negative for color change and rash  Neurological: Negative for seizures and syncope  All other systems reviewed and are negative        Physical Exam  ED Triage Vitals [02/27/23 1117]   Temperature Pulse Respirations Blood Pressure SpO2   (!) 97 4 °F (36 3 °C) 103 20 167/93 96 %      Temp Source Heart Rate Source Patient Position - Orthostatic VS BP Location FiO2 (%)   Temporal Monitor Sitting Left arm --      Pain Score       10 - Worst Possible Pain             Orthostatic Vital Signs  Vitals:    02/27/23 1117 02/27/23 1358 02/27/23 1646 02/27/23 1700   BP: 167/93 124/77 108/62 114/69   Pulse: 103 104 92 90   Patient Position - Orthostatic VS: Sitting  Lying Lying       Physical Exam  Vitals and nursing note reviewed  Constitutional:       General: She is not in acute distress  Appearance: She is well-developed  She is obese  HENT:      Head: Normocephalic and atraumatic  Mouth/Throat:      Mouth: Mucous membranes are moist    Eyes:      Conjunctiva/sclera: Conjunctivae normal    Cardiovascular:      Rate and Rhythm: Normal rate and regular rhythm  Heart sounds: No murmur heard  Pulmonary:      Effort: Pulmonary effort is normal  No respiratory distress  Breath sounds: Normal breath sounds  Abdominal:      Palpations: Abdomen is soft  Tenderness: There is abdominal tenderness in the right upper quadrant and epigastric area  Musculoskeletal:         General: No swelling  Cervical back: Neck supple  Skin:     General: Skin is warm and dry  Capillary Refill: Capillary refill takes less than 2 seconds  Neurological:      Mental Status: She is alert     Psychiatric:         Mood and Affect: Mood normal          ED Medications  Medications   multi-electrolyte (ISOLYTE-S PH 7 4) bolus 1,000 mL (0 mL Intravenous Stopped 2/27/23 1646)   ondansetron (ZOFRAN) injection 4 mg (4 mg Intravenous Given 2/27/23 1406)   Lidocaine Viscous HCl (XYLOCAINE) 2 % mucosal solution 15 mL (15 mL Swish & Swallow Given 2/27/23 1406)   aluminum-magnesium hydroxide-simethicone (MYLANTA) oral suspension 30 mL (30 mL Oral Given 2/27/23 1406)   ketorolac (TORADOL) injection 15 mg (15 mg Intravenous Given 2/27/23 1612)   acetaminophen (TYLENOL) tablet 975 mg (975 mg Oral Given 2/27/23 1612)       Diagnostic Studies  Results Reviewed     Procedure Component Value Units Date/Time    Urine Microscopic [[id number]] (Abnormal) Collected: 02/27/23 1356    Lab Status: Final result Specimen: Urine, Clean Catch Updated: 02/27/23 1453     RBC, UA None Seen /hpf      WBC, UA 1-2 /hpf      Epithelial Cells Occasional /hpf      Bacteria, UA Occasional /hpf      MUCUS THREADS Moderate    POCT pregnancy, urine [[id number]]  (Normal) Resulted: 02/27/23 1358    Lab Status: Final result Updated: 02/27/23 1358     EXT Preg Test, Ur Negative     Control Valid    Urine Macroscopic, POC [[id number]]  (Abnormal) Collected: 02/27/23 1356    Lab Status: Final result Specimen: Urine Updated: 02/27/23 1357     Color, UA Yellow     Clarity, UA Clear     pH, UA 5 5     Leukocytes, UA Negative     Nitrite, UA Negative     Protein, UA Negative mg/dl      Glucose, UA Negative mg/dl      Ketones, UA Negative mg/dl      Urobilinogen, UA 0 2 E U /dl      Bilirubin, UA Negative     Occult Blood, UA Large     Specific Gravity, UA >=1 030    Narrative:      CLINITEK RESULT    Comprehensive metabolic panel [[id number]]  (Abnormal) Collected: 02/27/23 1116    Lab Status: Final result Specimen: Blood from Arm, Right Updated: 02/27/23 1201     Sodium 137 mmol/L      Potassium 4 1 mmol/L      Chloride 108 mmol/L      CO2 26 mmol/L      ANION GAP 3 mmol/L      BUN 14 mg/dL      Creatinine 0 78 mg/dL      Glucose 95 mg/dL      Calcium 9 9 mg/dL      AST 18 U/L      ALT 26 U/L      Alkaline Phosphatase 76 U/L      Total Protein 8 1 g/dL      Albumin 4 2 g/dL      Total Bilirubin 0 54 mg/dL      eGFR 100 ml/min/1 73sq m     Narrative:      National Kid[employer] guidelines for Chronic Kidney Disease (CKD):   •  Stage 1 with normal or high GFR (GFR > 90 mL/min/1 73 square meters)  •  Stage 2 Mild CKD (GFR = 60-89 mL/min/1 73 square meters)  •  Stage 3A Moderate CKD (GFR = 45-59 mL/min/1 73 square meters)  •  Stage 3B Moderate CKD (GFR = 30-44 mL/min/1 73 square meters)  •  Stage 4 Severe CKD (GFR = 15-29 mL/min/1 73 square meters)  •  Stage 5 End Stage CKD (GFR <15 mL/min/1 73 square meters)  Note: GFR calculation is accurate only with a steady state creatinine    Lipase [[id number]]  (Normal) Collected: 02/27/23 1116    Lab Status: Final result Specimen: Blood from Arm, Right Updated: 02/27/23 1200     Lipase 216 u/L     CBC and differential [[id number]]  (Abnormal) Collected: 02/27/23 1116    Lab Status: Final result Specimen: Blood from Arm, Right Updated: 02/27/23 1141     WBC 11 66 Thousand/uL      RBC 5 01 Million/uL      Hemoglobin 16 0 g/dL      Hematocrit 47 9 %      MCV 96 fL      MCH 31 9 pg      MCHC 33 4 g/dL      RDW 12 2 %      MPV 9 6 fL      Platelets 560 Thousands/uL      nRBC 0 /100 WBCs      Neutrophils Relative 81 %      Immat GRANS % 1 %      Lymphocytes Relative 11 %      Monocytes Relative 6 %      Eosinophils Relative 1 %      Basophils Relative 0 %      Neutrophils Absolute 9 54 Thousands/µL      Immature Grans Absolute 0 07 Thousand/uL      Lymphocytes Absolute 1 29 Thousands/µL      Monocytes Absolute 0 64 Thousand/µL      Eosinophils Absolute 0 11 Thousand/µL      Basophils Absolute 0 01 Thousands/µL                  CT abdomen pelvis wo contrast   Final Result by Yolie Benton MD (02/27 1545)      No acute CT findings in the abdomen or pelvis  Hepatic steatosis  Workstation performed: [id number]               Procedures  Procedures      ED Course  ED Course as of 02/27/23 2039 Mon Feb 27, 2023   1604 On re-evaluation, nausea improved, patient continues to complain of epigastric pain, no relief from prior  Will try tylenol and toradol and re-evaluate  [id number] Patient reevaluated, pain much improved  Discharge patient with few doses of Zofran for nausea and likely viral gastroenteritis  SBIRT 22yo+    Flowsheet Row Most Recent Value   SBIRT (25 yo +)    In order to provide better care to our patients, we are screening all of our patients for alcohol and drug use   Would it be okay to ask you these screening questions? Unable to answer at this time Filed at: 02/27/2023 3691                Medical Decision Making  28-year-old female past medical history of GERD, fibromyalgia, obesity presents the ED complaining of nausea, vomiting diarrhea and epigastric abdominal pain since yesterday evening  DDx: Viral gastroenteritis, food poisoning, gastritis (secondary to chronic NSAID use versus H  Pylori), cholecystitis  We will obtain basic labs, CT abdomen pelvis to rule out cholecystitis treat patient symptomatically for nausea vomiting and pain  GI cocktail given with fluids  On reevaluation, patient endorses resolution of nausea however continues to have epigastric abdominal pain  CT abdomen pelvis with no acute pathology  Patient treated again with Tylenol, Toradol with resolution of epigastric abdominal pain  Counseled patient that she likely has gastroenteritis for which she should continue symptomatic treatment at home with hydration, bland diet, Zofran and Tylenol for nausea and pain  Commended discontinuing naproxen as can be an irritating exacerbating factor to her epigastric pain  Recommending following up with GI and PCP  Patient understanding  Discussed strict return precautions to the ED for any new or worsening abdominal pain any chest pain continued nausea or vomiting  Patient understanding, discharged home in stable condition to follow-up with GI and PCP  Diarrhea: complicated acute illness or injury  Epigastric pain: complicated acute illness or injury  Nausea and vomiting: complicated acute illness or injury  Amount and/or Complexity of Data Reviewed  Labs: ordered  Radiology: ordered  Risk  OTC drugs  Prescription drug management              Disposition  Final diagnoses:   Epigastric pain   Nausea and vomiting   Diarrhea     Time reflects when diagnosis was documented in both MDM as applicable and the Disposition within this note     Time User Action Codes Description Comment    2/27/2023  4:49 PM Jorge Samsonck Add [R10 13] Epigastric pain     2/27/2023  4:49 PM Jorge Fragosocock Add [R11 2] Nausea and vomiting     2/27/2023  4:49 PM Jorge Fragosocock Add [R19 7] Diarrhea       ED Disposition     ED Disposition   Discharge    Condition   Stable    Date/Time   Mon Feb 27, 2023  4:54 PM    Comment   Maryann [place] discharge to home/self care                 Follow-up Information     Follow up With Specialties Details Why [address], [place] Nurse Practitioner Schedule an appointment as soon as possible for a visit   [address] [facility]  [phone]            Discharge Medication List as of 2/27/2023  4:54 PM      START taking these medications    Details   ondansetron (ZOFRAN) 4 mg tablet Take 1 tablet (4 mg total) by mouth every 6 (six) hours for 2 days, Starting Mon 2/27/2023, Until Wed 3/1/2023, Normal         CONTINUE these medications which have NOT CHANGED    Details   acetaminophen (TYLENOL) 325 mg tablet Take 3 tablets (975 mg total) by mouth every 8 (eight) hours, Starting Sat 2/16/2019, No Print      !! baclofen 10 mg tablet Take one tablet by mouth every 8 hours for back and leg spasm, Normal      !! baclofen 20 mg tablet TAKE 1 TABLET BY MOUTH TWICE A DAY AS NEEDED FOR PAIN/SPASM, Historical Med      DICLOFENAC SODIUM ER PO Take by mouth, Historical Med      DULoxetine (CYMBALTA) 30 mg delayed release capsule Take 30 mg by mouth daily, Historical Med      esomeprazole (NexIUM) 10 MG packet Take by mouth every morning before breakfast, Historical Med      !! gabapentin (NEURONTIN) 100 mg capsule Take 1 capsule (100 mg total) by mouth 3 (three) times a day, Starting Sat 2/16/2019, No Print      !! gabapentin (NEURONTIN) 300 mg capsule TAKE 1 TABLET BY MOUTH IN THE MORNING AND TAKE 2 TABLETS AT BEDTIME, Historical Med      hyoscyamine (ANASPAZ,LEVSIN) 0 125 MG tablet TAKE 1 TABLET BY MOUTH 3 TIMES A DAY AS NEEDED FOR LOOSE BOWELS, Historical Med      ibuprofen (MOTRIN) 600 mg tablet Take by mouth as needed, Starting Mon 4/4/2016, Historical Med      lidocaine (LIDODERM) 5 % Apply 1 patch topically daily Remove & Discard patch within 12 hours or as directed by MD, Starting Tue 4/19/2022, Normal      meloxicam (MOBIC) 15 mg tablet Take 15 mg by mouth daily, Historical Med      !! naproxen (NAPROSYN) 500 mg tablet Take 500 mg by mouth 2 (two) times a day with meals Pt unsure dosage but is currently taking, Historical Med      !! naproxen (Naprosyn) 500 mg tablet Take 1 tablet (500 mg total) by mouth 2 (two) times a day with meals, Starting Sat 12/3/2022, Normal      omeprazole (PriLOSEC) 20 mg delayed release capsule Take 1 tablet by mouth daily, Starting Tue 2/28/2017, Historical Med      predniSONE 20 mg tablet 1 tab TID x 2 days, then BID x 2 days, then daily x 2 days, Normal       !! - Potential duplicate medications found  Please discuss with provider  No discharge procedures on file  PDMP Review     None           ED Provider  Attending physically available and evaluated Levander Foots  I managed the patient along with the ED Attending      Electronically Signed by         Alyssa Doyle MD  02/27/23 2039

## 2023-06-16 ENCOUNTER — HOSPITAL ENCOUNTER (OUTPATIENT)
Dept: RADIOLOGY | Facility: HOSPITAL | Age: 33
Discharge: HOME | End: 2023-06-16
Payer: COMMERCIAL

## 2023-06-16 DIAGNOSIS — M25.562 LEFT KNEE PAIN, UNSPECIFIED CHRONICITY: ICD-10-CM

## 2023-06-16 PROCEDURE — 73562 X-RAY EXAM OF KNEE 3: CPT

## 2023-06-17 ENCOUNTER — OFFICE VISIT (OUTPATIENT)
Dept: OBGYN CLINIC | Facility: CLINIC | Age: 33
End: 2023-06-17
Payer: COMMERCIAL

## 2023-06-17 VITALS
HEIGHT: 58 IN | DIASTOLIC BLOOD PRESSURE: 77 MMHG | WEIGHT: 185 LBS | SYSTOLIC BLOOD PRESSURE: 118 MMHG | BODY MASS INDEX: 38.83 KG/M2 | HEART RATE: 84 BPM

## 2023-06-17 DIAGNOSIS — M25.562 KNEE MENISCUS PAIN, LEFT: ICD-10-CM

## 2023-06-17 DIAGNOSIS — M22.2X2 PATELLOFEMORAL DISORDER OF LEFT KNEE: ICD-10-CM

## 2023-06-17 DIAGNOSIS — M25.562 ACUTE PAIN OF LEFT KNEE: Primary | ICD-10-CM

## 2023-06-17 PROCEDURE — 99203 OFFICE O/P NEW LOW 30 MIN: CPT | Performed by: PHYSICIAN ASSISTANT

## 2023-06-17 NOTE — PROGRESS NOTES
Orthopaedic Surgery - Office Note  Callie Alvarenga (32 y o  female)   : 1990   MRN: [id number]  Encounter Date: 2023    Chief Complaint   Patient presents with   • Left Knee - Pain         Assessment/Plan  Diagnoses and all orders for this visit:    Acute pain of left knee  -     Durable Medical Equipment  -     Ambulatory Referral to Physical Therapy; Future  -     Ambulatory Referral to Orthopedic Surgery; Future    Patellofemoral disorder of left knee  -     Durable Medical Equipment  -     Ambulatory Referral to Physical Therapy; Future  -     Ambulatory Referral to Orthopedic Surgery; Future    Knee meniscus pain, left  -     Durable Medical Equipment  -     Ambulatory Referral to Physical Therapy; Future  -     Ambulatory Referral to Orthopedic Surgery; Future    The diagnosis as well as treatment options were reviewed with the patient in the office today  She was advised her symptoms are most consistent with patellofemoral syndrome although there is a small meniscal tear component included  At this time I would recommend conservative treatments of icing the knee 20 minutes on 1 hour off 3 times a day  A hinged knee brace which will be provided in the office today to be worn when active  She may continue her oral naproxen with food stopping and calling if any stomach upset occurs  I would recommend formal physical therapy for evaluation and treatment  She will return for recheck with orthopedic surgeon in 4 to 6 weeks at which time advanced imaging could be considered if not improved with conservative care  All question and concerns were answered in the office today     Return for Recheck with orthopedic surgeon in 4-6 weeks  History of Present Illness  This is a new patient with left knee pain  Patient presents with x-rays that were completed yesterday but not read by radiology yet  Patient reports she has had several weeks of pain in the left knee without any known trauma    She "reports diffuse pain through the entire knee  She localizes the pain to the anterior knee as well as the medial lateral joint line  She denies any mechanical locking or clicking  She denies any instability symptoms  She reports the pain is increased with going up or down stairs or sitting for long period of time and standing up  She also notes medial lateral pain with twisting  She has not noticed swelling in the knee  She denies any calf pain or paresthesias  She has not had problems like this in the past   She is treated with oral naproxen  No paresthesias are reported  She has a contributing medical history of fibromyalgia  Review of Systems  Pertinent items are noted in HPI  All other systems were reviewed and are negative  Physical Exam  /77   Pulse 84   Ht 4' 10\" (1 473 m)   Wt 83 9 kg (185 lb)   BMI 38 67 kg/m²   Cons: Appears well  No apparent distress  Psych: Alert  Oriented x3  Mood and affect normal     Left knee is without intra-articular effusion today  There is no skin breakdown lesions or signs of infection  She has full extension and flexes to 130 degrees  Quad strength is 4 out of 5  Hamstring strength is 5 out of 5  She has no pain or instability with valgus and varus stressing  She is tender on the medial joint line and the lateral joint line  She has negative medial and lateral Connie's  She has patellar apprehension and moderate retropatellar crepitus through her range of motion  Her extensor mechanism is intact without any lag  Her gait is heel-to-toe  There is no calf tenderness and a negative Homans  She has a negative straight leg raise  There is no instability with Lachman or posterior drawer              Studies Reviewed  X-rays performed yesterday but not yet interpreted show 3 views left knee no acute fractures or dislocations  Mild medial joint space narrowing seen      X-rays were reviewed from orthopedic standpoint will await official " radiologist interpretation    Procedures  No procedures today  Medical, Surgical, Family, and Social History  The patient's medical history, family history, and social history, were reviewed and updated as appropriate  Past Medical History:   Diagnosis Date   • Fibromyalgia    • GERD (gastroesophageal reflux disease)    • Obesity        Past Surgical History:   Procedure Laterality Date   • HERNIA REPAIR     • POSTERIOR LAMINECTOMY THORACIC AND LUMBAR SPINE Left 2/13/2019    Procedure: LUMBAR LAMINOTOMY/DISCECTOMY L5-S1, LEFT;  Surgeon: Jaquelin Benitez MD;  Location: [facility];  Service: Neurosurgery   • WY COLONOSCOPY FLX DX W/COLLJ SPEC WHEN PFRMD N/A 12/20/2018    Procedure: COLONOSCOPY;  Surgeon: Megan Arnold MD;  Location: [facility]; Service: Gastroenterology   • WY ESOPHAGOGASTRODUODENOSCOPY TRANSORAL DIAGNOSTIC N/A 12/20/2018    Procedure: ESOPHAGOGASTRODUODENOSCOPY (EGD); Surgeon: Megan Arnold MD;  Location: [facility];   Service: Gastroenterology   • TUBAL LIGATION         Family History   Problem Relation Age of Onset   • Diabetes Mother    • Diabetes Father    • Diabetes Sister    • Coronary artery disease Paternal Grandmother        Social History     Occupational History   • Not on file   Tobacco Use   • Smoking status: Never   • Smokeless tobacco: Never   Vaping Use   • Vaping Use: Never used   Substance and Sexual Activity   • Alcohol use: No   • Drug use: No   • Sexual activity: Not on file       Allergies   Allergen Reactions   • Septra [Sulfamethoxazole-Trimethoprim] Rash and Shortness Of Breath   • Ciprofloxacin Hcl    • Ciprofloxacin Rash   • Penicillins Rash   • Zosyn [Piperacillin Sod-Tazobactam So] Hives and Rash         Current Outpatient Medications:   •  acetaminophen (TYLENOL) 325 mg tablet, Take 3 tablets (975 mg total) by mouth every 8 (eight) hours (Patient taking differently: Take 975 mg by mouth every 8 (eight) hours Takes sometimes), Disp: 30 tablet, Rfl: 0  •  baclofen 20 mg tablet, , Disp: , Rfl: 1  •  esomeprazole (NexIUM) 10 MG packet, Take by mouth every morning before breakfast, Disp: , Rfl:   •  gabapentin (NEURONTIN) 100 mg capsule, Take 1 capsule (100 mg total) by mouth 3 (three) times a day (Patient taking differently: Take 100 mg by mouth 3 (three) times a day Rarely takes), Disp: 90 capsule, Rfl: 0  •  gabapentin (NEURONTIN) 300 mg capsule, TAKE 1 TABLET BY MOUTH IN THE MORNING AND TAKE 2 TABLETS AT BEDTIME, Disp: , Rfl: 0  •  ibuprofen (MOTRIN) 600 mg tablet, Take by mouth as needed, Disp: , Rfl:   •  lidocaine (LIDODERM) 5 %, Apply 1 patch topically daily Remove & Discard patch within 12 hours or as directed by MD, Disp: 15 patch, Rfl: 0  •  naproxen (NAPROSYN) 500 mg tablet, Take 500 mg by mouth 2 (two) times a day with meals Pt unsure dosage but is currently taking, Disp: , Rfl:   •  naproxen (Naprosyn) 500 mg tablet, Take 1 tablet (500 mg total) by mouth 2 (two) times a day with meals, Disp: 30 tablet, Rfl: 0  •  omeprazole (PriLOSEC) 20 mg delayed release capsule, Take 1 tablet by mouth daily, Disp: , Rfl:   •  baclofen 10 mg tablet, Take one tablet by mouth every 8 hours for back and leg spasm (Patient not taking: Reported on 2/17/2020), Disp: 21 tablet, Rfl: 0  •  DICLOFENAC SODIUM ER PO, Take by mouth (Patient not taking: Reported on 7/12/2022), Disp: , Rfl:   •  DULoxetine (CYMBALTA) 30 mg delayed release capsule, Take 30 mg by mouth daily (Patient not taking: Reported on 7/12/2022), Disp: , Rfl:   •  hyoscyamine (ANASPAZ,LEVSIN) 0 125 MG tablet, TAKE 1 TABLET BY MOUTH 3 TIMES A DAY AS NEEDED FOR LOOSE BOWELS (Patient not taking: Reported on 7/12/2022), Disp: , Rfl: 0  •  meloxicam (MOBIC) 15 mg tablet, Take 15 mg by mouth daily (Patient not taking: Reported on 7/12/2022), Disp: , Rfl:   •  ondansetron (ZOFRAN) 4 mg tablet, Take 1 tablet (4 mg total) by mouth every 6 (six) hours for 2 days, Disp: 8 tablet, Rfl: 0  •  predniSONE 20 mg tablet, 1 tab TID x 2 days, then BID x 2 days, then daily x 2 days (Patient not taking: Reported on 7/12/2022), Disp: 10 tablet, Rfl: 0      Taylor Howell PA-C

## 2023-06-17 NOTE — PATIENT INSTRUCTIONS
Meniscus Tear   WHAT YOU NEED TO KNOW:   What is a meniscus tear? A meniscus tear is a tear in the cartilage of your knee  The meniscus is a piece of cartilage (strong tissue) between your thighbone and shinbone  The meniscus helps to cushion your knee joint and keep it stable  What causes a meniscus tear? A meniscus tear can occur if you twist your knee  A meniscus tear can happen during sports that involve squatting and twisting the knee, such as football or basketball  Weak and thinned meniscus cartilage in older people can increase the risk for a meniscus tear  What are the signs and symptoms of a meniscus tear? A pop or tear when the injury happens    Pain and swelling    Tenderness    Stiffness    Popping, catching, or locking of your knee    Not being able to extend your knee fully    How is a meniscus tear diagnosed? Your healthcare provider will examine your knee  Your provider may bend your knee and then straighten and rotate it  Your provider may also order x-rays and an MRI of your knee  An MRI takes pictures of your knee to show the meniscus tear  You may be given contrast liquid to help the tear show up better  Tell the healthcare provider if you have ever had an allergic reaction to contrast liquid  Do not enter the MRI room with anything metal  Metal can cause serious injury  Tell the healthcare provider if you have any metal in or on your body  How is a meniscus tear treated? Treatment depends on the type of tear you have  Some types of meniscus tears can heal on their own  You may need any of the following:  NSAIDs , such as ibuprofen, help decrease swelling, pain, and fever  This medicine is available with or without a doctor's order  NSAIDs can cause stomach bleeding or kidney problems in certain people  If you take blood thinner medicine, always ask if NSAIDs are safe for you  Always read the medicine label and follow directions   Do not give these medicines to children younger than 6 months without direction from a healthcare provider  Rest  your knee  Avoid activities that make the swelling or pain worse  You may need to avoid putting weight on your leg while you have pain  Your healthcare provider may recommend that you use crutches  Apply ice  on your knee for 15 to 20 minutes every hour or as directed  Use an ice pack, or put crushed ice in a plastic bag  Cover it with a towel  Ice helps prevent tissue damage and decreases swelling and pain  Compress  your knee with an elastic bandage, air cast, medical boot, or splint to reduce swelling  Ask your healthcare provider which compression device to use, and how tight it should be  Elevate  your knee above the level of your heart as often as you can  This will help decrease swelling and pain  Prop your knee on pillows or blankets to keep it elevated comfortably  Surgery  may be needed if your symptoms do not improve  Your healthcare provider may trim away or repair damaged tissue  Call your local emergency number (911 in the [address]) if:   Your arm or leg feels warm, tender, and painful  It may look swollen and red  When should I seek immediate care? You cannot move your knee at all  When should I call my doctor? Your symptoms do not improve with treatment  You have questions or concerns about your condition or care  CARE AGREEMENT:   You have the right to help plan your care  Learn about your health condition and how it may be treated  Discuss treatment options with your healthcare providers to decide what care you want to receive  You always have the right to refuse treatment  The above information is an  only  It is not intended as medical advice for individual conditions or treatments  Talk to your doctor, nurse or pharmacist before following any medical regimen to see if it is safe and effective for you    © Copyright E[employer] Martinez 2022 Information is for End User's use only and may not be sold, redistributed or otherwise used for commercial purposes  Patellofemoral Pain Syndrome   WHAT YOU NEED TO KNOW:   Patellofemoral pain syndrome (PFPS) is pain in or around your patella (kneecap)  PFPS is also called runner's knee or jumper's knee and is common in athletes  Rest, ice, and elevate your knee  You may need tape or brace to support your kneecap  Wear shoe inserts as directed and go to physical therapy  DISCHARGE INSTRUCTIONS:   Call your doctor if:   You have trouble walking  You have a fever  Your knee brace or sleeve is too tight  Your symptoms are not getting better, or they get worse  Your pain and swelling increase even after you take pain medicine  You have questions or concerns about your condition or care  Manage your symptoms:       Change your activity  You may need to rest your knee  You may need to change your exercise routine to low-impact activities  Apply ice  on your knee for 15 to 20 minutes every hour or as directed  Use an ice pack, or put crushed ice in a plastic bag  Cover it with a towel before you apply it  Ice helps prevent tissue damage and decreases swelling and pain  Elevate  your knee above the level of your heart as often as you can  This will help decrease swelling and pain  Prop your leg on pillows or blankets to keep it elevated comfortably  Do not  put a pillow directly under your knee  Support  your knee by wrapping it with tape or an elastic bandage  You may need a brace for more support  This will help decrease swelling and keep your kneecap in the correct spot  Wear shoe inserts as directed  Orthotics or arch supports help keep your foot and ankle stable and in line to decrease stress on your knee  Go to physical therapy as directed  A physical therapist will teach you exercises to help improve movement and strength, and to decrease pain  Maintain a healthy weight    Ask your healthcare provider what a healthy weight is for you  Ask him or her to help you create a weight loss plan if you are overweight  Weight loss can help decrease pressure on your knee  Medicines: You may need the following:  Acetaminophen  decreases pain and fever  It is available without a doctor's order  Ask how much to take and how often to take it  Follow directions  Read the labels of all other medicines you are using to see if they also contain acetaminophen, or ask your doctor or pharmacist  Acetaminophen can cause liver damage if not taken correctly  NSAIDs , such as ibuprofen, help decrease swelling, pain, and fever  This medicine is available with or without a doctor's order  NSAIDs can cause stomach bleeding or kidney problems in certain people  If you take blood thinner medicine, always ask your healthcare provider if NSAIDs are safe for you  Always read the medicine label and follow directions  Take your medicine as directed  Contact your healthcare provider if you think your medicine is not helping or if you have side effects  Tell your provider if you are allergic to any medicine  Keep a list of the medicines, vitamins, and herbs you take  Include the amounts, and when and why you take them  Bring the list or the pill bottles to follow-up visits  Carry your medicine list with you in case of an emergency  Prevent another episode:   Wear the right shoes for your activities  Increase activity gradually  Warm up before you exercise  Stretch your leg muscles before and after activity  Follow up with your doctor as directed: You may be referred to an orthopedic surgeon  Write down your questions so you remember to ask them during your visits  © Copyright Darcy Robbins 2022 Information is for End User's use only and may not be sold, redistributed or otherwise used for commercial purposes  The above information is an  only  It is not intended as medical advice for individual conditions or treatments   Talk to your doctor, nurse or pharmacist before following any medical regimen to see if it is safe and effective for you

## 2023-06-27 ENCOUNTER — EVALUATION (OUTPATIENT)
Dept: PHYSICAL THERAPY | Facility: REHABILITATION | Age: 33
End: 2023-06-27
Payer: COMMERCIAL

## 2023-06-27 DIAGNOSIS — M22.2X2 PATELLOFEMORAL DISORDER OF LEFT KNEE: ICD-10-CM

## 2023-06-27 DIAGNOSIS — M25.562 KNEE MENISCUS PAIN, LEFT: ICD-10-CM

## 2023-06-27 DIAGNOSIS — M25.562 ACUTE PAIN OF LEFT KNEE: Primary | ICD-10-CM

## 2023-06-27 PROCEDURE — 97110 THERAPEUTIC EXERCISES: CPT | Performed by: PHYSICAL THERAPIST

## 2023-06-27 PROCEDURE — 97161 PT EVAL LOW COMPLEX 20 MIN: CPT | Performed by: PHYSICAL THERAPIST

## 2023-06-27 NOTE — PROGRESS NOTES
PT Evaluation     Today's date: 2023  Patient name: Randy Morton  : 1990  MRN: [id number]  Referring provider: BETHANIE Horton*  Dx:   Encounter Diagnosis     ICD-10-CM    1  Acute pain of left knee  M25 562 Ambulatory Referral to Physical Therapy      2  Patellofemoral disorder of left knee  M22 2X2 Ambulatory Referral to Physical Therapy      3  Knee meniscus pain, left  M25 562 Ambulatory Referral to Physical Therapy                     Assessment  Assessment details: Randy Morton is a pleasant 34 y o  female who presents with L knee pain  The patient's greatest concerns are worry over not knowing what's wrong, concern at no signs of improvement and fear of not being able to keep active  No further referral appears necessary at this time based upon examination results  Primary movement impairment diagnosis of impaired quad recruitment  Her impairments have lead to participation restrictions in sleep, standing, stairs, and walking  She would benefit from working with a skilled PT in order to increase participation in aforementioned participation restrictions  Primary Impairments :   1  Impaired quad recruitment   2  Impaired hip abductor weakness       Impairments: abnormal muscle firing, abnormal muscle tone, abnormal or restricted ROM, abnormal movement, activity intolerance, impaired physical strength, lacks appropriate home exercise program, pain with function, poor posture  and poor body mechanics    Symptom irritability: moderateUnderstanding of Dx/Px/POC: fair   Prognosis: fair  Prognosis details: Positive prognostic indicators include positive attitude toward recovery, good understanding of diagnosis and treatment plan options and absence of observed red flags  Negative prognostic indicators include minimal changes in pain with movement and multiple concurrent orthopedic problems  Goals  ST  Independent with HEP in 2 weeks  2   Pt will have verbal report of improvement in symptoms by >/=25% in 2 weeks     To be achieved by D/C   LT  Pt will improve FOTO score by >/= 9 points in 6 weeks  2  Pt will improve FOTO score to >/= 66 by visit # 11  3  Pt will be able to ascend / descend stairs with no difficulty or reproduction of sx's   4  Pt will be able to to walk >/= 10 min without reproduction of sx's   5  Pt will be able to pt will be able to sleep through the night without disturbances of knee sx's       Plan  Patient would benefit from: skilled physical therapy  Planned therapy interventions: activity modification, joint mobilization, manual therapy, motor coordination training, neuromuscular re-education, patient education, self care, therapeutic activities, therapeutic exercise, graded activity, home exercise program, behavior modification, graded exercise, functional ROM exercises and strengthening  Frequency: 2x week  Duration in weeks: 8  Plan of Care beginning date: 2023  Plan of Care expiration date: 2023  Treatment plan discussed with: patient        Subjective Evaluation    History of Present Illness  Mechanism of injury: Pt reports that she has had knee pain for about a month with insidious onset  She notes that she does have tingling in her leg but she has had that since her low back issues, but when she gets her knee pain it does increase her sx's         Pain  Current pain ratin  At best pain ratin  At worst pain rating: 10  Location: medial and lateral sides  Quality: tight and burning  Relieving factors: ice and rest  Aggravating factors: standing, walking and stair climbing  Progression: worsening    Patient Goals  Patient goals for therapy: decreased pain          Objective     Active Range of Motion   Left Knee   Flexion: 116 degrees with pain  Prone flexion: 120 degrees   Extension: -5 degrees   Extensor lag: -1 degrees     Right Knee   Flexion: 135 degrees   Prone flexion: 120 degrees   Extension: 0 degrees   Extensor la degrees     Passive Range of Motion   Left Knee   Normal passive range of motion  Flexion: 130 degrees with pain  Prone flexion: 125 degrees   Extension: WFL and with pain    Right Knee   Flexion: 140 degrees   Prone flexion: 130 degrees     Mobility   Patellar Mobility:   Left Knee   WFL: medial, lateral, superior and inferior  Right Knee   WFL: medial, lateral, superior and inferior    Strength/Myotome Testing     Left Hip   Planes of Motion   Flexion: 5  Extension: 5    Right Hip   Planes of Motion   Flexion: 5  Extension: 5    Left Knee   Flexion: 4 (pain)  Extension: 4 (pain)  Quadriceps contraction: poor    Right Knee   Normal strength  Flexion: 5  Extension: 5  Quadriceps contraction: fair    Left Ankle/Foot   Dorsiflexion: 5    Right Ankle/Foot   Dorsiflexion: 5    Functional Assessment      Squat    Left valgus, left tibial anterior translation beyond toes, right valgus and right tibial anterior translation beyond toes       Single Leg Stance   Left: 20 (pain) seconds  Right: 32 seconds             Precautions: chronic pain back and neck,       Manuals 6/27                                                                Neuro Re-Ed             Clamshells  3s 2x10 ea            S/L hip abd 3s 2x10            SLS             Heel toe walk on foam                                                     Ther Ex             Pt education on HEP, POC x8 min            Bike for ROM             SLR flex          2x5            Heel slides                           Side steps c TB                                       Ther Activity             Squats c PB                          Gait Training                                       Modalities

## 2023-09-08 ENCOUNTER — HOSPITAL ENCOUNTER (EMERGENCY)
Facility: HOSPITAL | Age: 33
Discharge: HOME | End: 2023-09-08
Attending: EMERGENCY MEDICINE

## 2023-09-08 VITALS
SYSTOLIC BLOOD PRESSURE: 116 MMHG | HEART RATE: 96 BPM | DIASTOLIC BLOOD PRESSURE: 62 MMHG | OXYGEN SATURATION: 94 % | TEMPERATURE: 99.6 F | RESPIRATION RATE: 18 BRPM

## 2023-09-08 DIAGNOSIS — B34.9 VIRAL SYNDROME: Primary | ICD-10-CM

## 2023-09-08 PROCEDURE — 99282 EMERGENCY DEPT VISIT SF MDM: CPT

## 2023-09-08 PROCEDURE — 99284 EMERGENCY DEPT VISIT MOD MDM: CPT | Performed by: EMERGENCY MEDICINE

## 2023-09-08 RX ORDER — IBUPROFEN 400 MG/1
400 TABLET ORAL ONCE
Status: COMPLETED | OUTPATIENT
Start: 2023-09-08 | End: 2023-09-08

## 2023-09-08 RX ORDER — MAGNESIUM HYDROXIDE/ALUMINUM HYDROXICE/SIMETHICONE 120; 1200; 1200 MG/30ML; MG/30ML; MG/30ML
30 SUSPENSION ORAL ONCE
Status: COMPLETED | OUTPATIENT
Start: 2023-09-08 | End: 2023-09-08

## 2023-09-08 RX ORDER — ACETAMINOPHEN 325 MG/1
650 TABLET ORAL ONCE
Status: COMPLETED | OUTPATIENT
Start: 2023-09-08 | End: 2023-09-08

## 2023-09-08 RX ORDER — ONDANSETRON 4 MG/1
4 TABLET, ORALLY DISINTEGRATING ORAL ONCE
Status: COMPLETED | OUTPATIENT
Start: 2023-09-08 | End: 2023-09-08

## 2023-09-08 RX ORDER — ONDANSETRON 4 MG/1
4 TABLET, FILM COATED ORAL EVERY 6 HOURS
Qty: 12 TABLET | Refills: 0 | Status: SHIPPED | OUTPATIENT
Start: 2023-09-08

## 2023-09-08 RX ADMIN — ONDANSETRON 4 MG: 4 TABLET, ORALLY DISINTEGRATING ORAL at 05:28

## 2023-09-08 RX ADMIN — IBUPROFEN 400 MG: 400 TABLET, FILM COATED ORAL at 05:27

## 2023-09-08 RX ADMIN — ACETAMINOPHEN 650 MG: 325 TABLET, FILM COATED ORAL at 05:27

## 2023-09-08 RX ADMIN — ALUMINUM HYDROXIDE, MAGNESIUM HYDROXIDE, AND SIMETHICONE 30 ML: 200; 200; 20 SUSPENSION ORAL at 05:26

## 2023-09-08 NOTE — ED PROVIDER NOTES
History  Chief Complaint   Patient presents with   • Flu Symptoms     Generalized flu like symptoms x2 days. Cough, subjective fever, weakness     HPI     Patient is a 70-year-old female with a past medical history of IBS who presented with fever, cough,body aches, fatigue, and abdominal pain. Patient states that her symptoms have been present for the past 2 days. She reports a fever of 104. She has been taking Tylenol with minimal relief. She has been nauseous and has vomited about 4 times. She has a sharp pain in her epigastric area that has been present after the vomiting. She has decreased oral intake. She does not have known sick contacts. She denies shortness of breath. Prior to Admission Medications   Prescriptions Last Dose Informant Patient Reported? Taking?    DICLOFENAC SODIUM ER PO   Yes No   Sig: Take by mouth   Patient not taking: Reported on 7/12/2022   DULoxetine (CYMBALTA) 30 mg delayed release capsule   Yes No   Sig: Take 30 mg by mouth daily   Patient not taking: Reported on 7/12/2022   acetaminophen (TYLENOL) 325 mg tablet  Self No No   Sig: Take 3 tablets (975 mg total) by mouth every 8 (eight) hours   Patient taking differently: Take 975 mg by mouth every 8 (eight) hours Takes sometimes   baclofen 10 mg tablet  Self No No   Sig: Take one tablet by mouth every 8 hours for back and leg spasm   Patient not taking: Reported on 2/17/2020   baclofen 20 mg tablet  Self Yes No   esomeprazole (NexIUM) 10 MG packet  Self Yes No   Sig: Take by mouth every morning before breakfast   gabapentin (NEURONTIN) 100 mg capsule  Self No No   Sig: Take 1 capsule (100 mg total) by mouth 3 (three) times a day   Patient taking differently: Take 100 mg by mouth 3 (three) times a day Rarely takes   gabapentin (NEURONTIN) 300 mg capsule  Self Yes No   Sig: TAKE 1 TABLET BY MOUTH IN THE MORNING AND TAKE 2 TABLETS AT BEDTIME   hyoscyamine (ANASPAZ,LEVSIN) 0.125 MG tablet  Self Yes No   Sig: TAKE 1 TABLET BY MOUTH 3 TIMES A DAY AS NEEDED FOR LOOSE BOWELS   Patient not taking: Reported on 2022   ibuprofen (MOTRIN) 600 mg tablet  Self Yes No   Sig: Take by mouth as needed   lidocaine (LIDODERM) 5 %   No No   Sig: Apply 1 patch topically daily Remove & Discard patch within 12 hours or as directed by MD   meloxicam (MOBIC) 15 mg tablet  Self Yes No   Sig: Take 15 mg by mouth daily   Patient not taking: Reported on 2022   naproxen (NAPROSYN) 500 mg tablet  Self Yes No   Sig: Take 500 mg by mouth 2 (two) times a day with meals Pt unsure dosage but is currently taking   naproxen (Naprosyn) 500 mg tablet   No No   Sig: Take 1 tablet (500 mg total) by mouth 2 (two) times a day with meals   omeprazole (PriLOSEC) 20 mg delayed release capsule  Self Yes No   Sig: Take 1 tablet by mouth daily   ondansetron (ZOFRAN) 4 mg tablet   No No   Sig: Take 1 tablet (4 mg total) by mouth every 6 (six) hours for 2 days   predniSONE 20 mg tablet   No No   Si tab TID x 2 days, then BID x 2 days, then daily x 2 days   Patient not taking: Reported on 2022      Facility-Administered Medications: None       Past Medical History:   Diagnosis Date   • Fibromyalgia    • GERD (gastroesophageal reflux disease)    • Obesity        Past Surgical History:   Procedure Laterality Date   • HERNIA REPAIR     • POSTERIOR LAMINECTOMY THORACIC AND LUMBAR SPINE Left 2019    Procedure: LUMBAR LAMINOTOMY/DISCECTOMY L5-S1, LEFT;  Surgeon: Marce Bruno MD;  [facility];  Service: Neurosurgery   • OR COLONOSCOPY FLX DX W/COLLJ SPEC WHEN PFRMD N/A 2018    Procedure: COLONOSCOPY;  Surgeon: Mary Sadler MD;  [facility]; Service: Gastroenterology   • OR ESOPHAGOGASTRODUODENOSCOPY TRANSORAL DIAGNOSTIC N/A 2018    Procedure: ESOPHAGOGASTRODUODENOSCOPY (EGD); Surgeon: Mary Sadler MD;  [facility];   Service: Gastroenterology   • TUBAL LIGATION         Family History   Problem Relation Age of Onset   • Diabetes Mother    • Diabetes Father    • Diabetes Sister    • Coronary artery disease Paternal Grandmother      I have reviewed and agree with the history as documented. E-Cigarette/Vaping   • E-Cigarette Use Never User      E-Cigarette/Vaping Substances     Social History     Tobacco Use   • Smoking status: Never   • Smokeless tobacco: Never   Vaping Use   • Vaping Use: Never used   Substance Use Topics   • Alcohol use: No   • Drug use: No        Review of Systems   Constitutional: Positive for chills and fever. HENT: Positive for congestion. Negative for ear pain and sore throat. Eyes: Negative for photophobia and visual disturbance. Respiratory: Positive for cough. Negative for shortness of breath. Cardiovascular: Negative for chest pain and palpitations. Gastrointestinal: Positive for abdominal pain and vomiting. Genitourinary: Negative for dysuria and hematuria. Musculoskeletal: Positive for myalgias. Negative for back pain. Skin: Negative for rash and wound. Neurological: Negative for syncope and numbness. All other systems reviewed and are negative. Physical Exam  ED Triage Vitals [09/08/23 0500]   Temperature Pulse Respirations Blood Pressure SpO2   99.6 °F (37.6 °C) (!) 112 18 116/62 94 %      Temp Source Heart Rate Source Patient Position - Orthostatic VS BP Location FiO2 (%)   Oral Monitor Sitting Right arm --      Pain Score       7             Orthostatic Vital Signs  Vitals:    09/08/23 0500 09/08/23 0609   BP: 116/62    Pulse: (!) 112 96   Patient Position - Orthostatic VS: Sitting        Physical Exam  Vitals and nursing note reviewed. Constitutional:       General: She is not in acute distress. Appearance: She is well-developed. HENT:      Head: Normocephalic and atraumatic. Nose: Congestion and rhinorrhea present. Mouth/Throat:      Mouth: Mucous membranes are moist.   Eyes:      Extraocular Movements: Extraocular movements intact.       Conjunctiva/sclera: Conjunctivae normal.   Cardiovascular:      Rate and Rhythm: Regular rhythm. Tachycardia present. Heart sounds: No murmur heard. Pulmonary:      Effort: Pulmonary effort is normal. No respiratory distress. Breath sounds: Normal breath sounds. No wheezing or rhonchi. Abdominal:      Palpations: Abdomen is soft. Tenderness: There is abdominal tenderness. There is no guarding or rebound. Comments: Mild tenderness to palpation over epigastric region    Musculoskeletal:      Cervical back: Neck supple. Right lower leg: No edema. Left lower leg: No edema. Skin:     General: Skin is warm and dry. Capillary Refill: Capillary refill takes less than 2 seconds. Neurological:      Mental Status: She is alert. Psychiatric:         Mood and Affect: Mood normal.         ED Medications  Medications   acetaminophen (TYLENOL) tablet 650 mg (650 mg Oral Given 9/8/23 0527)   ibuprofen (MOTRIN) tablet 400 mg (400 mg Oral Given 9/8/23 0527)   aluminum-magnesium hydroxide-simethicone (MAALOX) oral suspension 30 mL (30 mL Oral Given 9/8/23 0526)   ondansetron (ZOFRAN-ODT) dispersible tablet 4 mg (4 mg Oral Given 9/8/23 0528)       Diagnostic Studies  Results Reviewed     None                 No orders to display         Procedures  Procedures      ED Course                                       Medical Decision Making  Patient is a 34-year-old female with a past medical history of IBS who presented with fever, cough,body aches, fatigue, and abdominal pain. Patient's symptoms are most likely due to a viral syndrome. Less likely pneumonia given lack of hypoxia and shortness of breath. Will give patient Tylenol, Motrin, Zofran, and Maalox for symptomatic treatment. Patient is requesting a work note. Patient is able to tolerate p.o. She was told to follow-up with her PCP. She was given return precautions and discharged from the ED.     Viral syndrome: acute illness or injury  Risk  OTC drugs.  Prescription drug management. Disposition  Final diagnoses:   Viral syndrome     Time reflects when diagnosis was documented in both MDM as applicable and the Disposition within this note     Time User Action Codes Description Comment    9/8/2023  5:50 AM Doug Tee Add [B34.9] Viral syndrome       ED Disposition     ED Disposition   Discharge    Condition   Stable    Date/Time   Fri Sep 8, 2023  5:50 AM    Comment   Vinny Randolph discharge to home/self care.                Follow-up Information     Follow up With Specialties Details Why Contact Info    [facility], Nurse Practitioner   [address] [place] [address]  [phone]            Discharge Medication List as of 9/8/2023  6:03 AM      CONTINUE these medications which have CHANGED    Details   ondansetron (ZOFRAN) 4 mg tablet Take 1 tablet (4 mg total) by mouth every 6 (six) hours, Starting Fri 9/8/2023, Normal         CONTINUE these medications which have NOT CHANGED    Details   acetaminophen (TYLENOL) 325 mg tablet Take 3 tablets (975 mg total) by mouth every 8 (eight) hours, Starting Sat 2/16/2019, No Print      !! baclofen 10 mg tablet Take one tablet by mouth every 8 hours for back and leg spasm, Normal      !! baclofen 20 mg tablet Historical Med      DICLOFENAC SODIUM ER PO Take by mouth, Historical Med      DULoxetine (CYMBALTA) 30 mg delayed release capsule Take 30 mg by mouth daily, Historical Med      esomeprazole (NexIUM) 10 MG packet Take by mouth every morning before breakfast, Historical Med      !! gabapentin (NEURONTIN) 100 mg capsule Take 1 capsule (100 mg total) by mouth 3 (three) times a day, Starting Sat 2/16/2019, No Print      !! gabapentin (NEURONTIN) 300 mg capsule TAKE 1 TABLET BY MOUTH IN THE MORNING AND TAKE 2 TABLETS AT BEDTIME, Historical Med      hyoscyamine (ANASPAZ,LEVSIN) 0.125 MG tablet TAKE 1 TABLET BY MOUTH 3 TIMES A DAY AS NEEDED FOR LOOSE BOWELS, Historical Med ibuprofen (MOTRIN) 600 mg tablet Take by mouth as needed, Starting Mon 4/4/2016, Historical Med      lidocaine (LIDODERM) 5 % Apply 1 patch topically daily Remove & Discard patch within 12 hours or as directed by MD, Starting Tue 4/19/2022, Normal      meloxicam (MOBIC) 15 mg tablet Take 15 mg by mouth daily, Historical Med      !! naproxen (NAPROSYN) 500 mg tablet Take 500 mg by mouth 2 (two) times a day with meals Pt unsure dosage but is currently taking, Historical Med      !! naproxen (Naprosyn) 500 mg tablet Take 1 tablet (500 mg total) by mouth 2 (two) times a day with meals, Starting Sat 12/3/2022, Normal      omeprazole (PriLOSEC) 20 mg delayed release capsule Take 1 tablet by mouth daily, Starting Tue 2/28/2017, Historical Med      predniSONE 20 mg tablet 1 tab TID x 2 days, then BID x 2 days, then daily x 2 days, Normal       !! - Potential duplicate medications found. Please discuss with provider. No discharge procedures on file. PDMP Review     None           ED Provider  Attending physically available and evaluated Osman Anderson. I managed the patient along with the ED Attending.     Electronically Signed by         Key Cruz MD  09/08/23 0028

## 2023-09-08 NOTE — ED ATTENDING ATTESTATION
9/8/2023  IRey MD, saw and evaluated the patient. I have discussed the patient with the resident/non-physician practitioner and agree with the resident's/non-physician practitioner's findings, Plan of Care, and MDM as documented in the resident's/non-physician practitioner's note, except where noted. All available labs and Radiology studies were reviewed. I was present for key portions of any procedure(s) performed by the resident/non-physician practitioner and I was immediately available to provide assistance. At this point I agree with the current assessment done in the Emergency Department. I have conducted an independent evaluation of this patient a history and physical is as follows:    ED Course  ED Course as of 09/08/23 0607   [facility] Sep 08, 2023   0517 Per resident h&p 29 YO female presents for chills; fevers; congestion O: P 112; abd soft; I/P acute viral syndrome; symptomatic treatment for nausea     Emergency Department Note- Ab Mccarthy 28 y.o. female MRN: [id number]    Unit/Bed#: ED 01 Encounter: [id number]    Ab Mccarthy is a 28 y.o. female who presents with   Chief Complaint   Patient presents with   • Flu Symptoms     Generalized flu like symptoms x2 days. Cough, subjective fever, weakness         History of Present Illness   HPI:  Ab Mccarthy is a 28 y.o. female who presents for evaluation of: Body aches, fevers, chills, fatigue over the last 2 days. Patient denies sick contacts at home. Patient has been taking acetaminophen or Motrin at home to treat her discomfort. Patient has not actually taken her temperature she is just felt warm. She also notes nasal congestion and nasal discharge. Review of Systems   Constitutional: Positive for chills, fatigue and fever. HENT: Positive for congestion and rhinorrhea. Negative for sore throat. Respiratory: Negative for cough and shortness of breath. Cardiovascular: Negative for chest pain and palpitations. Gastrointestinal: Negative for abdominal pain and nausea. Genitourinary: Negative for flank pain and frequency. Neurological: Negative for light-headedness and headaches. Psychiatric/Behavioral: Negative for dysphoric mood and hallucinations. All other systems reviewed and are negative. Historical Information   Past Medical History:   Diagnosis Date   • Fibromyalgia    • GERD (gastroesophageal reflux disease)    • Obesity      Past Surgical History:   Procedure Laterality Date   • HERNIA REPAIR     • POSTERIOR LAMINECTOMY THORACIC AND LUMBAR SPINE Left 2/13/2019    Procedure: LUMBAR LAMINOTOMY/DISCECTOMY L5-S1, LEFT;  Surgeon: Maico Marley MD;  [facility];  Service: Neurosurgery   • [place] COLONOSCOPY FLX DX W/COLLJ SPEC WHEN PFRMD N/A 12/20/2018    Procedure: COLONOSCOPY;  Surgeon: Jamie Kramer MD;  [facility]; Service: Gastroenterology   • [place] ESOPHAGOGASTRODUODENOSCOPY TRANSORAL DIAGNOSTIC N/A 12/20/2018    Procedure: ESOPHAGOGASTRODUODENOSCOPY (EGD); Surgeon: Jamie Kramer MD;  [facility]; Service: Gastroenterology   • TUBAL LIGATION       Social History   Social History     Substance and Sexual Activity   Alcohol Use No     Social History     Substance and Sexual Activity   Drug Use No     Social History     Tobacco Use   Smoking Status Never   Smokeless Tobacco Never     Family History:   Family History   Problem Relation Age of Onset   • Diabetes Mother    • Diabetes Father    • Diabetes Sister    • Coronary artery disease Paternal Grandmother        Meds/Allergies   PTA meds:   Prior to Admission Medications   Prescriptions Last Dose Informant Patient Reported? Taking?    DICLOFENAC SODIUM ER PO   Yes No   Sig: Take by mouth   Patient not taking: Reported on 7/12/2022   DULoxetine (CYMBALTA) 30 mg delayed release capsule   Yes No   Sig: Take 30 mg by mouth daily   Patient not taking: Reported on 7/12/2022   acetaminophen (TYLENOL) 325 mg tablet  Self No No Sig: Take 3 tablets (975 mg total) by mouth every 8 (eight) hours   Patient taking differently: Take 975 mg by mouth every 8 (eight) hours Takes sometimes   baclofen 10 mg tablet  Self No No   Sig: Take one tablet by mouth every 8 hours for back and leg spasm   Patient not taking: Reported on 2020   baclofen 20 mg tablet  Self Yes No   esomeprazole (NexIUM) 10 MG packet  Self Yes No   Sig: Take by mouth every morning before breakfast   gabapentin (NEURONTIN) 100 mg capsule  Self No No   Sig: Take 1 capsule (100 mg total) by mouth 3 (three) times a day   Patient taking differently: Take 100 mg by mouth 3 (three) times a day Rarely takes   gabapentin (NEURONTIN) 300 mg capsule  Self Yes No   Sig: TAKE 1 TABLET BY MOUTH IN THE MORNING AND TAKE 2 TABLETS AT BEDTIME   hyoscyamine (ANASPAZ,LEVSIN) 0.125 MG tablet  Self Yes No   Sig: TAKE 1 TABLET BY MOUTH 3 TIMES A DAY AS NEEDED FOR LOOSE BOWELS   Patient not taking: Reported on 2022   ibuprofen (MOTRIN) 600 mg tablet  Self Yes No   Sig: Take by mouth as needed   lidocaine (LIDODERM) 5 %   No No   Sig: Apply 1 patch topically daily Remove & Discard patch within 12 hours or as directed by MD   meloxicam (MOBIC) 15 mg tablet  Self Yes No   Sig: Take 15 mg by mouth daily   Patient not taking: Reported on 2022   naproxen (NAPROSYN) 500 mg tablet  Self Yes No   Sig: Take 500 mg by mouth 2 (two) times a day with meals Pt unsure dosage but is currently taking   naproxen (Naprosyn) 500 mg tablet   No No   Sig: Take 1 tablet (500 mg total) by mouth 2 (two) times a day with meals   omeprazole (PriLOSEC) 20 mg delayed release capsule  Self Yes No   Sig: Take 1 tablet by mouth daily   ondansetron (ZOFRAN) 4 mg tablet   No No   Sig: Take 1 tablet (4 mg total) by mouth every 6 (six) hours for 2 days   predniSONE 20 mg tablet   No No   Si tab TID x 2 days, then BID x 2 days, then daily x 2 days   Patient not taking: Reported on 2022      Facility-Administered Medications: None     Allergies   Allergen Reactions   • Septra [Sulfamethoxazole-Trimethoprim] Rash and Shortness Of Breath   • Ciprofloxacin Hcl    • Ciprofloxacin Rash   • Penicillins Rash   • Zosyn [Piperacillin Sod-Tazobactam So] Hives and Rash       Objective   First Vitals:   Blood Pressure: 116/62 (09/08/23 0500)  Pulse: (!) 112 (09/08/23 0500)  Temperature: 99.6 °F (37.6 °C) (09/08/23 0500)  Temp Source: Oral (09/08/23 0500)  Respirations: 18 (09/08/23 0500)  SpO2: 94 % (09/08/23 0500)    Current Vitals:   Blood Pressure: 116/62 (09/08/23 0500)  Pulse: (!) 112 (09/08/23 0500)  Temperature: 99.6 °F (37.6 °C) (09/08/23 0500)  Temp Source: Oral (09/08/23 0500)  Respirations: 18 (09/08/23 0500)  SpO2: 94 % (09/08/23 0500)    No intake or output data in the 24 hours ending 09/08/23 0607    Invasive Devices     None                 Physical Exam  Vitals and nursing note reviewed. Constitutional:       General: She is not in acute distress. Appearance: Normal appearance. She is well-developed. HENT:      Head: Normocephalic and atraumatic. Right Ear: External ear normal.      Left Ear: External ear normal.      Nose: Nose normal.      Mouth/Throat:      Pharynx: No oropharyngeal exudate. Eyes:      Conjunctiva/sclera: Conjunctivae normal.      Pupils: Pupils are equal, round, and reactive to light. Cardiovascular:      Rate and Rhythm: Normal rate and regular rhythm. Pulmonary:      Effort: Pulmonary effort is normal. No respiratory distress. Abdominal:      General: Abdomen is flat. There is no distension. Palpations: Abdomen is soft. Musculoskeletal:         General: No deformity. Normal range of motion. Cervical back: Normal range of motion and neck supple. Skin:     General: Skin is warm and dry. Capillary Refill: Capillary refill takes less than 2 seconds. Neurological:      General: No focal deficit present.       Mental Status: She is alert and oriented to person, place, and time. Mental status is at baseline. Coordination: Coordination normal.   Psychiatric:         Mood and Affect: Mood normal.         Behavior: Behavior normal.         Thought Content: Thought content normal.         Judgment: Judgment normal.           Medical Decision Makin. Acute viral URI: Symptomatic treatment; follow-up with PCP. Acetaminophen for fevers. No results found for this or any previous visit (from the past 36 hour(s)). No orders to display         Portions of the record may have been created with voice recognition software. Occasional wrong word or "sound a like" substitutions may have occurred due to the inherent limitations of voice recognition software. Read the chart carefully and recognize, using context, where substitutions have occurred.         Critical Care Time  Procedures

## 2023-09-08 NOTE — Clinical Note
Lynne Rowan was seen and treated in our emergency department on 9/8/2023. Diagnosis:     Nikiata John  . She may return on this date: 09/11/2023         If you have any questions or concerns, please don't hesitate to call.       Ambrosio Cruz MD    ______________________________           _______________          _______________  Hospital Representative                              Date                                Time

## 2023-09-08 NOTE — DISCHARGE INSTRUCTIONS
You were seen in the Emergency Department today for viral syndrome. Please follow up with your primary care doctor in 1-2 days. Please return to the Emergency Department if you experience worsening of your current symptoms, chest pain, shortness of breath, or any other concerning symptoms.

## 2023-11-23 ENCOUNTER — HOSPITAL ENCOUNTER (EMERGENCY)
Facility: HOSPITAL | Age: 33
Discharge: HOME | End: 2023-11-23
Attending: EMERGENCY MEDICINE

## 2023-11-23 VITALS
SYSTOLIC BLOOD PRESSURE: 159 MMHG | RESPIRATION RATE: 15 BRPM | DIASTOLIC BLOOD PRESSURE: 111 MMHG | OXYGEN SATURATION: 93 % | HEART RATE: 114 BPM

## 2023-11-23 DIAGNOSIS — R55 NEAR SYNCOPE: Primary | ICD-10-CM

## 2023-11-23 PROCEDURE — 93005 ELECTROCARDIOGRAM TRACING: CPT

## 2023-11-23 PROCEDURE — 99284 EMERGENCY DEPT VISIT MOD MDM: CPT | Performed by: EMERGENCY MEDICINE

## 2023-11-23 PROCEDURE — 99283 EMERGENCY DEPT VISIT LOW MDM: CPT
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
## 2023-11-23 NOTE — ED PROVIDER NOTES
History  Chief Complaint   Patient presents with    Syncope     Pt received bad news due to a death in a family and had a near syncopal episode in the family waiting room     HPI    Patient is a 74-year-old female who presents for a near syncopal event after finding out her son has . Patient's son was a level a trauma in the ED and when given the update on her son she had a near syncopal event. She was caught by her relative who lowered her to the ground. She denies chest pain and shortness of breath. Patient is providing limited history at this time. Prior to Admission Medications   Prescriptions Last Dose Informant Patient Reported? Taking?    DICLOFENAC SODIUM ER PO   Yes No   Sig: Take by mouth   Patient not taking: Reported on 2022   DULoxetine (CYMBALTA) 30 mg delayed release capsule   Yes No   Sig: Take 30 mg by mouth daily   Patient not taking: Reported on 2022   acetaminophen (TYLENOL) 325 mg tablet  Self No No   Sig: Take 3 tablets (975 mg total) by mouth every 8 (eight) hours   Patient taking differently: Take 975 mg by mouth every 8 (eight) hours Takes sometimes   baclofen 10 mg tablet  Self No No   Sig: Take one tablet by mouth every 8 hours for back and leg spasm   Patient not taking: Reported on 2020   baclofen 20 mg tablet  Self Yes No   esomeprazole (NexIUM) 10 MG packet  Self Yes No   Sig: Take by mouth every morning before breakfast   gabapentin (NEURONTIN) 100 mg capsule  Self No No   Sig: Take 1 capsule (100 mg total) by mouth 3 (three) times a day   Patient taking differently: Take 100 mg by mouth 3 (three) times a day Rarely takes   gabapentin (NEURONTIN) 300 mg capsule  Self Yes No   Sig: TAKE 1 TABLET BY MOUTH IN THE MORNING AND TAKE 2 TABLETS AT BEDTIME   hyoscyamine (ANASPAZ,LEVSIN) 0.125 MG tablet  Self Yes No   Sig: TAKE 1 TABLET BY MOUTH 3 TIMES A DAY AS NEEDED FOR LOOSE BOWELS   Patient not taking: Reported on 2022   ibuprofen (MOTRIN) 600 mg tablet  Self Yes No   Sig: Take by mouth as needed   lidocaine (LIDODERM) 5 %   No No   Sig: Apply 1 patch topically daily Remove & Discard patch within 12 hours or as directed by MD   meloxicam (MOBIC) 15 mg tablet  Self Yes No   Sig: Take 15 mg by mouth daily   Patient not taking: Reported on 2022   naproxen (NAPROSYN) 500 mg tablet  Self Yes No   Sig: Take 500 mg by mouth 2 (two) times a day with meals Pt unsure dosage but is currently taking   naproxen (Naprosyn) 500 mg tablet   No No   Sig: Take 1 tablet (500 mg total) by mouth 2 (two) times a day with meals   omeprazole (PriLOSEC) 20 mg delayed release capsule  Self Yes No   Sig: Take 1 tablet by mouth daily   ondansetron (ZOFRAN) 4 mg tablet   No No   Sig: Take 1 tablet (4 mg total) by mouth every 6 (six) hours for 2 days   ondansetron (ZOFRAN) 4 mg tablet   No No   Sig: Take 1 tablet (4 mg total) by mouth every 6 (six) hours   predniSONE 20 mg tablet   No No   Si tab TID x 2 days, then BID x 2 days, then daily x 2 days   Patient not taking: Reported on 2022      Facility-Administered Medications: None       Past Medical History:   Diagnosis Date    Fibromyalgia     GERD (gastroesophageal reflux disease)     Obesity        Past Surgical History:   Procedure Laterality Date    HERNIA REPAIR      POSTERIOR LAMINECTOMY THORACIC AND LUMBAR SPINE Left 2019    Procedure: LUMBAR LAMINOTOMY/DISCECTOMY L5-S1, LEFT;  Surgeon: Jb Fox MD;  Location: [facility];  Service: Neurosurgery    AL COLONOSCOPY FLX DX W/COLLJ SPEC WHEN PFRMD N/A 2018    Procedure: COLONOSCOPY;  Surgeon: Chantel Medina MD;  Location: [facility]; Service: Gastroenterology    AL ESOPHAGOGASTRODUODENOSCOPY TRANSORAL DIAGNOSTIC N/A 2018    Procedure: ESOPHAGOGASTRODUODENOSCOPY (EGD); Surgeon: Chantel Medina MD;  Location: [facility];   Service: Gastroenterology    TUBAL LIGATION         Family History   Problem Relation Age of Onset    Diabetes Mother     Diabetes Father Diabetes Sister     Coronary artery disease Paternal Grandmother      I have reviewed and agree with the history as documented. E-Cigarette/Vaping    E-Cigarette Use Never User      E-Cigarette/Vaping Substances     Social History     Tobacco Use    Smoking status: Never    Smokeless tobacco: Never   Vaping Use    Vaping Use: Never used   Substance Use Topics    Alcohol use: No    Drug use: No        Review of Systems   Unable to perform ROS: Other       Physical Exam  ED Triage Vitals [11/23/23 1413]   Temp Pulse Respirations Blood Pressure SpO2   -- (!) 112 20 (!) 153/115 95 %      Temp src Heart Rate Source Patient Position - Orthostatic VS BP Location FiO2 (%)   -- Monitor Sitting Right arm --      Pain Score       --             Orthostatic Vital Signs  Vitals:    11/23/23 1413 11/23/23 1515   BP: (!) 153/115 (!) 159/111   Pulse: (!) 112 (!) 114   Patient Position - Orthostatic VS: Sitting Lying       Physical Exam  HENT:      Head: Normocephalic and atraumatic. Nose: Congestion and rhinorrhea present. Mouth/Throat:      Mouth: Mucous membranes are moist.   Eyes:      Extraocular Movements: Extraocular movements intact. Pupils: Pupils are equal, round, and reactive to light. Cardiovascular:      Rate and Rhythm: Regular rhythm. Tachycardia present. Pulmonary:      Breath sounds: Normal breath sounds. No wheezing. Abdominal:      Palpations: Abdomen is soft. Tenderness: There is no abdominal tenderness. Musculoskeletal:      Right lower leg: No edema. Left lower leg: No edema. Skin:     General: Skin is warm and dry. Capillary Refill: Capillary refill takes less than 2 seconds. Neurological:      Mental Status: She is alert.    Psychiatric:         Mood and Affect: Mood normal.         ED Medications  Medications - No data to display    Diagnostic Studies  Results Reviewed       None                   No orders to display         Procedures  ECG 12 Lead Documentation Only    Date/Time: 2023 2:55 PM    Performed by: Ag Silva MD  Authorized by: Ag Silva MD    ECG reviewed by me, the ED Provider: yes    Patient location:  ED  Previous ECG:     Previous ECG:  Compared to current    Similarity:  Changes noted  Interpretation:     Interpretation: abnormal    Rate:     ECG rate:  112    ECG rate assessment: tachycardic    Rhythm:     Rhythm: sinus rhythm    Ectopy:     Ectopy: none    QRS:     QRS axis:  Normal    QRS intervals:  Normal        ED Course                                       Medical Decision Making  Patient is a 78-year-old female who presents for a syncopal event after finding out her son has . Patient had a near syncopal event following the death of her son. Most likely grief reaction. Patient denies chest pain shortness of breath. Will order EKG to evaluate for arrhythmia. Patient was offered Ativan but declined. EKG shows sinus tachycardia. Will reevaluate patient. On reevaluation, patient requesting to go home. She is awake and alert. She is able to ambulate. She was told to follow-up with her PCP. She was given return precautions discharged to the ED. Disposition  Final diagnoses:   Near syncope     Time reflects when diagnosis was documented in both MDM as applicable and the Disposition within this note       Time User Action Codes Description Comment    2023  5:03 PM Ag Silva Add [R55] Near syncope           ED Disposition       ED Disposition   Discharge    Condition   Stable    Date/Time   Thu 2023  5:03 PM    Comment   Sravan Lights discharge to home/self care.                    Follow-up Information       Follow up With Specialties Details Why Contact Info    [facility], Nurse Practitioner   [id number] [place] [address]  [phone]              Discharge Medication List as of 2023  5:03 PM        CONTINUE these medications which have NOT CHANGED    Details acetaminophen (TYLENOL) 325 mg tablet Take 3 tablets (975 mg total) by mouth every 8 (eight) hours, Starting Sat 2/16/2019, No Print      !! baclofen 10 mg tablet Take one tablet by mouth every 8 hours for back and leg spasm, Normal      !! baclofen 20 mg tablet Historical Med      DICLOFENAC SODIUM ER PO Take by mouth, Historical Med      DULoxetine (CYMBALTA) 30 mg delayed release capsule Take 30 mg by mouth daily, Historical Med      esomeprazole (NexIUM) 10 MG packet Take by mouth every morning before breakfast, Historical Med      !! gabapentin (NEURONTIN) 100 mg capsule Take 1 capsule (100 mg total) by mouth 3 (three) times a day, Starting Sat 2/16/2019, No Print      !! gabapentin (NEURONTIN) 300 mg capsule TAKE 1 TABLET BY MOUTH IN THE MORNING AND TAKE 2 TABLETS AT BEDTIME, Historical Med      hyoscyamine (ANASPAZ,LEVSIN) 0.125 MG tablet TAKE 1 TABLET BY MOUTH 3 TIMES A DAY AS NEEDED FOR LOOSE BOWELS, Historical Med      ibuprofen (MOTRIN) 600 mg tablet Take by mouth as needed, Starting Mon 4/4/2016, Historical Med      lidocaine (LIDODERM) 5 % Apply 1 patch topically daily Remove & Discard patch within 12 hours or as directed by MD, Starting Tue 4/19/2022, Normal      meloxicam (MOBIC) 15 mg tablet Take 15 mg by mouth daily, Historical Med      !! naproxen (NAPROSYN) 500 mg tablet Take 500 mg by mouth 2 (two) times a day with meals Pt unsure dosage but is currently taking, Historical Med      !! naproxen (Naprosyn) 500 mg tablet Take 1 tablet (500 mg total) by mouth 2 (two) times a day with meals, Starting Sat 12/3/2022, Normal      omeprazole (PriLOSEC) 20 mg delayed release capsule Take 1 tablet by mouth daily, Starting Tue 2/28/2017, Historical Med      ondansetron (ZOFRAN) 4 mg tablet Take 1 tablet (4 mg total) by mouth every 6 (six) hours, Starting Fri 9/8/2023, Normal      predniSONE 20 mg tablet 1 tab TID x 2 days, then BID x 2 days, then daily x 2 days, Normal       !! - Potential duplicate medications found. Please discuss with provider. No discharge procedures on file. PDMP Review       None             ED Provider  Attending physically available and evaluated Kylah Pyle. I managed the patient along with the ED Attending.     Electronically Signed by           Phil Wadsworth MD  11/25/23 2849

## 2023-11-23 NOTE — DISCHARGE INSTRUCTIONS
Please return to the Emergency Department if you experience worsening of your current symptoms or any other concerning symptoms.

## 2023-11-26 LAB
ATRIAL RATE: 107 BPM
P AXIS: 62 DEGREES
PR INTERVAL: 148 MS
QRS AXIS: 46 DEGREES
QRSD INTERVAL: 86 MS
QT INTERVAL: 304 MS
QTC INTERVAL: 405 MS
T WAVE AXIS: 26 DEGREES
VENTRICULAR RATE: 107 BPM

## 2024-09-03 ENCOUNTER — EVALUATION (OUTPATIENT)
Dept: PHYSICAL THERAPY | Age: 34
End: 2024-09-03
Payer: COMMERCIAL

## 2024-09-03 DIAGNOSIS — M54.16 LUMBAR RADICULOPATHY: Primary | ICD-10-CM

## 2024-09-03 PROCEDURE — 97110 THERAPEUTIC EXERCISES: CPT | Performed by: PHYSICAL THERAPIST

## 2024-09-03 PROCEDURE — 97161 PT EVAL LOW COMPLEX 20 MIN: CPT | Performed by: PHYSICAL THERAPIST

## 2024-09-03 NOTE — PROGRESS NOTES
PT Evaluation     Today's date: 9/3/2024  Patient name: Gisela Nguyen  : 1990  MRN: [id number]  Referring provider: Carolyne Chapman CRNP  Dx:   Encounter Diagnosis     ICD-10-CM    1. Lumbar radiculopathy  M54.16           Start Time: 1000  Stop Time: 1045  Total time in clinic (min): 45 minutes    Assessment  Impairments: abnormal coordination, abnormal muscle tone, abnormal or restricted ROM, activity intolerance, impaired physical strength, lacks appropriate home exercise program and pain with function    Assessment details: Pt reports to PT with cc of lumbar pain that has worsened over last 5+ years, with surgery in 2019. Pt reports difficulty with ADLs including prolonged walking and sitting, bending and lifting. Pt would benefit from skilled PT to improve function with ADLs.     Goals  In 4 weeks pt will:  -Be independent with phase I of HEP  -Increase LE strength by 1/2 grade  -Increase Lumber ROM by 25%    By discharge pt will:  -Be independent with Phase II of HEP  -Demonstrate full LE strength  -Demonstrate full Lumbar ROM  -Report minimal pain with ADLs    Plan  Patient would benefit from: skilled physical therapy    Planned therapy interventions: abdominal trunk stabilization, joint mobilization, manual therapy, neuromuscular re-education, strengthening, stretching, therapeutic activities, therapeutic exercise, functional ROM exercises and home exercise program    Frequency: 1x week  Plan of Care beginning date: 9/3/2024  Plan of Care expiration date: 10/15/2024        Subjective    Objective     Active Range of Motion     Additional Active Range of Motion Details  Lumbar ROM as % of normal ROM    Flex:50  Ext:50  R ROT:50  L ROT:50  R SB:50  L SB:50    Strength/Myotome Testing     Left Knee   Extension: 4    Right Knee   Extension: 4    Left Ankle/Foot   Dorsiflexion: 4  Plantar flexion: 4    Right Ankle/Foot   Dorsiflexion: 4  Plantar flexion: 4    Tests     Left Pelvic Girdle/Sacrum    Positive: active SLR test.     Right Pelvic Girdle/Sacrum   Negative: active SLR test.              Precautions: N/A      Manuals                                                                 Neuro Re-Ed                                                                                                        Ther Ex             Prone lying 15'                                                                             HEP-prone on elbows 10'                         Ther Activity                                       Gait Training                                       Modalities              10'

## 2024-09-10 ENCOUNTER — OFFICE VISIT (OUTPATIENT)
Dept: PHYSICAL THERAPY | Age: 34
End: 2024-09-10
Payer: COMMERCIAL

## 2024-09-10 DIAGNOSIS — M54.16 LUMBAR RADICULOPATHY: Primary | ICD-10-CM

## 2024-09-10 PROCEDURE — 97140 MANUAL THERAPY 1/> REGIONS: CPT | Performed by: PHYSICAL THERAPIST

## 2024-09-10 PROCEDURE — 97110 THERAPEUTIC EXERCISES: CPT | Performed by: PHYSICAL THERAPIST

## 2024-09-10 NOTE — PROGRESS NOTES
Daily Note     Today's date: 9/10/2024  Patient name: Gisela Nguyen  : 1990  MRN: [id number]  Referring provider: Carolyne Chapman CRNP  Dx:   Encounter Diagnosis     ICD-10-CM    1. Lumbar radiculopathy  M54.16                      Subjective: No major changes       Objective: See treatment diary below      Assessment: Tolerated treatment well. Patient demonstrated fatigue post treatment, would benefit from continued PT, and pt continues to have pain into LLE , will begin side glide for HEP       Plan: Continue per plan of care.  Progress treatment as tolerated.       Precautions: N/A      Manuals  9/10           PA mobs                                                    Neuro Re-Ed                                                                                                        Ther Ex             Prone lying 15' 15'           Side glide-L closing  2x10                                                               HEP-prone on elbows 10'                         Ther Activity                                       Gait Training                                       Modalities              10'

## 2024-09-16 ENCOUNTER — OFFICE VISIT (OUTPATIENT)
Dept: PHYSICAL THERAPY | Age: 34
End: 2024-09-16
Payer: COMMERCIAL

## 2024-09-16 DIAGNOSIS — M54.16 LUMBAR RADICULOPATHY: Primary | ICD-10-CM

## 2024-09-16 PROCEDURE — 97140 MANUAL THERAPY 1/> REGIONS: CPT

## 2024-09-16 PROCEDURE — 97110 THERAPEUTIC EXERCISES: CPT

## 2024-09-16 NOTE — PROGRESS NOTES
Daily Note     Today's date: 2024  Patient name: Gisela Nguyen  : 1990  MRN: [id number]  Referring provider: Carolyne Chapman CRNP  Dx:   Encounter Diagnosis     ICD-10-CM    1. Lumbar radiculopathy  M54.16           Start Time: 1005  Stop Time: 1044  Total time in clinic (min): 39 minutes    Subjective: Arrived 5 mins late, accommodated. Notes that she felt a little bit of discomfort following last session. Does note that her overall symptoms haven't changed significantly.  Does note that her symptoms have been solely located in her lower back - no radiating symptoms.       Objective: See treatment diary below      Assessment: Tolerated treatment well. With seemingly favorable centralization response, continued with sideglide and extension based activities. With Gisela noting discomfort in her musculature and spasms in general over the past couple weeks, trialed some soft tissue mobilization to this musculature with good response overall. Education about performing EIS as symptoms continue to centralize - and to stop performing if any peripheral symptoms occur, patient verbalized good understanding. Patient demonstrated fatigue post treatment, exhibited good technique with therapeutic exercises, and would benefit from continued PT      Plan: Continue per plan of care.  Progress treatment as tolerated.       Precautions: N/A      Manuals  9/10 9/16           PA mobs             Paraspinal STM                                         Neuro Re-Ed                                                                                                        Ther Ex             Prone lying 15' 15' 15'           Side glide-L closing  2x10 4x10 at wall          EIS    End of session x10                                                 HEP-prone on elbows 10'            Education              Ther Activity                                       Gait Training                                       Modalities               10'  10'  while in prone

## 2024-09-23 ENCOUNTER — APPOINTMENT (OUTPATIENT)
Dept: PHYSICAL THERAPY | Age: 34
End: 2024-09-23
Payer: COMMERCIAL

## 2024-09-26 ENCOUNTER — APPOINTMENT (OUTPATIENT)
Dept: PHYSICAL THERAPY | Age: 34
End: 2024-09-26
Payer: COMMERCIAL

## 2024-09-30 ENCOUNTER — OFFICE VISIT (OUTPATIENT)
Dept: PHYSICAL THERAPY | Age: 34
End: 2024-09-30
Payer: COMMERCIAL

## 2024-09-30 DIAGNOSIS — M54.16 LUMBAR RADICULOPATHY: Primary | ICD-10-CM

## 2024-09-30 PROCEDURE — 97110 THERAPEUTIC EXERCISES: CPT | Performed by: PHYSICAL THERAPIST

## 2024-09-30 PROCEDURE — 97140 MANUAL THERAPY 1/> REGIONS: CPT | Performed by: PHYSICAL THERAPIST

## 2024-09-30 NOTE — PROGRESS NOTES
Daily Note     Today's date: 2024  Patient name: Gisela Nguyen  : 1990  MRN: [id number]  Referring provider: Carolyne Chapman CRNP  Dx:   Encounter Diagnosis     ICD-10-CM    1. Lumbar radiculopathy  M54.16                      Subjective: some increase in lumbar pain, may be due to babysitting       Objective: See treatment diary below      Assessment: Tolerated treatment well. Patient demonstrated fatigue post treatment, would benefit from continued PT, and pt's radicular symptoms continue to decrease in frequency      Plan: Continue per plan of care.  Progress treatment as tolerated.       Precautions: N/A      Manuals  9/10 9/16  9/30         PA mobs             Paraspinal STM                                         Neuro Re-Ed                                                                                                        Ther Ex             Prone lying 15' 15' 15'  15'         Side glide-L closing  2x10 4x10 at wall 4x10 at wall         EIS    End of session x10                                                 HEP-prone on elbows 10'            Education              Ther Activity                                       Gait Training                                       Modalities              10'  10'  while in prone

## 2024-10-01 ENCOUNTER — HOSPITAL ENCOUNTER (EMERGENCY)
Facility: HOSPITAL | Age: 34
Discharge: HOME | End: 2024-10-01
Attending: EMERGENCY MEDICINE | Admitting: EMERGENCY MEDICINE
Payer: COMMERCIAL

## 2024-10-01 ENCOUNTER — HOSPITAL ENCOUNTER (OUTPATIENT)
Dept: ULTRASOUND IMAGING | Facility: CLINIC | Age: 34
Discharge: HOME | End: 2024-10-01
Payer: COMMERCIAL

## 2024-10-01 ENCOUNTER — APPOINTMENT (EMERGENCY)
Dept: RADIOLOGY | Facility: HOSPITAL | Age: 34
End: 2024-10-01
Payer: COMMERCIAL

## 2024-10-01 ENCOUNTER — HOSPITAL ENCOUNTER (OUTPATIENT)
Dept: MAMMOGRAPHY | Facility: CLINIC | Age: 34
Discharge: HOME | End: 2024-10-01
Payer: COMMERCIAL

## 2024-10-01 VITALS
HEART RATE: 83 BPM | RESPIRATION RATE: 18 BRPM | DIASTOLIC BLOOD PRESSURE: 100 MMHG | TEMPERATURE: 97.7 F | SYSTOLIC BLOOD PRESSURE: 144 MMHG | OXYGEN SATURATION: 96 %

## 2024-10-01 VITALS — BODY MASS INDEX: 38.83 KG/M2 | HEIGHT: 58 IN | WEIGHT: 185 LBS

## 2024-10-01 DIAGNOSIS — N64.4 MASTODYNIA: ICD-10-CM
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
## 2024-10-01 LAB
ATRIAL RATE: 82 BPM
P AXIS: 49 DEGREES
PR INTERVAL: 152 MS
QRS AXIS: 66 DEGREES
QRSD INTERVAL: 84 MS
QT INTERVAL: 384 MS
QTC INTERVAL: 448 MS
T WAVE AXIS: 14 DEGREES
VENTRICULAR RATE: 82 BPM

## 2024-10-01 PROCEDURE — 73564 X-RAY EXAM KNEE 4 OR MORE: CPT

## 2024-10-01 PROCEDURE — 72125 CT NECK SPINE W/O DYE: CPT

## 2024-10-01 PROCEDURE — 76642 ULTRASOUND BREAST LIMITED: CPT

## 2024-10-01 PROCEDURE — 72131 CT LUMBAR SPINE W/O DYE: CPT

## 2024-10-01 PROCEDURE — 77066 DX MAMMO INCL CAD BI: CPT

## 2024-10-01 PROCEDURE — G0279 TOMOSYNTHESIS, MAMMO: HCPCS

## 2024-10-01 PROCEDURE — 93005 ELECTROCARDIOGRAM TRACING: CPT

## 2024-10-01 PROCEDURE — 99284 EMERGENCY DEPT VISIT MOD MDM: CPT

## 2024-10-01 PROCEDURE — 93010 ELECTROCARDIOGRAM REPORT: CPT | Performed by: INTERNAL MEDICINE

## 2024-10-01 PROCEDURE — 99284 EMERGENCY DEPT VISIT MOD MDM: CPT | Performed by: EMERGENCY MEDICINE

## 2024-10-01 RX ORDER — IBUPROFEN 400 MG/1
400 TABLET, FILM COATED ORAL ONCE
Status: COMPLETED | OUTPATIENT
Start: 2024-10-01 | End: 2024-10-01

## 2024-10-01 RX ORDER — ACETAMINOPHEN 325 MG/1
975 TABLET ORAL ONCE
Status: COMPLETED | OUTPATIENT
Start: 2024-10-01 | End: 2024-10-01

## 2024-10-01 RX ADMIN — IBUPROFEN 400 MG: 400 TABLET, FILM COATED ORAL at 14:46

## 2024-10-01 RX ADMIN — ACETAMINOPHEN 975 MG: 325 TABLET ORAL at 14:46
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
## 2024-10-08 ENCOUNTER — OFFICE VISIT (OUTPATIENT)
Dept: PHYSICAL THERAPY | Age: 34
End: 2024-10-08
Payer: COMMERCIAL

## 2024-10-08 DIAGNOSIS — M54.16 LUMBAR RADICULOPATHY: Primary | ICD-10-CM

## 2024-10-08 PROCEDURE — 97140 MANUAL THERAPY 1/> REGIONS: CPT

## 2024-10-08 PROCEDURE — 97110 THERAPEUTIC EXERCISES: CPT
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
## 2024-10-15 ENCOUNTER — OFFICE VISIT (OUTPATIENT)
Dept: PHYSICAL THERAPY | Age: 34
End: 2024-10-15
Payer: COMMERCIAL

## 2024-10-15 DIAGNOSIS — M54.16 LUMBAR RADICULOPATHY: Primary | ICD-10-CM

## 2024-10-15 PROCEDURE — 97110 THERAPEUTIC EXERCISES: CPT | Performed by: PHYSICAL THERAPIST

## 2024-10-15 PROCEDURE — 97164 PT RE-EVAL EST PLAN CARE: CPT | Performed by: PHYSICAL THERAPIST
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
## 2024-10-22 ENCOUNTER — APPOINTMENT (OUTPATIENT)
Dept: PHYSICAL THERAPY | Age: 34
End: 2024-10-22
Payer: COMMERCIAL

## 2024-10-29 ENCOUNTER — APPOINTMENT (OUTPATIENT)
Dept: PHYSICAL THERAPY | Age: 34
End: 2024-10-29
Payer: COMMERCIAL

## 2024-10-30 ENCOUNTER — HOSPITAL ENCOUNTER (OUTPATIENT)
Dept: RADIOLOGY | Facility: HOSPITAL | Age: 34
Discharge: HOME | End: 2024-10-30
Payer: COMMERCIAL

## 2024-10-30 DIAGNOSIS — M25.552 PAIN IN JOINT INVOLVING LEFT PELVIC REGION AND THIGH: ICD-10-CM

## 2024-10-30 PROCEDURE — 73502 X-RAY EXAM HIP UNI 2-3 VIEWS: CPT

## 2024-11-08 ENCOUNTER — TELEPHONE (OUTPATIENT)
Dept: PAIN MEDICINE | Facility: CLINIC | Age: 34
End: 2024-11-08

## 2024-11-08 ENCOUNTER — OFFICE VISIT (OUTPATIENT)
Dept: PAIN MEDICINE | Facility: CLINIC | Age: 34
End: 2024-11-08
Payer: COMMERCIAL

## 2024-11-08 VITALS
HEART RATE: 91 BPM | HEIGHT: 58 IN | BODY MASS INDEX: 39.67 KG/M2 | DIASTOLIC BLOOD PRESSURE: 81 MMHG | WEIGHT: 189 LBS | SYSTOLIC BLOOD PRESSURE: 122 MMHG

## 2024-11-08 DIAGNOSIS — M48.061 SPINAL STENOSIS OF LUMBAR REGION, UNSPECIFIED WHETHER NEUROGENIC CLAUDICATION PRESENT: ICD-10-CM

## 2024-11-08 DIAGNOSIS — M54.16 LUMBAR RADICULOPATHY: Primary | ICD-10-CM

## 2024-11-08 DIAGNOSIS — M54.2 NECK PAIN: ICD-10-CM

## 2024-11-08 PROCEDURE — 99204 OFFICE O/P NEW MOD 45 MIN: CPT | Performed by: ANESTHESIOLOGY

## 2024-11-08 RX ORDER — CYCLOBENZAPRINE HCL 5 MG
TABLET ORAL
COMMUNITY
Start: 2024-10-02

## 2024-11-08 RX ORDER — BUSPIRONE HYDROCHLORIDE 5 MG/1
TABLET ORAL
COMMUNITY
Start: 2024-10-30

## 2024-11-08 RX ORDER — HYDROCHLOROTHIAZIDE 25 MG/1
TABLET ORAL
COMMUNITY
Start: 2024-11-05

## 2024-11-08 NOTE — TELEPHONE ENCOUNTER
I asked her if she was taking the baclofen and she said no, she was taking cyclobenzaprine.  The patient should not be taking 2 muscle relaxants at the same time.  She can continue with cyclobenzaprine as ordered

## 2024-11-08 NOTE — TELEPHONE ENCOUNTER
Patient called the RX Refill Line. Message is being forwarded to the office.     Patient is requesting   baclofen 10 mg tablet Patient had an appointment today with Dr Rudy Flores, he told her to restart baclofen, but she accidentally removed it from her Medication list. Patient stated she needs a refill, and that she is completely out of this medication.      Please contact patient at [phone]
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
## 2024-11-11 ENCOUNTER — EVALUATION (OUTPATIENT)
Dept: PHYSICAL THERAPY | Facility: REHABILITATION | Age: 34
End: 2024-11-11
Payer: COMMERCIAL

## 2024-11-11 DIAGNOSIS — M48.061 SPINAL STENOSIS OF LUMBAR REGION, UNSPECIFIED WHETHER NEUROGENIC CLAUDICATION PRESENT: ICD-10-CM

## 2024-11-11 DIAGNOSIS — M54.16 LUMBAR RADICULOPATHY: ICD-10-CM

## 2024-11-11 DIAGNOSIS — M54.2 NECK PAIN: ICD-10-CM

## 2024-11-11 PROCEDURE — 97110 THERAPEUTIC EXERCISES: CPT | Performed by: PHYSICAL THERAPIST

## 2024-11-11 PROCEDURE — 97162 PT EVAL MOD COMPLEX 30 MIN: CPT | Performed by: PHYSICAL THERAPIST
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
## 2024-11-14 ENCOUNTER — OFFICE VISIT (OUTPATIENT)
Dept: PHYSICAL THERAPY | Facility: REHABILITATION | Age: 34
End: 2024-11-14
Payer: COMMERCIAL

## 2024-11-14 DIAGNOSIS — M54.16 LUMBAR RADICULOPATHY: Primary | ICD-10-CM

## 2024-11-14 DIAGNOSIS — M48.061 SPINAL STENOSIS OF LUMBAR REGION, UNSPECIFIED WHETHER NEUROGENIC CLAUDICATION PRESENT: ICD-10-CM

## 2024-11-14 DIAGNOSIS — M54.2 NECK PAIN: ICD-10-CM

## 2024-11-14 PROCEDURE — 97750 PHYSICAL PERFORMANCE TEST: CPT | Performed by: PHYSICAL THERAPIST

## 2024-11-14 PROCEDURE — 97110 THERAPEUTIC EXERCISES: CPT | Performed by: PHYSICAL THERAPIST

## 2024-11-14 NOTE — PROGRESS NOTES
Daily Note     Today's date: 2024  Patient name: Gisela Nguyen  : 1990  MRN: [id number]  Referring provider: Rudy Flores DO  Dx:   Encounter Diagnosis     ICD-10-CM    1. Lumbar radiculopathy  M54.16       2. Neck pain  M54.2       3. Spinal stenosis of lumbar region, unspecified whether neurogenic claudication present  M48.061           Start Time: 0805  Stop Time: 0845  Total time in clinic (min): 40 minutes    Subjective: Muscle tightness in her lower back from the exercise. Having frontal headache.       Objective: See treatment diary below  Neurological Testing     Sensation   Cervical/Thoracic   Left   Diminished: light touch    Right   Intact: light touch    Comments   Left light touch: diminished C6.     Reflexes   Left   Biceps (C5/C6): normal (2+)  Brachioradialis (C6): absent (0)    Right   Biceps (C5/C6): normal (2+)  Brachioradialis (C6): absent (0)    Active Range of Motion   Cervical/Thoracic Spine       Cervical    Flexion: 40 degrees   Extension: 30 degrees     with pain  Left lateral flexion: 30 degrees     with pain  Right lateral flexion: 30 degrees      Left rotation: 60 degrees  Right rotation: 65 degrees       Strength/Myotome Testing     Left Shoulder     Planes of Motion   Adduction: 5   External rotation at 0°: 5   Internal rotation at 0°: 4+     Right Shoulder     Planes of Motion   Adduction: 5   External rotation at 0°: 5   Internal rotation at 0°: 5     Left Elbow   Flexion: 5  Extension: 5    Right Elbow   Flexion: 5  Extension: 5    Left Wrist/Hand   Wrist extension: 4  Wrist flexion: 4+  Thumb extension: 5    Right Wrist/Hand   Wrist extension: 4  Wrist flexion: 4  Thumb extension: 4    Objective     Palpation     Additional Palpation Details  SCM R origin TTP, b/l temporalis TTP    Neurological Testing     Sensation     Lumbar   Left   Intact: light touch    Right   Diminished: light touch    Comments   Right light touch: S1 dermatome    Reflexes   Left  "  Patellar (L4): normal (2+)  Achilles (S1): normal (2+)    Right   Patellar (L4): normal (2+)  Achilles (S1): normal (2+)    Active Range of Motion     Lumbar   Flexion:  with pain Restriction level: maximal  Extension:  with pain Restriction level: maximal  Left lateral flexion:  with pain Restriction level: maximal  Right lateral flexion:  with pain Restriction level: maximal    Strength/Myotome Testing     Left Knee   Extension: 5    Right Knee   Extension: 5    Left Ankle/Foot   Dorsiflexion: 5    Right Ankle/Foot   Dorsiflexion: 5      Assessment: Continued cervical and lumbar exam. Patient with increased tenderness to palpation on her R SCM insertion, b/l temporalis and suboccipitals. No clear referral to her frontal headache, possible from SCM and temporalis. Discussed and instructed patient on how posture can cause increased muscle tension in these areas and refer headache pain. Patient taught cervical retractions to add to her \"no money\" exercise to decrease forward head and thoracic kyphosis posture. With her lumbar spine, patient presents with decreased lumbar mobility with c/o \"pulling and pain.\" She has no signs of neurological involvement. Educated patient that her herniated discs in her last MRI are not resulting in any neurological signs or emergency and that graded exercise would be safe to complete. Will begin training this next session. Patient would benefit from continued PT to improve her tolerance with ambulation, household chores, and exercising.     Goals:  Patient will be able to demonstrate minimal lumbar flexion deficit.   Patient will be able to demonstrate minimal lumbar extension deficit.   Patient will be able to ambulate 15 minutes without lumbar pain.  Patient will be able to clean her home with <4/10 lumbar pain.     Plan: Continue per plan of care.        POC expires Unit limit Auth Expiration date PT/OT/ST + Visit Limit?   2/3/25  11/8/25                              Visit/Unit " Tracking  AUTH Status:  Date 11/11 11/14            [employer] Used 1 2             Remaining  23 22              Diagnosis: Cervical pain w/ headaches    Precautions: migraines   Insurance: [employer]    11/11 11/14         Vitals  Lumbar exam *L knee  FOTO      Patient Ed           Posture education  Forward head + thoracic kyphosis posture         PT POC  Educated, gradual exercise                                                                 Neuro Re-Ed                                                                                        Ther Ex           Aerobic conditioning           No Money 50x 10x         Cervical retraction  10x                                                                Ther Activity                                 Manual           Cervical STM  B/l temporalis  Suboccipitals  R SCM origin  8 min                                                     Modalities

## 2024-11-20 ENCOUNTER — OFFICE VISIT (OUTPATIENT)
Dept: PHYSICAL THERAPY | Facility: REHABILITATION | Age: 34
End: 2024-11-20
Payer: COMMERCIAL

## 2024-11-20 DIAGNOSIS — M54.16 LUMBAR RADICULOPATHY: Primary | ICD-10-CM

## 2024-11-20 DIAGNOSIS — M54.2 NECK PAIN: ICD-10-CM

## 2024-11-20 PROCEDURE — 97110 THERAPEUTIC EXERCISES: CPT

## 2024-11-20 PROCEDURE — 97140 MANUAL THERAPY 1/> REGIONS: CPT
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
## 2024-11-21 ENCOUNTER — APPOINTMENT (OUTPATIENT)
Dept: PHYSICAL THERAPY | Facility: REHABILITATION | Age: 34
End: 2024-11-21
Payer: COMMERCIAL

## 2024-11-25 ENCOUNTER — OFFICE VISIT (OUTPATIENT)
Dept: PHYSICAL THERAPY | Facility: REHABILITATION | Age: 34
End: 2024-11-25
Payer: COMMERCIAL

## 2024-11-25 DIAGNOSIS — M48.061 SPINAL STENOSIS OF LUMBAR REGION, UNSPECIFIED WHETHER NEUROGENIC CLAUDICATION PRESENT: ICD-10-CM

## 2024-11-25 DIAGNOSIS — M54.16 LUMBAR RADICULOPATHY: Primary | ICD-10-CM

## 2024-11-25 DIAGNOSIS — M54.2 NECK PAIN: ICD-10-CM

## 2024-11-25 PROCEDURE — 97140 MANUAL THERAPY 1/> REGIONS: CPT | Performed by: PHYSICAL THERAPIST

## 2024-11-25 PROCEDURE — 97110 THERAPEUTIC EXERCISES: CPT | Performed by: PHYSICAL THERAPIST

## 2024-11-25 NOTE — PROGRESS NOTES
Daily Note     Today's date: 2024  Patient name: Gisela Nguyen  : 1990  MRN: [id number]  Referring provider: Rudy Flores DO  Dx:   Encounter Diagnosis     ICD-10-CM    1. Lumbar radiculopathy  M54.16       2. Neck pain  M54.2       3. Spinal stenosis of lumbar region, unspecified whether neurogenic claudication present  M48.061             Start Time: 0805  Stop Time: 0845  Total time in clinic (min): 40 minutes    Subjective: Reports her son's memorial was beautiful. Reports knee hasn't been bothering her much except during the weekend when she was out in the cold and standing a lot. She has felt more tight in her neck, shoulder, and chest region. Not sure if that is related to her period coming soon. She has felt tightness with her stretches still. She also has a headache today on her forehead region.       Objective: See treatment diary below      Assessment: Patient with hypomobility of her cervical and thoracic spine, however it does not reproduce patient's frontal headache. Unclear if it truly has a MSK etiology. Will assess patient's first rib and and scalenes in future session. Instructed on DB which was initially difficult for her as she is mostly a chest breather. Patient able to correct with tactile cues with her hands on her stomach and chest. Patient able to incorporate DB into sit to stands and bridges. Recommend continuing to focus on functional strength training next session.       Plan: Continue per plan of care.     Access Code: [email or web address]  URL: [email or web address]  Date: 2024  Prepared by: Joellen Wilkins    Exercises  - Seated Cervical Retraction  - 3 x daily - 7 x weekly - 10 reps  - Seated Scapular Retraction  - 3 x daily - 7 x weekly - 10 reps  - Supine Lower Trunk Rotation  - 2 x daily - 7 x weekly - 10 reps  - Supine Bridge  - 1 x daily - 7 x weekly - 3 sets - 8 reps  - Squat with Chair Touch  - 1 x daily - 7 x weekly - 3 sets - 10 reps       POC expires  Unit limit Auth Expiration date PT/OT/ST + Visit Limit?   2/3/25  11/8/25                              Visit/Unit Tracking  AUTH Status:  Date 11/11 11/14 11/20 11/25          [employer] Used 1 2 3 4           Remaining  23 22 21 20            Diagnosis: Cervical pain w/ headaches    Precautions: migraines   Insurance: [employer]    11/11 11/14 11/20 11/25       Vitals  Lumbar exam   FOTO *assess scalenes/first rib     Patient Ed           Posture education  Forward head + thoracic kyphosis posture         PT POC  Educated, gradual exercise                                                                 Neuro Re-Ed           DB   Next session 5 min                                                              Ther Ex           Aerobic conditioning   L5 8' Nustep L5  6 min       No Money 50x 10x 20x W/ cervical retraction  10x       Cervical retraction  10x 10x        Thread the needle    2-3 min       LTR   10x 10x       Seated pball roll out    5x each direction        Bridges    W/ DB  3 x 8       Sit to stands    3 x 10 w/ DB                  Ther Activity                                 Manual           Cervical STM  B/l temporalis  Suboccipitals  R SCM origin  8 min B/l temporalis  Suboccipitals  R SCM origin  8 min B/l  Suboccipitals  R SCM origin    Cervical mobilizations in prone  CPA C2-T5  Gr 2  15 min       L/s paraspinals   Prone position cupping 12'                                         Modalities

## 2024-11-27 ENCOUNTER — APPOINTMENT (OUTPATIENT)
Dept: PHYSICAL THERAPY | Facility: REHABILITATION | Age: 34
End: 2024-11-27
Payer: COMMERCIAL

## 2024-12-03 ENCOUNTER — OFFICE VISIT (OUTPATIENT)
Dept: PHYSICAL THERAPY | Facility: REHABILITATION | Age: 34
End: 2024-12-03
Payer: COMMERCIAL

## 2024-12-03 DIAGNOSIS — M54.16 LUMBAR RADICULOPATHY: Primary | ICD-10-CM

## 2024-12-03 DIAGNOSIS — M48.061 SPINAL STENOSIS OF LUMBAR REGION, UNSPECIFIED WHETHER NEUROGENIC CLAUDICATION PRESENT: ICD-10-CM

## 2024-12-03 DIAGNOSIS — M54.2 NECK PAIN: ICD-10-CM

## 2024-12-03 PROCEDURE — 97140 MANUAL THERAPY 1/> REGIONS: CPT

## 2024-12-03 PROCEDURE — 97110 THERAPEUTIC EXERCISES: CPT

## 2024-12-03 NOTE — PROGRESS NOTES
Daily Note     Today's date: 12/3/2024  Patient name: Gisela Nguyen  : 1990  MRN: [id number]  Referring provider: Rudy Flores DO  Dx:   Encounter Diagnosis     ICD-10-CM    1. Lumbar radiculopathy  M54.16       2. Neck pain  M54.2       3. Spinal stenosis of lumbar region, unspecified whether neurogenic claudication present  M48.061                      Subjective: Patient noted headache 7/10 today pre treatment. Patient noted decreased HA to 4/10 post treatment.       Objective: See treatment diary below      Assessment: Tolerated treatment fair. Patient was able to perform listed exercises with good form. SOR helped to reduce patient's headache. Patient Patient would benefit from continued PT      Plan: Continue per plan of care.        POC expires Unit limit Auth Expiration date PT/OT/ST + Visit Limit?   2/3/25  11/8/25                              Visit/Unit Tracking  AUTH Status:  Date 11/11 11/14 11/20 11/25 12/3         [employer] Used 1 2 3 4 5          Remaining  23 22 21 20 21           Diagnosis: Cervical pain w/ headaches    Precautions: migraines   Insurance: [employer]    11/11 11/14 11/20 11/25 12/3      Vitals  Lumbar exam   FOTO NV *assess scalenes/first rib FOTO     Patient Ed           Posture education  Forward head + thoracic kyphosis posture         PT POC  Educated, gradual exercise                                                                 Neuro Re-Ed           DB   Next session 5 min 5 min                                                              Ther Ex           Aerobic conditioning   L5 8' Nustep L5  6 min Nustep 6 min      No Money 50x 10x 20x W/ cervical retraction  10x W/cervical retraction 10x       Cervical retraction  10x 10x        Thread the needle    2-3 min 2 min - 3 min      LTR   10x 10x 10 x       Seated pball roll out    5x each direction        Bridges    W/ DB  3 x 8 W/ DB  3 x 10      Sit to stands    3 x 10 w/ DB 3 x 10 DB                 Ther  Activity                                 Manual           Cervical STM  B/l temporalis  Suboccipitals  R SCM origin  8 min B/l temporalis  Suboccipitals  R SCM origin  8 min B/l  Suboccipitals  R SCM origin    Cervical mobilizations in prone  CPA C2-T5  Gr 2  15 min       L/s paraspinals   Prone position cupping 12'                                         Modalities

## 2024-12-06 ENCOUNTER — OFFICE VISIT (OUTPATIENT)
Dept: PHYSICAL THERAPY | Facility: REHABILITATION | Age: 34
End: 2024-12-06
Payer: COMMERCIAL

## 2024-12-06 DIAGNOSIS — M54.16 LUMBAR RADICULOPATHY: Primary | ICD-10-CM

## 2024-12-06 DIAGNOSIS — M54.2 NECK PAIN: ICD-10-CM

## 2024-12-06 PROCEDURE — 97110 THERAPEUTIC EXERCISES: CPT

## 2024-12-06 PROCEDURE — 97140 MANUAL THERAPY 1/> REGIONS: CPT

## 2024-12-06 NOTE — PROGRESS NOTES
Daily Note     Today's date: 2024  Patient name: Gisela Nguyen  : 1990  MRN: [id number]  Referring provider: Rudy Flores DO  Dx:   Encounter Diagnosis     ICD-10-CM    1. Lumbar radiculopathy  M54.16       2. Neck pain  M54.2             Start Time: 0740          Subjective: Continues to have more neck discomfort and has a HA today with pressure.     Objective: See treatment diary below      Assessment: Tolerated treatment well.  Added more quadriped stretching and strengthening exercises.  Good tolerance and form in addition to shoulder rows.  Patient Patient would benefit from continued PT      Plan: Continue per plan of care.        POC expires Unit limit Auth Expiration date PT/OT/ST + Visit Limit?   2/3/25  11/8/25                              Visit/Unit Tracking  AUTH Status:  Date 11/11 11/14 11/20 11/25 12/3 12/6        [employer] Used 1 2 3 4 5 6         Remaining  23 22 21 20 21 20          Diagnosis: Cervical pain w/ headaches    Precautions: migraines   Insurance: [employer]    11/11 11/14 11/20 11/25 12/3 12/6     Vitals  Lumbar exam   FOTO NV Completed cerv. FOTO *assess scalenes/first rib FOTO       Patient Ed           Posture education  Forward head + thoracic kyphosis posture         PT POC  Educated, gradual exercise                                                                 Neuro Re-Ed           DB   Next session 5 min 5 min                                                              Ther Ex           Aerobic conditioning   L5 8' Nustep L5  6 min Nustep 6 min L5 6'     No Money 50x 10x 20x W/ cervical retraction  10x W/cervical retraction 10x  10x     Cervical retraction  10x 10x        Thread the needle    2-3 min 2 min - 3 min 2'     Cat cow      10x     Prayer stretch      10x     Bird dog      10x     LTR   10x 10x 10 x       Shoulder rows      RCO 2x10     Seated pball roll out    5x each direction        Bridges    W/ DB  3 x 8 W/ DB  3 x 10 2x10     Sit to  stands    3 x 10 w/ DB 3 x 10 DB                 Ther Activity                                 Manual           Cervical STM  B/l temporalis  Suboccipitals  R SCM origin  8 min B/l temporalis  Suboccipitals  R SCM origin  8 min B/l  Suboccipitals  R SCM origin    Cervical mobilizations in prone  CPA C2-T5  Gr 2  15 min  10'     L/s paraspinals   Prone position cupping 12'                                         Modalities

## 2024-12-07 ENCOUNTER — HOSPITAL ENCOUNTER (OUTPATIENT)
Dept: RADIOLOGY | Facility: HOSPITAL | Age: 34
Discharge: HOME | End: 2024-12-07
Attending: ANESTHESIOLOGY
Payer: COMMERCIAL

## 2024-12-07 DIAGNOSIS — M54.16 LUMBAR RADICULOPATHY: ICD-10-CM

## 2024-12-07 PROCEDURE — 72158 MRI LUMBAR SPINE W/O & W/DYE: CPT

## 2024-12-07 PROCEDURE — A9585 GADOBUTROL INJECTION: HCPCS | Performed by: ANESTHESIOLOGY

## 2024-12-07 RX ORDER — GADOBUTROL 604.72 MG/ML
8 INJECTION INTRAVENOUS
Status: COMPLETED | OUTPATIENT
Start: 2024-12-07 | End: 2024-12-07

## 2024-12-07 RX ADMIN — GADOBUTROL 8 ML: 604.72 INJECTION INTRAVENOUS at 05:30

## 2024-12-09 ENCOUNTER — TELEPHONE (OUTPATIENT)
Age: 34
End: 2024-12-09

## 2024-12-09 ENCOUNTER — OFFICE VISIT (OUTPATIENT)
Dept: PHYSICAL THERAPY | Facility: REHABILITATION | Age: 34
End: 2024-12-09
Payer: COMMERCIAL

## 2024-12-09 DIAGNOSIS — M54.2 NECK PAIN: ICD-10-CM

## 2024-12-09 DIAGNOSIS — M48.061 SPINAL STENOSIS OF LUMBAR REGION, UNSPECIFIED WHETHER NEUROGENIC CLAUDICATION PRESENT: ICD-10-CM

## 2024-12-09 DIAGNOSIS — M54.16 LUMBAR RADICULOPATHY: Primary | ICD-10-CM

## 2024-12-09 PROCEDURE — 97110 THERAPEUTIC EXERCISES: CPT | Performed by: PHYSICAL THERAPIST

## 2024-12-09 PROCEDURE — 97140 MANUAL THERAPY 1/> REGIONS: CPT | Performed by: PHYSICAL THERAPIST

## 2024-12-09 NOTE — PROGRESS NOTES
Daily Note     Today's date: 2024  Patient name: Gisela Nguyen  : 1990  MRN: [id number]  Referring provider: Rudy Flores DO  Dx:   Encounter Diagnosis     ICD-10-CM    1. Lumbar radiculopathy  M54.16       2. Neck pain  M54.2       3. Spinal stenosis of lumbar region, unspecified whether neurogenic claudication present  M48.061             Start Time: 0850  Stop Time: 0930  Total time in clinic (min): 40 minutes    Subjective: Reports HA not bad today. Felt discomfort from doing rows last time. Walking on the treadmill every other day for 20 minutes. Also doing her exercises.       Objective: See treatment diary below      Assessment: Assessed patient's 1st rib and scalenes today. Patient with decreased motion of L first rib which also produced discomfort. She responded well to mobilizations and both her ROM and sx improved after mobilization to the 1st rib. Adjusted patient's form with rows which should improve her comfort with this exercise. Patient with continued lumbar discomfort with bridges. Adjusted to focus on lumbar strengthening with bird dogs and romelia chair isometric holds. Will progress towards romelia chair roll downs for eccentric strengthening as tolerated. Patient also to benefit from hip strengthening to support her lumbar spine.       Plan: Continue per plan of care.   Access Code: [email or web address]  URL: [email or web address]  Date: 2024  Prepared by: Joellen Wilkins    Exercises  - Standing Cervical Retraction  - 1 x daily - 7 x weekly - 2 sets - 10 reps  - Cat Cow  - 2 x daily - 7 x weekly - 10 reps  - Standing Row with Resistance  - 1 x daily - 7 x weekly - 3 sets - 10 reps  - Bird Dog  - 1 x daily - 7 x weekly - 3 sets - 10 reps  - Sit to Stand  - 1 x daily - 7 x weekly - 3 sets - 10 reps       POC expires Unit limit Auth Expiration date PT/OT/ST + Visit Limit?   2/3/25  11/8/25                              Visit/Unit Tracking  AUTH Status:  Date   12/3 12/6 12/9       [employer] Used 1 2 3 4 5 6 7        Remaining  23 22 21 20 19 18 17         Diagnosis: Cervical pain w/ headaches    Precautions: migraines   Insurance: [employer]    11/25 12/3 12/6 12/9    Vitals   Completed cerv. FOTO  *hip strengthening  *cervical mobs   Patient Ed        Posture education        PT POC                                                Neuro Re-Ed        DB 5 min 5 min                                               Ther Ex        Aerobic conditioning Nustep L5  6 min Nustep 6 min L5 6'     No Money W/ cervical retraction  10x W/cervical retraction 10x  10x Hold    Cervical retraction        Thread the needle 2-3 min 2 min - 3 min 2'     Cat cow   10x     Prayer stretch   10x     Bird dog   10x 2 x 10a    LTR 10x 10 x       Shoulder rows   RCO 2x10 RCO   3 x 10    Distributed magenta band    Silvestre Chair    8 x 10s hold    Bridges W/ DB  3 x 8 W/ DB  3 x 10 2x10 Hold    Sit to stands 3 x 10 w/ DB 3 x 10 DB              Ther Activity                        Manual        Cervical STM B/l  Suboccipitals  R SCM origin    Cervical mobilizations in prone  CPA C2-T5  Gr 2  15 min  10' 1st rib L mobilization  6 x 6s hold    Scalenes  STM on L    L/s paraspinals                                Modalities

## 2024-12-09 NOTE — PROGRESS NOTES
Daily Note     Today's date: 2024  Patient name: Gisela Nguyen  : 1990  MRN: [id number]  Referring provider: Rudy Flores DO  Dx:   No diagnosis found.                   Subjective: Reports HA not bad today. Felt discomfort from doing rows last time. Walking on the treadmill every other day for 20 minutes. Also doing her exercises.       Objective: See treatment diary below      Assessment: Assessed patient's 1st rib and scalenes today. Patient with decreased motion of L first rib which also produced discomfort. She responded well to mobilizations and both her ROM and sx improved after mobilization to the 1st rib. Adjusted patient's form with rows which should improve her comfort with this exercise. Patient with continued lumbar discomfort with bridges. Adjusted to focus on lumbar strengthening with bird dogs and romelia chair isometric holds. Will progress towards romelia chair roll downs for eccentric strengthening as tolerated. Patient also to benefit from hip strengthening to support her lumbar spine.       Plan: Continue per plan of care.   Access Code: [email or web address]  URL: [email or web address]  Date: 2024  Prepared by: Joellen Wilkins    Exercises  - Standing Cervical Retraction  - 1 x daily - 7 x weekly - 2 sets - 10 reps  - Cat Cow  - 2 x daily - 7 x weekly - 10 reps  - Standing Row with Resistance  - 1 x daily - 7 x weekly - 3 sets - 10 reps  - Bird Dog  - 1 x daily - 7 x weekly - 3 sets - 10 reps  - Sit to Stand  - 1 x daily - 7 x weekly - 3 sets - 10 reps       POC expires Unit limit Auth Expiration date PT/OT/ST + Visit Limit?   2/3/25  11/8/25                              Visit/Unit Tracking  AUTH Status:  Date 11/11 11/14 11/20 11/25 12/3 12/6 12/9       [employer] Used 1 2 3 4 5 6 7        Remaining  23 22 21 20 19 18 17         Diagnosis: Cervical pain w/ headaches    Precautions: migraines   Insurance: [employer]    11/25 12/3 12/6 12/9    Vitals   Completed cerv. FOTO   *hip strengthening  *cervical mobs   Patient Ed        Posture education        PT POC                                                Neuro Re-Ed        DB 5 min 5 min                                               Ther Ex        Aerobic conditioning Nustep L5  6 min Nustep 6 min L5 6'     No Money W/ cervical retraction  10x W/cervical retraction 10x  10x Hold    Cervical retraction        Thread the needle 2-3 min 2 min - 3 min 2'     Cat cow   10x     Prayer stretch   10x     Bird dog   10x 2 x 10a    LTR 10x 10 x       Shoulder rows   RCO 2x10 RCO   3 x 10    Distributed magenta band    Silvestre Chair    8 x 10s hold    Bridges W/ DB  3 x 8 W/ DB  3 x 10 2x10 Hold    Sit to stands 3 x 10 w/ DB 3 x 10 DB              Ther Activity                        Manual        Cervical STM B/l  Suboccipitals  R SCM origin    Cervical mobilizations in prone  CPA C2-T5  Gr 2  15 min  10' 1st rib L mobilization  6 x 6s hold    Scalenes  STM on L    L/s paraspinals                                Modalities

## 2024-12-10 NOTE — TELEPHONE ENCOUNTER
Caller: parish    Doctor: lisa    Reason for call: pt did mri and would like results. It had not been read yet.    Call back#: [phone]  
Caller: parish Higgins    Doctor: Sandra    Reason for call: pt called stating she would like a copy of her MRI notes (not the imaging)  If someone can please print it and call the pt when it's ready and she will copy pick it up    Call back#: [phone]  
Final results are noted. Please advise. thanks  
Please notify the patient the MRI of her lumbar spine demonstrates mild arthritis and disc bulging at L4-5 without any significant central or foraminal stenosis (narrowing where nerves exist).  At her level of previous surgery, L5-S1, there does appear to be epidural scar tissue surrounding the left S1 nerve root.  There is also some left foraminal stenosis at this level as well.  
S/w the patient and reviewed the MRI results. She has an OVS scheduled to review.  
Spoke with pt and advised notes are at   
clear

## 2024-12-12 ENCOUNTER — APPOINTMENT (OUTPATIENT)
Dept: PHYSICAL THERAPY | Facility: REHABILITATION | Age: 34
End: 2024-12-12
Payer: COMMERCIAL

## 2024-12-13 ENCOUNTER — APPOINTMENT (OUTPATIENT)
Dept: PHYSICAL THERAPY | Facility: REHABILITATION | Age: 34
End: 2024-12-13
Payer: COMMERCIAL
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
## 2025-01-03 ENCOUNTER — OFFICE VISIT (OUTPATIENT)
Dept: PAIN MEDICINE | Facility: CLINIC | Age: 35
End: 2025-01-03
Payer: COMMERCIAL

## 2025-01-03 VITALS — BODY MASS INDEX: 39.67 KG/M2 | HEIGHT: 58 IN | WEIGHT: 189 LBS

## 2025-01-03 DIAGNOSIS — M54.16 LUMBAR RADICULOPATHY: Primary | ICD-10-CM

## 2025-01-03 PROCEDURE — 99214 OFFICE O/P EST MOD 30 MIN: CPT | Performed by: NURSE PRACTITIONER

## 2025-01-14 ENCOUNTER — TELEPHONE (OUTPATIENT)
Age: 35
End: 2025-01-14

## 2025-01-14 NOTE — TELEPHONE ENCOUNTER
Caller: Patient    Doctor: Dr Flores    Reason for call: Patient calling to cancel her procedure    Call back#: [phone]

## 2025-01-14 NOTE — TELEPHONE ENCOUNTER
Called patient back she would like to cx for now and think about it more  Does not wish to r/s at this time

## 2025-01-23 ENCOUNTER — EVALUATION (OUTPATIENT)
Dept: PHYSICAL THERAPY | Facility: REHABILITATION | Age: 35
End: 2025-01-23
Payer: COMMERCIAL

## 2025-01-23 DIAGNOSIS — M54.50 CHRONIC BILATERAL LOW BACK PAIN WITHOUT SCIATICA: Primary | ICD-10-CM

## 2025-01-23 DIAGNOSIS — G89.29 CHRONIC BILATERAL LOW BACK PAIN WITHOUT SCIATICA: Primary | ICD-10-CM

## 2025-01-23 PROCEDURE — 97530 THERAPEUTIC ACTIVITIES: CPT | Performed by: PHYSICAL THERAPIST

## 2025-01-23 PROCEDURE — 97164 PT RE-EVAL EST PLAN CARE: CPT | Performed by: PHYSICAL THERAPIST
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
## 2025-01-24 NOTE — PROGRESS NOTES
Daily Note     Today's date: 2025  Patient name: Gisela Nguyen  : 1990  MRN: [id number]  Referring provider: Rudy Flores DO  Dx:   Encounter Diagnosis     ICD-10-CM    1. Chronic bilateral low back pain without sciatica  M54.50     G89.29           Start Time: 0855  Stop Time: 0930  Total time in clinic (min): 35 minutes    Subjective: Tired from helping her  clean the car. Pain in her back and legs. Reports drank pineapple tea and exhalation with BM has helped with her pain with BM.       Objective: See treatment diary below      Assessment: Patient able to be cued for proper hip abductor activation. She was fatigued with this and did not provoke back pain with it. Attempted side steps but patient with difficulty with hip abduction and reported increased back pain. Likely too difficult, regressed to unilateral standing with hip abduction which was tolerated better with proper hip activation and no pain. Patient demonstrated fatigue post treatment, exhibited good technique with therapeutic exercises, and would benefit from continued PT to improve her back pain and tolerance with standing and household activities.       Plan: Continue per plan of care. Access Code: [email or web address]  URL: [email or web address]  Date: 2025  Prepared by: Joellen Wilkins    Exercises  - Squat with Chair Touch  - 1 x daily - 7 x weekly - 3 sets - 10 reps  - Seated Sciatic Tensioner  - 3 x daily - 7 x weekly - 15 reps  - Side Plank on Knees  - 1 x daily - 7 x weekly - 3 sets - 20 hold  - Sidelying Hip Abduction  - 1 x daily - 7 x weekly - 3 sets - 10 reps  - Hip Abduction with Resistance Loop  - 1 x daily - 7 x weekly - 3 sets - 10 reps       POC expires Unit limit Auth Expiration date PT/OT/ST + Visit Limit?   3/20/25  11/8/25                              Visit/Unit Tracking  AUTH Status:  Date 11/11 11/14 11/20 11/25 12/3 12/6 12/9 1/23      [facility] Used 1 2 3 4 5 6 7 8       Remaining  23 22 21 20 19  18 17 16        Diagnosis: Cervical pain w/ headaches, lumbar  left L5-S1 laminotomy and discectomy February 13, 2019.    Precautions: migraines, IBS   Insurance: [employer]    11/25 12/3 12/6 12/9 1/23 1/27   Vitals   Completed cerv. FOTO   HIP ACTIVATION  *sciatic nerve glide   Patient Ed         Posture education         PT POC         Constipation     Exhale with BM, do not strain  Use squatty potty  Communicated with PCP regarding constipation  Increase water, fiber, consider stool softeners    ILU bowel massage    15'                                        Neuro Re-Ed         DB 5 min 5 min        Hip strengthening      Sidelying hip abduction  3 x 10 b/l    Side planks on knees  3 x 20s b/l    Side steps GTB around ankles  2 laps x 14ft  (Hold)    Standing speed skater  [place] TB  2 x 10   Sciatic nerve      Tensioner  15x b/l                              Ther Ex         Aerobic conditioning Nustep L5  6 min Nustep 6 min L5 6'      No Money W/ cervical retraction  10x W/cervical retraction 10x  10x Hold     Cervical retraction         Thread the needle 2-3 min 2 min - 3 min 2'      Cat cow   10x      Prayer stretch   10x      Bird dog   10x 2 x 10a     LTR 10x 10 x        Shoulder rows   RCO 2x10 RCO   3 x 10    Distributed magenta band     Silvestre Chair    8 x 10s hold     Bridges W/ DB  3 x 8 W/ DB  3 x 10 2x10 Hold     Sit to stands 3 x 10 w/ DB 3 x 10 DB                Ther Activity                           Manual         Cervical STM B/l  Suboccipitals  R SCM origin    Cervical mobilizations in prone  CPA C2-T5  Gr 2  15 min  10' 1st rib L mobilization  6 x 6s hold    Scalenes  STM on L     L/s paraspinals                                    Modalities

## 2025-01-27 ENCOUNTER — OFFICE VISIT (OUTPATIENT)
Dept: PHYSICAL THERAPY | Facility: REHABILITATION | Age: 35
End: 2025-01-27
Payer: COMMERCIAL

## 2025-01-27 DIAGNOSIS — M54.50 CHRONIC BILATERAL LOW BACK PAIN WITHOUT SCIATICA: Primary | ICD-10-CM

## 2025-01-27 DIAGNOSIS — G89.29 CHRONIC BILATERAL LOW BACK PAIN WITHOUT SCIATICA: Primary | ICD-10-CM

## 2025-01-27 PROCEDURE — 97112 NEUROMUSCULAR REEDUCATION: CPT | Performed by: PHYSICAL THERAPIST

## 2025-02-04 ENCOUNTER — APPOINTMENT (OUTPATIENT)
Dept: PHYSICAL THERAPY | Facility: REHABILITATION | Age: 35
End: 2025-02-04
Payer: COMMERCIAL

## 2025-02-10 NOTE — PROGRESS NOTES
Daily Note     Today's date: 2025  Patient name: Gisela Nguyen  : 1990  MRN: [id number]  Referring provider: Rudy Flores DO  Dx:   Encounter Diagnosis     ICD-10-CM    1. Chronic bilateral low back pain without sciatica  M54.50     G89.29           Start Time: 1145  Stop Time: 1225  Total time in clinic (min): 40 minutes    Subjective: Has been sick with flu and pneumonia. Has not been able to do her exercises.       Objective: See treatment diary below      Assessment: Patient with more congestion in lying, therefore focused on standing hip strengthening exercises. Patient with improved glute med activation but benefits from cuing for proper positioning to optimize hip strengthening. Able to complete side steps with modifications without back pain. Will f/u in 2 weeks once patient is recovered from pneumonia to continue improving her back pain. Patient demonstrated fatigue post treatment, exhibited good technique with therapeutic exercises, and would benefit from continued PT to improve her back pain and tolerance with standing and household activities.       Plan: Continue per plan of care.   Access Code: [email or web address]  URL: [email or web address]  Date: 2025  Prepared by: Joellen Wilkins    Exercises  - Squat with Chair Touch  - 1 x daily - 7 x weekly - 3 sets - 10 reps  - Seated Sciatic Tensioner  - 3 x daily - 7 x weekly - 15 reps  - Side Plank on Knees  - 1 x daily - 7 x weekly - 3 sets - 20 hold  - Sidelying Hip Abduction  - 1 x daily - 7 x weekly - 3 sets - 10 reps  - Hip Abduction with Resistance Loop  - 1 x daily - 7 x weekly - 3 sets - 10 reps       POC expires Unit limit Auth Expiration date PT/OT/ST + Visit Limit?   3/20/25  11/8/25                              Visit/Unit Tracking  AUTH Status:  Date 11/11 11/14 11/20 11/25 12/3 12/6 12/9 1/23 2/11     [facility] Used 1 2 3 4 5 6 7 8 9      Remaining  23 22 21 20 19 18 17 16 15       Diagnosis: Cervical pain w/ headaches,  lumbar  left L5-S1 laminotomy and discectomy February 13, 2019.    Precautions: migraines, IBS   Insurance: [employer]    12/6 12/9 1/23 1/27 2/11    Vitals Completed cerv. FOTO        Patient Ed         Posture education         PT POC         Constipation   Exhale with BM, do not strain  Use squatty potty  Communicated with PCP regarding constipation  Increase water, fiber, consider stool softeners    ILU bowel massage    15'                                          Neuro Re-Ed         DB         Hip strengthening    Sidelying hip abduction  3 x 10 b/l    Side planks on knees  3 x 20s b/l    Side steps GTB around ankles  2 laps x 14ft  (Hold)    Standing speed skater  [place] TB  2 x 10 Sidelying hip abduction  10x b/l    Standing speed skater  [place] TB  3 x 10    Side steps  [place] TB around ankles  3 laps x 14ft    Sciatic nerve    Tensioner  15x b/l Tensioner   2 x 20 L                               Ther Ex         Aerobic conditioning L5 6'    Nustep Lvl 3  6'    No Money 10x Hold       Cervical retraction         Thread the needle 2'        Cat cow 10x        Prayer stretch 10x        Bird dog 10x 2 x 10a       LTR         Shoulder rows RCO 2x10 RCO   3 x 10    Distributed magenta band       Silvestre Chair  8 x 10s hold       Bridges 2x10 Hold       Sit to stands     3 x 12             Ther Activity                           Manual         Cervical STM 10' 1st rib L mobilization  6 x 6s hold    Scalenes  STM on L       L/s paraspinals                                    Modalities

## 2025-02-11 ENCOUNTER — OFFICE VISIT (OUTPATIENT)
Dept: PHYSICAL THERAPY | Facility: REHABILITATION | Age: 35
End: 2025-02-11
Payer: COMMERCIAL

## 2025-02-11 DIAGNOSIS — G89.29 CHRONIC BILATERAL LOW BACK PAIN WITHOUT SCIATICA: Primary | ICD-10-CM

## 2025-02-11 DIAGNOSIS — M54.50 CHRONIC BILATERAL LOW BACK PAIN WITHOUT SCIATICA: Primary | ICD-10-CM

## 2025-02-11 PROCEDURE — 97110 THERAPEUTIC EXERCISES: CPT | Performed by: PHYSICAL THERAPIST

## 2025-02-24 NOTE — PROGRESS NOTES
Daily Note     Today's date: 2025  Patient name: Gisela Nguyen  : 1990  MRN: [id number]  Referring provider: Rudy Flores DO  Dx:   Encounter Diagnosis     ICD-10-CM    1. Chronic bilateral low back pain without sciatica  M54.50     G89.29             Start Time: 1100  Stop Time: 1145  Total time in clinic (min): 45 minutes    Subjective: Feeling better from the pneumonia. Feeling more muscle soreness in her glutes. She might be losing her insurance, she will let us know.       Objective: See treatment diary below      Assessment: Patient demonstrating improvement in exercise tolerance with less sx. She reports muscle pain/tightness in her hip with hip strengthening exercises which are likely normal with the task at hand. Added 1/2 kneel trunk rotations without pain or issue. Recommend continuing current HEP if insurance lapses. Patient demonstrated fatigue post treatment, exhibited good technique with therapeutic exercises, and would benefit from continued PT to improve her back pain and tolerance with standing and household activities.       Plan: Continue per plan of care.   Access Code: [email or web address]  URL: [email or web address]  Date: 2025  Prepared by: Joellen Wilkins    Exercises  - Squat with Chair Touch  - 1 x daily - 7 x weekly - 3 sets - 10 reps  - Seated Sciatic Tensioner  - 3 x daily - 7 x weekly - 15 reps  - Side Plank on Knees  - 1 x daily - 7 x weekly - 3 sets - 20 hold  - Sidelying Hip Abduction  - 1 x daily - 7 x weekly - 3 sets - 10 reps  - Hip Abduction with Resistance Loop  - 1 x daily - 7 x weekly - 3 sets - 10 reps       POC expires Unit limit Auth Expiration date PT/OT/ST + Visit Limit?   3/20/25  11/8/25                              Visit/Unit Tracking  AUTH Status:  Date 11/11 11/14 11/20 11/25 12/3 12/6 12/9 1/23 2/11 2/25    [facility] Used 1 2 3 4 5 6 7 8 9 10     Remaining  23 22 21 20 19 18 17 16 15 14      Diagnosis: Cervical pain w/ headaches, lumbar  left  L5-S1 laminotomy and discectomy February 13, 2019.    Precautions: migraines, IBS   Insurance: [employer]    12/6 12/9 1/23 1/27 2/11 2/25   Vitals Completed cerv. FOTO        Patient Ed         Posture education         PT POC         Constipation   Exhale with BM, do not strain  Use squatty potty  Communicated with PCP regarding constipation  Increase water, fiber, consider stool softeners    ILU bowel massage    15'                                          Neuro Re-Ed         DB         Hip strengthening    Sidelying hip abduction  3 x 10 b/l    Side planks on knees  3 x 20s b/l    Side steps GTB around ankles  2 laps x 14ft  (Hold)    Standing speed skater  [place] TB  2 x 10 Sidelying hip abduction  10x b/l    Standing speed skater  [place] TB  3 x 10    Side steps  [place] TB around ankles  3 laps x 14ft Standing speed skater  [place] TB  3 x 12 b/l    Side steps  [place] TB around ankles  4 laps x 14ft   Sciatic nerve    Tensioner  15x b/l Tensioner   2 x 20 L    1/2 kneel      Purple CO band  Reverse chop  12x b/l    Red CO band  Reverse chop  3 x 10 b/l                     Ther Ex         Aerobic conditioning L5 6'    Nustep Lvl 3  6' TM 1.5 mph   2%  6 min   No Money 10x Hold       Cervical retraction         Thread the needle 2'        Cat cow 10x        Prayer stretch 10x        Bird dog 10x 2 x 10a    3 x 10 b/l   LTR         Shoulder rows RCO 2x10 RCO   3 x 10    Distributed magenta band       Silvestre Chair  8 x 10s hold       Bridges 2x10 Hold       Sit to stands     3 x 12 10# KB  12x  15#  2 x 10            Ther Activity                           Manual         Cervical STM 10' 1st rib L mobilization  6 x 6s hold    Scalenes  STM on L       L/s paraspinals                                    Modalities

## 2025-02-25 ENCOUNTER — OFFICE VISIT (OUTPATIENT)
Dept: PHYSICAL THERAPY | Facility: REHABILITATION | Age: 35
End: 2025-02-25
Payer: COMMERCIAL

## 2025-02-25 DIAGNOSIS — G89.29 CHRONIC BILATERAL LOW BACK PAIN WITHOUT SCIATICA: Primary | ICD-10-CM

## 2025-02-25 DIAGNOSIS — M54.50 CHRONIC BILATERAL LOW BACK PAIN WITHOUT SCIATICA: Primary | ICD-10-CM

## 2025-02-25 PROCEDURE — 97110 THERAPEUTIC EXERCISES: CPT | Performed by: PHYSICAL THERAPIST

## 2025-03-04 ENCOUNTER — TELEPHONE (OUTPATIENT)
Dept: PHYSICAL THERAPY | Facility: REHABILITATION | Age: 35
End: 2025-03-04

## 2025-03-04 NOTE — TELEPHONE ENCOUNTER
Called patient regarding missing her PT appt today. Patient reports she forgot to call that her health insurance lapsed and she will need to reapply. Will cancel PT appointments at this time.

## (undated) DEVICE — 3M™ TEGADERM™ TRANSPARENT FILM DRESSING FRAME STYLE, 1626W, 4 IN X 4-3/4 IN (10 CM X 12 CM), 50/CT 4CT/CASE: Brand: 3M™ TEGADERM™

## (undated) DEVICE — SPONGE PVP SCRUB WING STERILE

## (undated) DEVICE — TELFA NON-ADHERENT ABSORBENT DRESSING: Brand: TELFA

## (undated) DEVICE — ELECTRODE BLADE MOD E-Z CLEAN 2.5IN 6.4CM -0012M

## (undated) DEVICE — GLOVE SRG BIOGEL ECLIPSE 7

## (undated) DEVICE — ANTIBACTERIAL VIOLET BRAIDED (POLYGLACTIN 910), SYNTHETIC ABSORBABLE SUTURE: Brand: COATED VICRYL

## (undated) DEVICE — SUT MONOCRYL PLUS 3-0 PS-2 27 IN MCP427H

## (undated) DEVICE — INTENDED FOR TISSUE SEPARATION, AND OTHER PROCEDURES THAT REQUIRE A SHARP SURGICAL BLADE TO PUNCTURE OR CUT.: Brand: BARD-PARKER ® CARBON RIB-BACK BLADES

## (undated) DEVICE — GLOVE INDICATOR PI UNDERGLOVE SZ 7.5 BLUE

## (undated) DEVICE — FLOSEAL HEMOSTATIC MATRIX, 5 ML: Brand: FLOSEAL

## (undated) DEVICE — PREP SURGICAL PURPREP 26ML

## (undated) DEVICE — NEEDLE 18 G X 1 1/2 SAFETY

## (undated) DEVICE — SNAP KOVER: Brand: UNBRANDED

## (undated) DEVICE — Device

## (undated) DEVICE — PENCIL ELECTROSURG E-Z CLEAN -0035H

## (undated) DEVICE — BETHLEHEM UNIVERSAL SPINE, KIT: Brand: CARDINAL HEALTH

## (undated) DEVICE — SYRINGE 3ML LL

## (undated) DEVICE — MINOR PROCEDURE DRAPE: Brand: CONVERTORS

## (undated) DEVICE — TOOL 14MH30 LEGEND 14CM 3MM: Brand: MIDAS REX ™

## (undated) DEVICE — INTENDED FOR TISSUE SEPARATION, AND OTHER PROCEDURES THAT REQUIRE A SHARP SURGICAL BLADE TO PUNCTURE OR CUT.: Brand: BARD-PARKER SAFETY BLADES SIZE 10, STERILE